# Patient Record
Sex: FEMALE | Race: WHITE | NOT HISPANIC OR LATINO | Employment: OTHER | URBAN - METROPOLITAN AREA
[De-identification: names, ages, dates, MRNs, and addresses within clinical notes are randomized per-mention and may not be internally consistent; named-entity substitution may affect disease eponyms.]

---

## 2017-01-30 ENCOUNTER — COMMUNICATION - HEALTHEAST (OUTPATIENT)
Dept: ONCOLOGY | Facility: CLINIC | Age: 67
End: 2017-01-30

## 2017-02-12 ENCOUNTER — COMMUNICATION - HEALTHEAST (OUTPATIENT)
Dept: ONCOLOGY | Facility: CLINIC | Age: 67
End: 2017-02-12

## 2017-02-16 ENCOUNTER — OFFICE VISIT - HEALTHEAST (OUTPATIENT)
Dept: ONCOLOGY | Facility: CLINIC | Age: 67
End: 2017-02-16

## 2017-02-16 DIAGNOSIS — C50.411 BREAST CANCER OF UPPER-OUTER QUADRANT OF RIGHT FEMALE BREAST (H): ICD-10-CM

## 2017-02-16 DIAGNOSIS — M81.0 OSTEOPOROSIS: ICD-10-CM

## 2017-02-16 DIAGNOSIS — Z79.811 AROMATASE INHIBITOR USE: ICD-10-CM

## 2017-02-16 DIAGNOSIS — Z12.31 ENCOUNTER FOR SCREENING MAMMOGRAM FOR MALIGNANT NEOPLASM OF BREAST: ICD-10-CM

## 2017-08-15 ENCOUNTER — COMMUNICATION - HEALTHEAST (OUTPATIENT)
Dept: ONCOLOGY | Facility: CLINIC | Age: 67
End: 2017-08-15

## 2017-09-18 ENCOUNTER — HOSPITAL ENCOUNTER (OUTPATIENT)
Dept: MAMMOGRAPHY | Facility: HOSPITAL | Age: 67
Discharge: HOME | End: 2017-09-18
Attending: INTERNAL MEDICINE

## 2017-09-18 DIAGNOSIS — Z12.31 ENCOUNTER FOR SCREENING MAMMOGRAM FOR MALIGNANT NEOPLASM OF BREAST: ICD-10-CM

## 2017-09-18 DIAGNOSIS — C50.411 BREAST CANCER OF UPPER-OUTER QUADRANT OF RIGHT FEMALE BREAST (H): ICD-10-CM

## 2017-09-20 ENCOUNTER — OFFICE VISIT - HEALTHEAST (OUTPATIENT)
Dept: ONCOLOGY | Facility: CLINIC | Age: 67
End: 2017-09-20

## 2017-09-20 DIAGNOSIS — C50.411 BREAST CANCER OF UPPER-OUTER QUADRANT OF RIGHT FEMALE BREAST (H): ICD-10-CM

## 2017-09-20 DIAGNOSIS — Z79.811 AROMATASE INHIBITOR USE: ICD-10-CM

## 2017-09-20 DIAGNOSIS — M81.0 OSTEOPOROSIS: ICD-10-CM

## 2017-09-20 ASSESSMENT — MIFFLIN-ST. JEOR: SCORE: 1641.26

## 2017-09-25 ENCOUNTER — COMMUNICATION - HEALTHEAST (OUTPATIENT)
Dept: ONCOLOGY | Facility: CLINIC | Age: 67
End: 2017-09-25

## 2017-11-21 ENCOUNTER — INFUSION - HEALTHEAST (OUTPATIENT)
Dept: INFUSION THERAPY | Facility: CLINIC | Age: 67
End: 2017-11-21

## 2017-11-21 DIAGNOSIS — M81.0 OSTEOPOROSIS: ICD-10-CM

## 2017-11-21 ASSESSMENT — MIFFLIN-ST. JEOR: SCORE: 1685.49

## 2017-12-13 ENCOUNTER — OFFICE VISIT - HEALTHEAST (OUTPATIENT)
Dept: ONCOLOGY | Facility: CLINIC | Age: 67
End: 2017-12-13

## 2017-12-13 DIAGNOSIS — C50.411 MALIGNANT NEOPLASM OF UPPER-OUTER QUADRANT OF RIGHT BREAST IN FEMALE, ESTROGEN RECEPTOR POSITIVE (H): ICD-10-CM

## 2017-12-13 DIAGNOSIS — Z79.811 AROMATASE INHIBITOR USE: ICD-10-CM

## 2017-12-13 DIAGNOSIS — Z17.0 MALIGNANT NEOPLASM OF UPPER-OUTER QUADRANT OF RIGHT BREAST IN FEMALE, ESTROGEN RECEPTOR POSITIVE (H): ICD-10-CM

## 2017-12-13 DIAGNOSIS — M81.0 AGE-RELATED OSTEOPOROSIS WITHOUT CURRENT PATHOLOGICAL FRACTURE: ICD-10-CM

## 2018-03-21 ENCOUNTER — AMBULATORY - HEALTHEAST (OUTPATIENT)
Dept: INFUSION THERAPY | Facility: CLINIC | Age: 68
End: 2018-03-21

## 2018-03-21 ENCOUNTER — OFFICE VISIT - HEALTHEAST (OUTPATIENT)
Dept: ONCOLOGY | Facility: CLINIC | Age: 68
End: 2018-03-21

## 2018-03-21 DIAGNOSIS — L60.0 INGROWN TOENAIL: ICD-10-CM

## 2018-03-21 DIAGNOSIS — C50.411 MALIGNANT NEOPLASM OF UPPER-OUTER QUADRANT OF RIGHT BREAST IN FEMALE, ESTROGEN RECEPTOR POSITIVE (H): ICD-10-CM

## 2018-03-21 DIAGNOSIS — M81.0 AGE-RELATED OSTEOPOROSIS WITHOUT CURRENT PATHOLOGICAL FRACTURE: ICD-10-CM

## 2018-03-21 DIAGNOSIS — Z17.0 MALIGNANT NEOPLASM OF UPPER-OUTER QUADRANT OF RIGHT BREAST IN FEMALE, ESTROGEN RECEPTOR POSITIVE (H): ICD-10-CM

## 2018-03-21 DIAGNOSIS — E66.09 OBESITY DUE TO EXCESS CALORIES WITHOUT SERIOUS COMORBIDITY: ICD-10-CM

## 2018-03-21 LAB
ALBUMIN SERPL-MCNC: 3.2 G/DL (ref 3.5–5)
ALP SERPL-CCNC: 78 U/L (ref 45–120)
ALT SERPL W P-5'-P-CCNC: 21 U/L (ref 0–45)
ANION GAP SERPL CALCULATED.3IONS-SCNC: 10 MMOL/L (ref 5–18)
AST SERPL W P-5'-P-CCNC: 22 U/L (ref 0–40)
BASOPHILS # BLD AUTO: 0 THOU/UL (ref 0–0.2)
BASOPHILS NFR BLD AUTO: 0 % (ref 0–2)
BILIRUB SERPL-MCNC: 0.3 MG/DL (ref 0–1)
BUN SERPL-MCNC: 17 MG/DL (ref 8–22)
CALCIUM SERPL-MCNC: 9 MG/DL (ref 8.5–10.5)
CHLORIDE BLD-SCNC: 111 MMOL/L (ref 98–107)
CO2 SERPL-SCNC: 20 MMOL/L (ref 22–31)
CREAT SERPL-MCNC: 1.03 MG/DL (ref 0.6–1.1)
EOSINOPHIL # BLD AUTO: 0.1 THOU/UL (ref 0–0.4)
EOSINOPHIL NFR BLD AUTO: 2 % (ref 0–6)
ERYTHROCYTE [DISTWIDTH] IN BLOOD BY AUTOMATED COUNT: 15.3 % (ref 11–14.5)
GFR SERPL CREATININE-BSD FRML MDRD: 53 ML/MIN/1.73M2
GLUCOSE BLD-MCNC: 126 MG/DL (ref 70–125)
HCT VFR BLD AUTO: 38.8 % (ref 35–47)
HGB BLD-MCNC: 12.3 G/DL (ref 12–16)
LYMPHOCYTES # BLD AUTO: 1.8 THOU/UL (ref 0.8–4.4)
LYMPHOCYTES NFR BLD AUTO: 27 % (ref 20–40)
MCH RBC QN AUTO: 28 PG (ref 27–34)
MCHC RBC AUTO-ENTMCNC: 31.7 G/DL (ref 32–36)
MCV RBC AUTO: 88 FL (ref 80–100)
MONOCYTES # BLD AUTO: 0.5 THOU/UL (ref 0–0.9)
MONOCYTES NFR BLD AUTO: 7 % (ref 2–10)
NEUTROPHILS # BLD AUTO: 4.2 THOU/UL (ref 2–7.7)
NEUTROPHILS NFR BLD AUTO: 64 % (ref 50–70)
PLATELET # BLD AUTO: 259 THOU/UL (ref 140–440)
PMV BLD AUTO: 9.7 FL (ref 8.5–12.5)
POTASSIUM BLD-SCNC: 4.2 MMOL/L (ref 3.5–5)
PROT SERPL-MCNC: 6.5 G/DL (ref 6–8)
RBC # BLD AUTO: 4.4 MILL/UL (ref 3.8–5.4)
SODIUM SERPL-SCNC: 141 MMOL/L (ref 136–145)
WBC: 6.6 THOU/UL (ref 4–11)

## 2018-04-16 ENCOUNTER — OFFICE VISIT - HEALTHEAST (OUTPATIENT)
Dept: PODIATRY | Facility: CLINIC | Age: 68
End: 2018-04-16

## 2018-04-16 DIAGNOSIS — L60.0 INGROWN TOENAIL: ICD-10-CM

## 2018-04-16 ASSESSMENT — MIFFLIN-ST. JEOR: SCORE: 1692.29

## 2018-04-25 ENCOUNTER — COMMUNICATION - HEALTHEAST (OUTPATIENT)
Dept: SCHEDULING | Facility: CLINIC | Age: 68
End: 2018-04-25

## 2018-04-26 ENCOUNTER — OFFICE VISIT - HEALTHEAST (OUTPATIENT)
Dept: FAMILY MEDICINE | Facility: CLINIC | Age: 68
End: 2018-04-26

## 2018-04-26 DIAGNOSIS — E66.1: ICD-10-CM

## 2018-04-26 DIAGNOSIS — Z13.29 SCREENING FOR THYROID DISORDER: ICD-10-CM

## 2018-04-26 DIAGNOSIS — R63.5 ABNORMAL WEIGHT GAIN: ICD-10-CM

## 2018-04-26 DIAGNOSIS — Z13.228 SCREENING FOR ENDOCRINE, NUTRITIONAL, METABOLIC AND IMMUNITY DISORDER: ICD-10-CM

## 2018-04-26 DIAGNOSIS — B37.2 CANDIDAL INTERTRIGO: ICD-10-CM

## 2018-04-26 DIAGNOSIS — Z13.29 SCREENING FOR ENDOCRINE, NUTRITIONAL, METABOLIC AND IMMUNITY DISORDER: ICD-10-CM

## 2018-04-26 DIAGNOSIS — Z13.1 SCREENING FOR DIABETES MELLITUS: ICD-10-CM

## 2018-04-26 DIAGNOSIS — G47.39 OTHER SLEEP APNEA: ICD-10-CM

## 2018-04-26 DIAGNOSIS — M19.90 OSTEOARTHRITIS: ICD-10-CM

## 2018-04-26 DIAGNOSIS — Z87.442 HISTORY OF KIDNEY STONES: ICD-10-CM

## 2018-04-26 DIAGNOSIS — Z13.220 SCREENING, LIPID: ICD-10-CM

## 2018-04-26 DIAGNOSIS — Z12.11 SCREEN FOR COLON CANCER: ICD-10-CM

## 2018-04-26 DIAGNOSIS — Z13.0 SCREENING FOR ENDOCRINE, NUTRITIONAL, METABOLIC AND IMMUNITY DISORDER: ICD-10-CM

## 2018-04-26 DIAGNOSIS — E66.813: ICD-10-CM

## 2018-04-26 DIAGNOSIS — F50.819 BINGE EATING DISORDER: ICD-10-CM

## 2018-04-26 DIAGNOSIS — Z13.21 SCREENING FOR ENDOCRINE, NUTRITIONAL, METABOLIC AND IMMUNITY DISORDER: ICD-10-CM

## 2018-04-26 DIAGNOSIS — R73.03 PREDIABETES: ICD-10-CM

## 2018-04-26 DIAGNOSIS — Z71.9 HEALTH EDUCATION: ICD-10-CM

## 2018-04-26 LAB
CHOLEST SERPL-MCNC: 214 MG/DL
FASTING STATUS PATIENT QL REPORTED: YES
HBA1C MFR BLD: 5.9 % (ref 3.5–6)
HDLC SERPL-MCNC: 55 MG/DL
LDLC SERPL CALC-MCNC: 132 MG/DL
TRIGL SERPL-MCNC: 137 MG/DL
TSH SERPL DL<=0.005 MIU/L-ACNC: 2.18 UIU/ML (ref 0.3–5)

## 2018-04-26 ASSESSMENT — MIFFLIN-ST. JEOR: SCORE: 1683.22
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
## 2018-04-26 NOTE — ASSESSMENT & PLAN NOTE
Incidentally found on hemoglobin A1c screening today.  Recommend starting metformin 500 mg orally extended release daily.

## 2018-04-27 LAB
25(OH)D3 SERPL-MCNC: 48.8 NG/ML (ref 30–80)
25(OH)D3 SERPL-MCNC: 48.8 NG/ML (ref 30–80)

## 2018-05-14 ENCOUNTER — COMMUNICATION - HEALTHEAST (OUTPATIENT)
Dept: FAMILY MEDICINE | Facility: CLINIC | Age: 68
End: 2018-05-14

## 2018-05-29 ENCOUNTER — OFFICE VISIT - HEALTHEAST (OUTPATIENT)
Dept: FAMILY MEDICINE | Facility: CLINIC | Age: 68
End: 2018-05-29

## 2018-05-29 DIAGNOSIS — E66.1: ICD-10-CM

## 2018-05-29 DIAGNOSIS — E66.813: ICD-10-CM

## 2018-05-29 ASSESSMENT — MIFFLIN-ST. JEOR: SCORE: 1642.4

## 2018-05-29 NOTE — ASSESSMENT & PLAN NOTE
Dx:BMI 47.7 likely caused by chemotherapeutic agents used for breast cancer treatment. with multiple weight related comorbid conditions including breast cancer, prediabetes, osteoarthritis, sleep apnea, and binge eating disorder.      It is believed that her breast cancer treatment drugs have caused her weight disorder to escalate.  She was referred to me by Dr. Laila Young of oncology hematology for this reason.    She mentioned on 5/29/2018 that the low back pain that she had been experiencing recently resolved with a 9 pound weight loss.  She is no longer having back pain and is able to work in her garden for several hours a day.    Initial Weight: 265 lbs - bmi 47.7 4/26/2018  Weight: 256 lb (116.1 kg) bmi 46.08, 5/29/2018   Weight loss from initial: 9  % Weight loss: 3.4 %    Are you experiencing any side effects to the medications:  No  METFORMIN 500 MG PO Q DAY - prediabetes.  4/26/2018 - 5/29/2018 - LOST 9 LBS THE FIRST MONTH   control:  good  Exercise was discussed: working in her gardens.   Taking supplements:   Fish oil (not taking consistently), vitamin D, multivitamin, 5 hydroxytryptophan, chromium  Discussed journaling food:  Yes. She had two episodes of bingeing in the past 5 weeks.   Patient is pleased with the current results:  yes  The patient is following the nutrition plan:  Did the diet for the first 2-3 weeks but she then fell off a little.   Barriers to losing weight:  Metabolism:   IR      PLAN  Follow up in 1 month.  Continue medications. metformin  Continue supplements.  This month concentrate on   Her goals - eat smaller portions and choose better foods.

## 2018-06-20 ENCOUNTER — OFFICE VISIT - HEALTHEAST (OUTPATIENT)
Dept: ONCOLOGY | Facility: CLINIC | Age: 68
End: 2018-06-20

## 2018-06-20 DIAGNOSIS — C50.411 MALIGNANT NEOPLASM OF UPPER-OUTER QUADRANT OF RIGHT BREAST IN FEMALE, ESTROGEN RECEPTOR POSITIVE (H): ICD-10-CM

## 2018-06-20 DIAGNOSIS — Z17.0 MALIGNANT NEOPLASM OF UPPER-OUTER QUADRANT OF RIGHT BREAST IN FEMALE, ESTROGEN RECEPTOR POSITIVE (H): ICD-10-CM

## 2018-06-20 DIAGNOSIS — Z12.31 ENCOUNTER FOR SCREENING MAMMOGRAM FOR MALIGNANT NEOPLASM OF BREAST: ICD-10-CM

## 2018-06-20 DIAGNOSIS — Z79.810 USE OF TAMOXIFEN (NOLVADEX): ICD-10-CM

## 2018-06-20 DIAGNOSIS — M81.0 AGE-RELATED OSTEOPOROSIS WITHOUT CURRENT PATHOLOGICAL FRACTURE: ICD-10-CM

## 2018-06-20 ASSESSMENT — MIFFLIN-ST. JEOR: SCORE: 1630.6

## 2018-06-26 ENCOUNTER — OFFICE VISIT - HEALTHEAST (OUTPATIENT)
Dept: FAMILY MEDICINE | Facility: CLINIC | Age: 68
End: 2018-06-26

## 2018-06-26 DIAGNOSIS — E66.813: ICD-10-CM

## 2018-06-26 DIAGNOSIS — E66.1: ICD-10-CM

## 2018-06-26 DIAGNOSIS — E78.2 MIXED HYPERLIPIDEMIA: ICD-10-CM

## 2018-06-26 ASSESSMENT — MIFFLIN-ST. JEOR: SCORE: 1633.33

## 2018-06-26 NOTE — ASSESSMENT & PLAN NOTE
lipids - reviewed today  ldl is a little high. Recommend discuss w pcp     Lab Results   Component Value Date    CHOL 214 (H) 04/26/2018     Lab Results   Component Value Date    HDL 55 04/26/2018     Lab Results   Component Value Date    LDLCALC 132 (H) 04/26/2018     Lab Results   Component Value Date    TRIG 137 04/26/2018     No components found for: CHOLHDL

## 2018-06-26 NOTE — ASSESSMENT & PLAN NOTE
Dx:BMI 47.7 likely caused by chemotherapeutic agents used for breast cancer treatment. with multiple weight related comorbid conditions including breast cancer, prediabetes, osteoarthritis, sleep apnea, and binge eating disorder.        Initial Weight: 265 lbs - bmi 47.7 4/26/2018  Weight: 256 lb (116.1 kg) bmi 46.08, 5/29/2018   Weight: 254 lb (115.2 kg)Body mass index is 45.72 kg/(m^2).  6/26/2018   Weight loss from initial: 11  % Weight loss: 4.15 %       Are you experiencing any side effects to the medications:  No  METFORMIN 500 MG PO Q DAY - prediabetes.  4/26/2018 - 6/26/2018  - LOST 11 LBS THE FIRST two months     Hunger control:  good  Exercise was discussed: working in her gardens. She does straw bail gardening.   Taking supplements:   Fish oil (nottaking consistently), vitamin D, multivitamin, 5 hydroxytryptophan, chromium  Discussed journaling food:  Yes. She had two episodes of bingeing in the past 5 weeks.   Patient is pleased with the current results:  yes  The patient is following the nutrition plan:  choosing foods that are not recommended quite frequently.    Barriers to losing weight:  Metabolism:   IR        PLAN  Follow up in 1 month.  Continue medications. metformin  Continue supplements.  Thismonth concentrate on   Her goals - meet with dietician, to help her change food choices.   She mentions if she is sewing or knitting she can be all day without eating because she loves that.

## 2018-07-31 ENCOUNTER — OFFICE VISIT - HEALTHEAST (OUTPATIENT)
Dept: FAMILY MEDICINE | Facility: CLINIC | Age: 68
End: 2018-07-31

## 2018-07-31 DIAGNOSIS — E66.813: ICD-10-CM

## 2018-07-31 DIAGNOSIS — R73.03 PREDIABETES: ICD-10-CM

## 2018-07-31 DIAGNOSIS — Z51.81 ENCOUNTER FOR THERAPEUTIC DRUG MONITORING: ICD-10-CM

## 2018-07-31 DIAGNOSIS — E66.1: ICD-10-CM

## 2018-07-31 ASSESSMENT — MIFFLIN-ST. JEOR: SCORE: 1606.11

## 2018-07-31 NOTE — ASSESSMENT & PLAN NOTE
Dx:BMI 47.7 likely caused by chemotherapeutic agents used for breast cancer treatment. with multiple weight related comorbid conditions including breast cancer, prediabetes, osteoarthritis, sleep apnea, and binge eating disorder.        Initial Weight: 265 lbs - bmi 47.7 4/26/2018  Weight: 256 lb (116.1 kg) bmi 46.08, 5/29/2018   Weight: 254 lb (115.2 kg) Body mass index is 45.72 kg/(m^2).  6/26/2018   Weight: 248 lb (112.5 kg) bmi 44.64, 7/31/2018   Weight loss from initial: 17 down an additional 6 lbs from last month.  % Weight loss: 6.42 %         Are you experiencing any side effects to the medications:  No  METFORMIN 500 MG PO Q DAY - prediabetes.  4/26/2018 - 7/31/2018  - LOST 17 LBS THE FIRST three months      Hunger control:  good  Exercise was discussed: working in her gardens. She does straw bail gardening.   Taking supplements:   Fish oil (not taking consistently), vitamin D, multivitamin, 5 hydroxytryptophan, chromium  Discussed journaling food:  Yes.  Patient is pleased with the current results:  yes  The patient is following the nutrition plan: she is keeping a bag of cut veggies in fridge and this is helping her choose healthy snacks.  Barriers to losing weight:  Metabolism:   IR          PLAN  Follow up in 1 month.  Continue medications. metformin  Continue supplements.  This month concentrate on   Her goals - meet with dietician, to help her change food choices.   She mentions ifshe is sewing or knitting she can be all day without eating because she loves that.     Dx:BMI 47.7 likely caused by chemotherapeutic agents used for breast cancer treatment. with multiple weight related comorbid conditions including breast cancer, prediabetes, osteoarthritis, sleep apnea, and binge eating disorder.        Initial Weight: 265 lbs - bmi 47.7 4/26/2018  Weight: 256 lb (116.1 kg) bmi 46.08, 5/29/2018   Weight: 254 lb (115.2 kg) Body mass index is 45.72 kg/(m^2).  6/26/2018   Weight: 248 lb (112.5 kg) bmi 44.64,  7/31/2018   Weight loss from initial: 17 (down additional 6 lb since 6/2/2018)  % Weight loss: 6.42 %      Are you experiencing any side effects to the medications:  No  METFORMIN 500 MG PO Q DAY - prediabetes.  4/26/2018 - 7/31/2018   - LOST 17 LBS THE FIRST three months      Hunger control:  good  Exercise was discussed: working in her gardens. She does straw bail gardening.   Taking supplements:   Fish oil (not taking consistently), vitamin D, multivitamin, 5 hydroxytryptophan, chromium  Discussed journaling food:  Yes. She had two episodes of bingeing in the past 5 weeks.   Patient is pleased with the current results:  yes  The patient is following the nutrition plan:  choosing foods that are not recommended quite frequently.    Barriers to losing weight:  Metabolism:   IR.  Also gets up 11pm -2 am to exercise because does not want other people to see her at the gym. This will lilkey interfere w healthy sleep pattern         PLAN  Follow up in 1 month.  Continue medications. metformin  Continue supplements.  This month concentrate on  Her goals - meet with dietician, to help her change food choices.  But she has been really busy and so she has not set this up yet.    She mentions if she is sewing or knitting she can be all day without eating because she loves that.  declined to schedule when gi called re colonoscopy -and today tells me that she has been dealing with some kidney stones so right now is not a good time.  Discussed healthy sleep patterns and advised against exercising during normal sleeping hours.

## 2018-09-04 ENCOUNTER — OFFICE VISIT - HEALTHEAST (OUTPATIENT)
Dept: FAMILY MEDICINE | Facility: CLINIC | Age: 68
End: 2018-09-04

## 2018-09-04 DIAGNOSIS — E66.813 CLASS 3 DRUG-INDUCED OBESITY WITH SERIOUS COMORBIDITY AND BODY MASS INDEX (BMI) OF 40.0 TO 44.9 IN ADULT (H): ICD-10-CM

## 2018-09-04 DIAGNOSIS — E66.1 CLASS 3 DRUG-INDUCED OBESITY WITH SERIOUS COMORBIDITY AND BODY MASS INDEX (BMI) OF 40.0 TO 44.9 IN ADULT (H): ICD-10-CM

## 2018-09-04 DIAGNOSIS — R73.03 PREDIABETES: ICD-10-CM

## 2018-09-04 LAB
CREAT SERPL-MCNC: 1.02 MG/DL (ref 0.6–1.1)
GFR SERPL CREATININE-BSD FRML MDRD: 54 ML/MIN/1.73M2
HBA1C MFR BLD: 6.3 % (ref 3.5–6)

## 2018-09-04 ASSESSMENT — MIFFLIN-ST. JEOR: SCORE: 1606.11

## 2018-09-04 NOTE — ASSESSMENT & PLAN NOTE
Dx:BMI 47.7 likely caused by chemotherapeutic agents used for breast cancer treatment. with multiple weight related comorbid conditions including breast cancer, prediabetes, osteoarthritis, sleep apnea, and binge eating disorder.        Initial Weight: 265 lbs - bmi 47.7 4/26/2018  Weight: 256 lb (116.1 kg) bmi 46.08, 5/29/2018   Weight: 254 lb (115.2 kg) Body mass index is 45.72 kg/(m^2).  6/26/2018   Weight: 248 lb (112.5 kg) bmi 44.64, 7/31/2018   Weight: 248 lb (112.5 kg) bmi 44.64, 9/4/2018   Weight loss from initial: 17  % Weight loss: 6.42 %        Are you experiencing any side effects to the medications:  No    Medication notes:   4/30/18 - intake.  4/26/18 IR evidenced by a1c 5.9, fast blood sugar 126 and waist circumfrence 50 in.    METFORMIN 500 MG PO Q DAY - prediabetes.  4/26/2018 - 9/4/2018   - LOST 17 LBS     Vyvanse?   Could consider since intake indicates she binge eats.    NAC - consider if needed for compulsive eating    CONTRAVE   I also thought about Contrave for her.  She was pretty emotional during her intake visit and tearful at times.  If it seems she has some depression ongoing during future visits she may benefit from Wellbutrin or Contrave.        Hunger control:  good  Exercise was discussed: working in her gardens. She does straw bail gardening.   Taking supplements:   Fish oil (not taking consistently), vitamin D, multivitamin, 5 hydroxytryptophan, chromium  Discussed journaling food:  Yes. She had two episodes of bingeing in the past 5 weeks.   Patient is pleased with the current results:  yes  The patient is following the nutrition plan:  choosing foods that are not recommended quite frequently.    Barriers to losing weight:  Metabolism:   IR.  Also gets up 11pm -2 am to exercise because does not want other people to see her at the gym. This will lilkey interfere w healthy sleep pattern         PLAN  MAINTENANCE PHASE.  Discussed CAP plan today.   Less than or = to 250 -  control  251-253 - action   Greater than or equal to 254 - panic - needs immediate follow up    Continue medications. metformin  Continue supplements.Fish oil (not taking consistently), vitamin D, multivitamin, 5 hydroxytryptophan, chromium    Her goals - meet with dietician, to help her change food choices.  But she has been really busy and so she has not set this up yet.      Future discussion topics:   Intermittentfasting.   She mentions if she is sewing or knitting she can be all day without eating because she loves that.  healthy sleep patterns and advised against exercising during normal sleeping hours.  Could consider NAC  In the future.

## 2018-09-20 ENCOUNTER — HOSPITAL ENCOUNTER (OUTPATIENT)
Dept: MAMMOGRAPHY | Facility: CLINIC | Age: 68
Discharge: HOME | End: 2018-09-20
Attending: INTERNAL MEDICINE

## 2018-09-20 DIAGNOSIS — Z17.0 MALIGNANT NEOPLASM OF UPPER-OUTER QUADRANT OF RIGHT BREAST IN FEMALE, ESTROGEN RECEPTOR POSITIVE (H): ICD-10-CM

## 2018-09-20 DIAGNOSIS — C50.411 MALIGNANT NEOPLASM OF UPPER-OUTER QUADRANT OF RIGHT BREAST IN FEMALE, ESTROGEN RECEPTOR POSITIVE (H): ICD-10-CM

## 2018-09-20 DIAGNOSIS — Z12.31 ENCOUNTER FOR SCREENING MAMMOGRAM FOR MALIGNANT NEOPLASM OF BREAST: ICD-10-CM

## 2018-09-24 ENCOUNTER — RECORDS - HEALTHEAST (OUTPATIENT)
Dept: ADMINISTRATIVE | Facility: OTHER | Age: 68
End: 2018-09-24

## 2018-10-03 ENCOUNTER — OFFICE VISIT - HEALTHEAST (OUTPATIENT)
Dept: ONCOLOGY | Facility: CLINIC | Age: 68
End: 2018-10-03

## 2018-10-03 DIAGNOSIS — Z17.0 MALIGNANT NEOPLASM OF UPPER-OUTER QUADRANT OF RIGHT BREAST IN FEMALE, ESTROGEN RECEPTOR POSITIVE (H): ICD-10-CM

## 2018-10-03 DIAGNOSIS — C50.411 MALIGNANT NEOPLASM OF UPPER-OUTER QUADRANT OF RIGHT BREAST IN FEMALE, ESTROGEN RECEPTOR POSITIVE (H): ICD-10-CM

## 2018-10-03 DIAGNOSIS — M85.859 OSTEOPENIA OF NECK OF FEMUR, UNSPECIFIED LATERALITY: ICD-10-CM

## 2018-10-03 DIAGNOSIS — Z79.810 USE OF TAMOXIFEN (NOLVADEX): ICD-10-CM

## 2018-10-03 ASSESSMENT — MIFFLIN-ST. JEOR: SCORE: 1611.1

## 2018-11-01 ENCOUNTER — OFFICE VISIT - HEALTHEAST (OUTPATIENT)
Dept: FAMILY MEDICINE | Facility: CLINIC | Age: 68
End: 2018-11-01

## 2018-11-01 DIAGNOSIS — R73.03 PREDIABETES: ICD-10-CM

## 2018-11-01 DIAGNOSIS — E66.813 CLASS 3 DRUG-INDUCED OBESITY WITH SERIOUS COMORBIDITY AND BODY MASS INDEX (BMI) OF 40.0 TO 44.9 IN ADULT (H): ICD-10-CM

## 2018-11-01 DIAGNOSIS — E66.1 CLASS 3 DRUG-INDUCED OBESITY WITH SERIOUS COMORBIDITY AND BODY MASS INDEX (BMI) OF 40.0 TO 44.9 IN ADULT (H): ICD-10-CM

## 2018-11-01 DIAGNOSIS — M81.0 OSTEOPOROSIS: ICD-10-CM

## 2018-11-01 ASSESSMENT — MIFFLIN-ST. JEOR: SCORE: 1597.04

## 2018-11-01 NOTE — ASSESSMENT & PLAN NOTE
Hemoglobin A1c is worsening despite significant loss.  Metformin was increased today by milligram extended release to 1000 mg at least once daily.  BMI in the problem list.

## 2018-11-01 NOTE — ASSESSMENT & PLAN NOTE
Dx:BMI 47.7 likely caused by chemotherapeutic agents used for breast cancer treatment. with multiple weight related comorbid conditions including breast cancer, prediabetes (worsening despite weight loss as of 11/1/2018), osteoarthritis, sleep apnea, and binge eating disorder.        Initial Weight: 265 lbs - bmi 47.7 4/26/2018  Weight: 256 lb (116.1 kg) bmi 46.08, 5/29/2018   Weight: 254 lb (115.2 kg) Body mass index is 45.72 kg/(m^2).  6/26/2018   Weight: 248 lb (112.5 kg) bmi 44.64, 7/31/2018   Weight: 248 lb (112.5 kg) bmi 44.64, 9/4/2018   Weight: 246 lb (111.6 kg) bmi 44.28, 11/1/2018   Weight loss from initial: 19  % Weight loss: 7.17 %       Are you experiencing any side effects to the medications:  No     Medication notes:   4/30/18 - intake.  4/26/18 IR evidenced by a1c 5.9, fast blood sugar 126 and waist circumfrence 50 in.     METFORMIN 500 MG PO Q DAY - prediabetes.  4/26/2018 - 11/1/2018    - LOST 17 LBS  -  a1c worsening despite weight loss. 4/2018 a1c was 5.9 and weight was 265 lbs, 9/2018 a1c was 6.3 and weight was 248 lbs.   11/1/2018 - metformin increased   METFORMIN XR 1000 MG PO Q DAY - prediabetes (worsening on metformin 500 xr)  11/1/2018 - start. Risks benefits discussed and need to monitor gfr in the setting of gfr in 50s was discussed and informed consent was given.  Alternative of using saxenda also discussed.      SAXENDA - recommended for weight loss and increasing a1c despite weight loss  11/1/2018 - discussed. (would need to discontinue metformin).    Vyvanse?   Could consider since intake indicates she binge eats.     NAC - consider if needed for compulsive eating     CONTRAVE   I also thought about Contrave for her.  She was pretty emotional during her intake visit and tearful at times.  If it seems she has some depression ongoing during future visits she may benefit from Wellbutrin or Contrave.      Hunger control:  good  Exercise was discussed: working on remodeling her house.    Taking supplements:   Fish oil (not taking consistently), vitamin D, multivitamin, 5 hydroxytryptophan, chromium  Discussed journaling food:  Yes. She had two episodes of bingeing in the past 5 weeks.   Patient is pleased with the current results:  yes  The patient is following the nutrition plan:  choosing foods that are not recommended quite frequently.    Barriers to losing weight:  Metabolism:   IR.  erratic sleep pattern       PLAN  ACTIVE WEIGHT LOSS PHASE, HAVING DIFFICULTY/ STARTING TO PLATEAU    Discussed CAP plan today.   Less than or = to 248 - control  249-251 - action   Greater than or equal to 252 - panic - needs immediate follow up     Increase metformin off label for prediabetes (a1c worsening) plan recheck a1c and creatinine in 3 months, but if not better consider dc metformin and start saxenda).     Continue supplements.Fish oil (not taking consistently), vitamin D, multivitamin, 5 hydroxytryptophan, chromium    Goal: Wants to increase physical activity. Wants to be on her treadmill 30 min 5 days per week.     Think about getting to the gym.     dexa was discussed today. Reviewed through [facility] - we discussed that unless doing weight bearing exercise weight loss tends to have a definite negative impact on bone density. She is aware of the importance of this since her mother and aunt also suffered from osteoporosis.    FUTURE ORDERS: DEXA 9/2019. a1c and bmp 12/2018.  Repeat lipid 4/2019.     Return in about 1 month (around 12/1/2018) for weight loss.

## 2018-11-01 NOTE — ASSESSMENT & PLAN NOTE
Chart review today- see BMI in the problem list.  We did discuss the impact of weight loss on bone density and the importance of weightbearing exercise to avoid reduction in bone density.  A bone density scan should be done again in September 2019.

## 2018-12-05 ENCOUNTER — OFFICE VISIT - HEALTHEAST (OUTPATIENT)
Dept: FAMILY MEDICINE | Facility: CLINIC | Age: 68
End: 2018-12-05

## 2018-12-05 DIAGNOSIS — E66.813 CLASS 3 DRUG-INDUCED OBESITY WITH SERIOUS COMORBIDITY AND BODY MASS INDEX (BMI) OF 40.0 TO 44.9 IN ADULT (H): ICD-10-CM

## 2018-12-05 DIAGNOSIS — E66.1 CLASS 3 DRUG-INDUCED OBESITY WITH SERIOUS COMORBIDITY AND BODY MASS INDEX (BMI) OF 40.0 TO 44.9 IN ADULT (H): ICD-10-CM

## 2018-12-05 ASSESSMENT — MIFFLIN-ST. JEOR: SCORE: 1578.89

## 2018-12-05 NOTE — ASSESSMENT & PLAN NOTE
Dx:BMI 47.7 likely caused by chemotherapeutic agents used for breast cancer treatment. with multiple weight related comorbid conditions including breast cancer, prediabetes (worsening despite weight loss as of 11/1/2018), osteoarthritis, sleep apnea, and binge eating disorder.        Initial Weight: 265 lbs - bmi 47.7 4/26/2018  Weight: 256 lb (116.1 kg) bmi 46.08, 5/29/2018   Weight: 254 lb (115.2 kg) Body mass index is 45.72 kg/(m^2).  6/26/2018   Weight: 248 lb (112.5 kg) bmi 44.64, 7/31/2018   Weight: 248 lb (112.5 kg) bmi 44.64, 9/4/2018   Weight: 246 lb (111.6 kg) bmi44.28, 11/1/2018   Weight: (!) 242 lb (109.8 kg) bmi 43.56, 12/5/2018   Weight loss from initial: 23  % Weight loss: 8.68 %          Medication notes:   4/30/18 - intake.  4/26/18 IR evidenced by a1c 5.9, fast blood sugar 126 and waist circumfrence 50 in.     METFORMIN 500 MG PO Q DAY - prediabetes.  4/26/2018 - 11/1/2018    - LOST 17 LBS  -  a1c worsening despite weight loss. 4/2018 a1c was 5.9 and weight was 265 lbs, 9/2018 a1c was 6.3 and weight was 248 lbs.   11/1/2018 - metformin increased   METFORMIN XR 1000 MG PO Q DAY - prediabetes (worsening on metformin 500 xr)  11/1/2018 -12/5/2018  Risks benefits discussed and need to monitor gfr in the setting of gfr in 50s was discussed and informed consent was given.  Alternative of using saxenda also discussed.      SAXENDA - recommended for weight loss and increasing a1c despite weight loss  11/1/2018 - discussed. (would need to discontinue metformin).     Vyvanse?   Could consider since intake indicates she binge eats.     NAC - consider if needed for compulsive eating     CONTRAVE   Benito thought about Contrave for her.  She was pretty emotional during her intake visit and tearful at times.  If it seems she has some depression ongoing during future visits she may benefit fromWellbutrin or Contrave.         PLAN  ACTIVE WEIGHT LOSS PHASE     CAP plan reset today with lower weight.   Less than or  = to 244 - control  244-247 - action   Greater than or equal to 248 - panic - needs immediate follow up     Continue Metformin at increased dose,  off label for prediabetes (a1c worsening), plan recheck a1c and creatinine in 3 months, but if not better consider dc metformin and start saxenda).      Continue supplements.Fish oil (not taking consistently), vitamin D, multivitamin, 5 hydroxytryptophan, chromium     Goal: continue to be on her treadmill 30 min 5 days per week     FUTURE ORDERS: DEXA 9/2019. a1c and bmp 2/2019 (when she has been on the metformin 1000 mg dose x 3 months). Repeat lipid 4/2019.      Return in about 1 month for weight loss.

## 2018-12-14 ENCOUNTER — COMMUNICATION - HEALTHEAST (OUTPATIENT)
Dept: ONCOLOGY | Facility: CLINIC | Age: 68
End: 2018-12-14

## 2019-01-23 ENCOUNTER — OFFICE VISIT - HEALTHEAST (OUTPATIENT)
Dept: FAMILY MEDICINE | Facility: CLINIC | Age: 69
End: 2019-01-23

## 2019-01-23 DIAGNOSIS — R73.03 PREDIABETES: ICD-10-CM

## 2019-01-23 DIAGNOSIS — E66.1 CLASS 3 DRUG-INDUCED OBESITY WITH SERIOUS COMORBIDITY AND BODY MASS INDEX (BMI) OF 40.0 TO 44.9 IN ADULT (H): ICD-10-CM

## 2019-01-23 DIAGNOSIS — Z51.81 ENCOUNTER FOR THERAPEUTIC DRUG MONITORING: ICD-10-CM

## 2019-01-23 DIAGNOSIS — E78.2 MIXED HYPERLIPIDEMIA: ICD-10-CM

## 2019-01-23 DIAGNOSIS — E66.813 CLASS 3 DRUG-INDUCED OBESITY WITH SERIOUS COMORBIDITY AND BODY MASS INDEX (BMI) OF 40.0 TO 44.9 IN ADULT (H): ICD-10-CM

## 2019-01-23 LAB
CHOLEST SERPL-MCNC: 175 MG/DL
CREAT SERPL-MCNC: 0.91 MG/DL (ref 0.6–1.1)
FASTING STATUS PATIENT QL REPORTED: YES
GFR SERPL CREATININE-BSD FRML MDRD: >60 ML/MIN/1.73M2
HBA1C MFR BLD: 6.2 % (ref 3.5–6)
HDLC SERPL-MCNC: 54 MG/DL
LDLC SERPL CALC-MCNC: 94 MG/DL
TRIGL SERPL-MCNC: 133 MG/DL
VIT B12 SERPL-MCNC: 545 PG/ML (ref 213–816)

## 2019-01-23 ASSESSMENT — MIFFLIN-ST. JEOR: SCORE: 1547.14

## 2019-01-23 NOTE — ASSESSMENT & PLAN NOTE
Dx:BMI 47.7 likely caused by chemotherapeutic agents used for breast cancer treatment. with multiple weight related comorbid conditions including breast cancer, prediabetes (worsening despite weight loss as of 11/1/2018), osteoarthritis, sleep apnea, and binge eating disorder.        Initial Weight: 265 lbs - bmi 47.7 4/26/2018  Weight:256 lb (116.1 kg) bmi 46.08, 5/29/2018   Weight: 254 lb (115.2 kg) Body mass index is 45.72 kg/(m^2).  6/26/2018   Weight: 248 lb (112.5 kg) bmi 44.64, 7/31/2018   Weight: 248 lb (112.5 kg) bmi 44.64, 9/4/2018   Weight: 246 lb (111.6 kg) bmi 44.28, 11/1/2018   Weight: (!) 242 lb (109.8 kg) bmi 43.56, 12/5/2018   Weight: (!) 235 lb (106.6 kg)  Weight loss from initial: 30  % Weight loss: 11.32 %       Medication notes:   4/30/18 - intake.  4/26/18 IR evidenced by a1c 5.9, fast blood sugar 126 and waist circumfrence 50 in.     METFORMIN 500 MG PO Q DAY - prediabetes.  4/26/2018 - 11/1/2018    - LOST 17 LBS  -  a1c worsening despite weight loss. 4/2018 a1c was 5.9 and weight was 265 lbs, 9/2018 a1c was 6.3 and weight was 248 lbs.   11/1/2018 - metformin increased   METFORMIN XR 1000 MG PO Q DAY - prediabetes (worsening on metformin 500 xr)  11/1/2018 - 1/23/2019  Risks benefits discussed and need to monitor gfr in the setting of gfr in 50s was discussed and informed consent was given.  Alternative of using saxenda also discussed.      SAXENDA - recommended for weight loss and increasing a1c despite weight loss  11/1/2018 - discussed. (would need to discontinue metformin).     Vyvanse?   Could consider since intake indicates she binge eats.     NAC - consider if needed for compulsive eating     CONTRAVE   I also thought about Contrave for her.  She was pretty emotional during her intake visit and tearful at times.  If it seems she has some depression ongoing during future visits she may benefit from Wellbutrin or Contrave.         PLAN  ACTIVE WEIGHT LOSS PHASE  (BMI down from  47-43)     CAP plan reset today with lower weight.  Less than or = to 237 - control  238-240 - action   Greater than or equal to 241 - panic - needs immediate follow up     Continue Metformin      Continue supplements.Fish oil (not taking consistently), vitamin D, multivitamin, 5 hydroxytryptophan, chromium     Goal: Wants to get on her treadmill 2 times a week.      Labs;   Repeat lipids weight is down 30 lbs since these were last done.   Repeat a1c weight is down 13 lbs since this was last done.   Creatinine and b12 to monitor long term effects of metformin.     FUTURE ORDERS: DEXA 9/2019.      Return in about 1 month for weight loss.

## 2019-01-24 ENCOUNTER — COMMUNICATION - HEALTHEAST (OUTPATIENT)
Dept: FAMILY MEDICINE | Facility: CLINIC | Age: 69
End: 2019-01-24

## 2019-01-24 DIAGNOSIS — R73.03 PREDIABETES: ICD-10-CM

## 2019-02-28 ENCOUNTER — OFFICE VISIT - HEALTHEAST (OUTPATIENT)
Dept: FAMILY MEDICINE | Facility: CLINIC | Age: 69
End: 2019-02-28

## 2019-02-28 DIAGNOSIS — E66.813 CLASS 3 DRUG-INDUCED OBESITY WITH SERIOUS COMORBIDITY AND BODY MASS INDEX (BMI) OF 40.0 TO 44.9 IN ADULT (H): ICD-10-CM

## 2019-02-28 DIAGNOSIS — R73.03 PREDIABETES: ICD-10-CM

## 2019-02-28 DIAGNOSIS — E66.1 CLASS 3 DRUG-INDUCED OBESITY WITH SERIOUS COMORBIDITY AND BODY MASS INDEX (BMI) OF 40.0 TO 44.9 IN ADULT (H): ICD-10-CM

## 2019-02-28 ASSESSMENT — MIFFLIN-ST. JEOR: SCORE: 1553.04

## 2019-02-28 NOTE — ASSESSMENT & PLAN NOTE
Dx:BMI 47.7 likely caused by chemotherapeutic agents used for breast cancer treatment. with multiple weight related comorbid conditions including breast cancer, prediabetes - starting to improve, hyperlipidemia (resolved with weight reduction), osteoarthritis, sleep apnea, and binge eating disorder.        Initial Weight: 265 lbs - bmi 47.7 4/26/2018  Weight: 256 lb (116.1 kg) bmi 46.08, 5/29/2018   Weight: 254 lb (115.2 kg) Body mass index is 45.72 kg/(m^2).  6/26/2018   Weight: 248 lb (112.5 kg) bmi 44.64, 7/31/2018   Weight: 248 lb (112.5 kg) bmi 44.64, 9/4/2018   Weight: 246 lb (111.6 kg) bmi 44.28, 11/1/2018   Weight: (!) 242 lb (109.8 kg) bmi 43.56, 12/5/2018   Weight: (!) 235 lb (106.6 kg), 42.3, 1/23/19  Weight: (!) 236 lb 4.8 oz (107.2 kg) Body mass index is 42.53 kg/m . , 2/28/2019   Weight loss from initial: 28.7  % Weight loss: 10.83 %       Medication notes:   4/30/18 - intake.  4/26/18 IR evidenced by a1c 5.9, fast blood sugar 126 and waist circumfrence 50 in.     METFORMIN 500 MG PO Q DAY - prediabetes.  4/26/2018 - 11/1/2018    - LOST 17 LBS  -  a1c worsening despite weight loss. 4/2018 a1c was 5.9 and weight was 265 lbs, 9/2018 a1c was 6.3 and weight was 248 lbs.   11/1/2018 - metformin increased   METFORMIN XR 1000 MG PO Q DAY - prediabetes (worsening on metformin 500 xr)  11/1/2018 - 2/28/2019   Risks benefits discussed and need to monitor gfr in the setting of gfr in 50s was discussed and informed consent was given.  Alternative of using saxenda also discussed.      SAXENDA - recommended for weight loss and increasing a1c despite weight loss  11/1/2018 - discussed. (would need to discontinue metformin).     Vyvanse?   Could consider since intake indicates she binge eats.     NAC - consider if needed for compulsive eating     CONTRAVE   I also thought about Contrave for her.  She was pretty emotional during her intake visit and tearful at times.  If it seems she has some depression ongoing during  future visits she may benefit from Wellbutrin or Contrave.      pmh that could impact weight loss drug choices  Age: 68 y.o.   Contraception: postmenopausal  Glaucoma history - no  Do you have a history of osteopenia or osteoporosis? - yes.   Seizure history in self or family - yes  Hx of kidneystones - yes  History of pancreatitis - yes  History of bulemia -  no-   Bingeing - 0 times per week  compulsive or emotional eating  - yes  History of thyroid medullary cancer/ other endocrine cancer orfam hx of the same - breast cancer hormone sensitive.   How many oz of caffeinated beverages do you consume per day and what type? Maybe a cup of tea or cup of coffee once or twice a week.   How much alcohol do you drink per day? NONE  Do you have depression - YES  Do you have anxiety  - NO  Are you taking any antidepressants - NO  Do you get migraines - NONE  Do you smoke - FORMER SMOKER  Do you have night eating tendencies - YES  Have you ever had diabetes/ or prediabetes or metabolic syndrome -  YES  Do you have high blood pressure - NO  Do you drink a lot of pop  -NO  Do you have insomnia - NO  Do you have any chronic pain? YES  Do you have high cholesterol - IN THE PAST         PLAN  ACTIVE WEIGHT LOSS PHASE  (BMI down from 47-42) hyperlipidemia resolved with weight reduction and a1c improving with weight reduction.      CAP plan   Less than or = to 238 - control  239-241 - action   Greater than or equal to242 - panic - needs immediate follow up     Continue Metformin   Start wellbutrin 150 xr daily.     Continue supplements.Fish oil (not taking consistently), vitamin D, multivitamin, 5 hydroxytryptophan, chromium     Goal: start wellbutrin.      Labs;  1/2019 normalized lipids noted (w 30 lb loss), normal b12, normal creatinine, impoved a1c from 6.3-6.2 (with 13 lb loss)  Plan repeat labs 5/2019.      FUTURE ORDERS: DEXA 9/2019.      in about 1 month for weight loss.

## 2019-03-14 ENCOUNTER — COMMUNICATION - HEALTHEAST (OUTPATIENT)
Dept: ONCOLOGY | Facility: CLINIC | Age: 69
End: 2019-03-14

## 2019-04-16 ENCOUNTER — OFFICE VISIT - HEALTHEAST (OUTPATIENT)
Dept: FAMILY MEDICINE | Facility: CLINIC | Age: 69
End: 2019-04-16

## 2019-04-16 DIAGNOSIS — E66.813 CLASS 3 DRUG-INDUCED OBESITY WITH SERIOUS COMORBIDITY AND BODY MASS INDEX (BMI) OF 40.0 TO 44.9 IN ADULT (H): ICD-10-CM

## 2019-04-16 DIAGNOSIS — E66.1 CLASS 3 DRUG-INDUCED OBESITY WITH SERIOUS COMORBIDITY AND BODY MASS INDEX (BMI) OF 40.0 TO 44.9 IN ADULT (H): ICD-10-CM

## 2019-04-16 NOTE — ASSESSMENT & PLAN NOTE
Dx:BMI 47.7 likely caused by chemotherapeutic agents used for breast cancer treatment. with multiple weight related comorbid conditions including breast cancer, prediabetes - starting to improve, hyperlipidemia (resolved with weight reduction), osteoarthritis, sleep apnea, and binge eating disorder.        Initial Weight: 265 lbs - bmi 47.7 4/26/2018  Weight: 256 lb (116.1 kg) bmi 46.08, 5/29/2018   Weight: 254 lb (115.2 kg) Body mass index is 45.72 kg/(m^2).  6/26/2018   Weight: 248 lb (112.5 kg) bmi 44.64, 7/31/2018   Weight: 248 lb (112.5 kg) bmi 44.64, 9/4/2018   Weight: 246 lb (111.6 kg) bmi 44.28, 11/1/2018   Weight: (!) 242 lb (109.8 kg) bmi 43.56, 12/5/2018   Weight: (!) 235 lb (106.6 kg), 42.3, 1/23/19  Weight: (!) 236 lb 4.8 oz (107.2 kg) Body mass index is 42.53 kg/m . , 2/28/2019   Weight: (!) 239 lb (108.4 kg), bmi 43.02, 4/16/2019   Weight loss from initial: 26  % Weight loss: 9.81 %        Medication notes:   4/30/18 - intake.  4/26/18 IR evidenced by a1c 5.9, fast blood sugar 126 and waist circumfrence 50 in.     METFORMIN 500 MG PO Q DAY - prediabetes.  4/26/2018 - 11/1/2018    - LOST 17 LBS  -  a1c worsening despite weight loss. 4/2018 a1c was 5.9 and weight was 265 lbs, 9/2018 a1c was 6.3 and weight was 248 lbs.   11/1/2018 - metformin increased   METFORMIN XR 1000 MG PO Q DAY - prediabetes (worsening on metformin 500 xr)  11/1/2018 - 4/16/2019   Risks benefits discussed and need to monitor gfr inthe setting of gfr in 50s was discussed and informed consent was given.  Alternative of using saxenda also discussed.      WELLBUTRIN 150 MG 24 HR TAB DAILY  2/28/19  -never started because she read that it could interact with tamoxifen and she is still taking tamoxifen.     SAXENDA - recommended for weight loss and increasing a1c despite weight loss  11/1/2018 - discussed. (would need to discontinue metformin).     Vyvanse?   Could consider since intake indicates she binge eats.     NAC - consider if  needed for compulsive eating     CONTRAVE   I also thought about Contrave for her.  She was pretty emotional during her intake visit and tearful at times.  If it seems she has some depression ongoing during future visits she may benefit from Wellbutrin or Contrave.        pmh that could impact weight loss drug choices  Age: 68 y.o.   Contraception: postmenopausal  Glaucoma history - no  Do you have a history of osteopenia or osteoporosis? - yes.   Seizure history in self or family - yes  Hx of kidney stones - yes  History of pancreatitis - yes  History of bulemia -  no-   Bingeing - 0 times per week  compulsive or emotional eating  - yes  History of thyroid medullary cancer/ other endocrine cancer or fam hx of the same - breast cancer hormone sensitive.   How many oz of caffeinated beverages do you consume per day and what type? Maybe a cup of tea or cup of coffee once or twice a week.   How much alcohol do you drink per day? NONE  Do you have depression - YES  Do you have anxiety  - NO  Are you taking any antidepressants - NO  Do you get migraines - NONE  Do you smoke - FORMER SMOKER  Do you have night eating tendencies - YES  Have you ever had diabetes/ or prediabetes or metabolic syndrome -  YES  Do you have high blood pressure - NO  Do you drink a lot of pop  - NO  Do you have insomnia - NO  Do you have any chronic pain? YES  Do you have high cholesterol - IN THE PAST         PLAN  WEIGHT MAINTENANCE PHASE/ REGAINING  (BMI down from 47-42 - but now starting to REGAIN) hyperlipidemia resolved with weight reduction and a1c improving with weight reduction. If she is not losing weight / unhappy with weight loss - bariatric surgery would be indicated and that discussion is something I want to bring up when she is ready to discuss it.     CAP plan   Less than or = to 241 - control  242-245 - action   Greater than or equal to 246 - panic - needs immediate follow up     Continue Metformin      Continue supplements.Fish  oil (not taking consistently), vitamin D, multivitamin, 5 hydroxytryptophan, chromium    Barriers to losing weight:  Deals with arthritis pain, that makes it hard to be active. Sometimes binges.      Goal: Start meal planning and being more conscientious about eating.     Labs;  1/2019 normalized lipids noted (w 30 lb loss), normal b12, normal creatinine, impoved a1c from 6.3-6.2 (with 13 lb loss)  Plan repeat labs 5/2019.      FUTURE ORDERS: DEXA 9/2019.      Return in about 1 month for weight loss.

## 2019-04-17 ENCOUNTER — AMBULATORY - HEALTHEAST (OUTPATIENT)
Dept: INFUSION THERAPY | Facility: CLINIC | Age: 69
End: 2019-04-17

## 2019-04-17 ENCOUNTER — OFFICE VISIT - HEALTHEAST (OUTPATIENT)
Dept: ONCOLOGY | Facility: CLINIC | Age: 69
End: 2019-04-17

## 2019-04-17 ENCOUNTER — HOSPITAL ENCOUNTER (OUTPATIENT)
Dept: RADIOLOGY | Facility: CLINIC | Age: 69
Discharge: HOME | End: 2019-04-17
Attending: INTERNAL MEDICINE

## 2019-04-17 DIAGNOSIS — Z17.0 MALIGNANT NEOPLASM OF UPPER-OUTER QUADRANT OF RIGHT BREAST IN FEMALE, ESTROGEN RECEPTOR POSITIVE (H): ICD-10-CM

## 2019-04-17 DIAGNOSIS — M25.552 HIP PAIN, LEFT: ICD-10-CM

## 2019-04-17 DIAGNOSIS — Z79.810 USE OF TAMOXIFEN (NOLVADEX): ICD-10-CM

## 2019-04-17 DIAGNOSIS — C50.411 MALIGNANT NEOPLASM OF UPPER-OUTER QUADRANT OF RIGHT BREAST IN FEMALE, ESTROGEN RECEPTOR POSITIVE (H): ICD-10-CM

## 2019-04-17 DIAGNOSIS — M81.0 AGE-RELATED OSTEOPOROSIS WITHOUT CURRENT PATHOLOGICAL FRACTURE: ICD-10-CM

## 2019-04-17 DIAGNOSIS — Z12.31 ENCOUNTER FOR SCREENING MAMMOGRAM FOR MALIGNANT NEOPLASM OF BREAST: ICD-10-CM

## 2019-04-17 LAB
ALBUMIN SERPL-MCNC: 3.3 G/DL (ref 3.5–5)
ALP SERPL-CCNC: 81 U/L (ref 45–120)
ALT SERPL W P-5'-P-CCNC: 17 U/L (ref 0–45)
AST SERPL W P-5'-P-CCNC: 21 U/L (ref 0–40)
BILIRUB DIRECT SERPL-MCNC: 0.1 MG/DL
BILIRUB SERPL-MCNC: 0.3 MG/DL (ref 0–1)
PROT SERPL-MCNC: 6.6 G/DL (ref 6–8)

## 2019-04-24 ENCOUNTER — COMMUNICATION - HEALTHEAST (OUTPATIENT)
Dept: FAMILY MEDICINE | Facility: CLINIC | Age: 69
End: 2019-04-24

## 2019-04-24 DIAGNOSIS — R73.03 PREDIABETES: ICD-10-CM

## 2019-06-05 ENCOUNTER — COMMUNICATION - HEALTHEAST (OUTPATIENT)
Dept: FAMILY MEDICINE | Facility: CLINIC | Age: 69
End: 2019-06-05

## 2019-06-05 ENCOUNTER — OFFICE VISIT - HEALTHEAST (OUTPATIENT)
Dept: FAMILY MEDICINE | Facility: CLINIC | Age: 69
End: 2019-06-05

## 2019-06-05 DIAGNOSIS — F50.819 BINGE EATING DISORDER: ICD-10-CM

## 2019-06-05 DIAGNOSIS — E66.1 CLASS 3 DRUG-INDUCED OBESITY WITH SERIOUS COMORBIDITY AND BODY MASS INDEX (BMI) OF 40.0 TO 44.9 IN ADULT (H): ICD-10-CM

## 2019-06-05 DIAGNOSIS — M81.0 AGE-RELATED OSTEOPOROSIS WITHOUT CURRENT PATHOLOGICAL FRACTURE: ICD-10-CM

## 2019-06-05 DIAGNOSIS — E66.813 CLASS 3 DRUG-INDUCED OBESITY WITH SERIOUS COMORBIDITY AND BODY MASS INDEX (BMI) OF 40.0 TO 44.9 IN ADULT (H): ICD-10-CM

## 2019-06-05 DIAGNOSIS — R73.03 PREDIABETES: ICD-10-CM

## 2019-06-05 DIAGNOSIS — C50.411 MALIGNANT NEOPLASM OF UPPER-OUTER QUADRANT OF RIGHT BREAST IN FEMALE, ESTROGEN RECEPTOR POSITIVE (H): ICD-10-CM

## 2019-06-05 DIAGNOSIS — Z17.0 MALIGNANT NEOPLASM OF UPPER-OUTER QUADRANT OF RIGHT BREAST IN FEMALE, ESTROGEN RECEPTOR POSITIVE (H): ICD-10-CM

## 2019-06-05 DIAGNOSIS — G47.39 OTHER SLEEP APNEA: ICD-10-CM

## 2019-06-05 ASSESSMENT — MIFFLIN-ST. JEOR: SCORE: 1546.24

## 2019-06-05 NOTE — ASSESSMENT & PLAN NOTE
Dx:BMI 47.7 likely caused by chemotherapeutic agents used for breast cancer treatment. with multiple weight related comorbid conditions including breast cancer, prediabetes - starting to improve, hyperlipidemia (resolved with weight reduction), osteoarthritis, sleep apnea, and binge eating disorder.        Initial Weight: 265 lbs - bmi 47.7 4/26/2018  Weight: 256 lb (116.1 kg) bmi 46.08, 5/29/2018   Weight: 254 lb (115.2 kg) Body mass index is 45.72 kg/(m^2).  6/26/2018   Weight: 248 lb (112.5 kg) bmi 44.64, 7/31/2018   Weight: 248 lb (112.5 kg) bmi 44.64, 9/4/2018   Weight: 246 lb (111.6 kg) bmi 44.28, 11/1/2018   Weight: (!) 242 lb (109.8 kg) bmi 43.56, 12/5/2018   Weight: (!) 235 lb (106.6 kg), 42.3, 1/23/19  Weight: (!) 236 lb 4.8 oz (107.2 kg) Body mass index is 42.53 kg/m . , 2/28/2019   Weight: (!) 239 lb (108.4 kg), bmi 43.02, 4/16/2019   Weight: (!) 234 lb 12.8 oz (106.5 kg) bmi 42.26, 6/5/2019   Weight loss from initial: 30.2  % Weight loss: 11.4 %       Medication notes:   4/30/18 - intake.  4/26/18 IR evidenced by a1c 5.9, fast blood sugar 126 and waist circumfrence 50 in.     METFORMIN 500 MG PO Q DAY - prediabetes.  4/26/2018 - 11/1/2018    - LOST 17 LBS  -  a1c worsening despite weight loss. 4/2018 a1c was 5.9 and weight was 265 lbs, 9/2018 a1c was 6.3 and weight was 248 lbs.   11/1/2018 - metformin increased   11/1/2018 - - 6/5/2019  - she realized she never increased this medication. Although I had thought she was taking 1000 from 11/2018 - 6/5/19  METFORMIN XR 1000 MG PO Q DAY - prediabetes  6/5/2019 - start 1000 mg po q day.     WELLBUTRIN 150 MG 24 HR TAB DAILY  2/28/19  -never started because she read that it could interact with tamoxifen and she is still taking tamoxifen.     SAXENDA - recommended for weight loss and increasing a1c despite weight loss  11/1/2018 - discussed. (would need to discontinue metformin).     Vyvanse?   Could consider since intake indicates she binge eats.     NAC -  consider if needed for compulsive eating     CONTRAVE   I also thought about Contrave for her.  She was pretty emotional during her intake visit and tearful at times.  If it seems she has some depression ongoing during future visits she may benefit from Wellbutrin or Contrave.        pmh that could impact weight loss drug choices  Age: 68 y.o.   Contraception: postmenopausal  Glaucoma history - no  Do you have a history of osteopenia or osteoporosis? - yes.   Seizure history in self or family - yes  Hx of kidney stones - yes  History of pancreatitis - yes  History of bulemia -  no-   Bingeing - 0 times per week  compulsive or emotional eating  - yes  History of thyroid medullary cancer/ other endocrine cancer or fam hx of the same - breast cancer hormone sensitive.   How many oz of caffeinated beveragesdo you consume per day and what type? Maybe a cup of tea or cup of coffee once or twice a week.   How much alcohol do you drink per day? NONE  Do you have depression - YES  Do you have anxiety  - NO  Are you taking any antidepressants - NO  Do you get migraines - NONE  Do you smoke - FORMER SMOKER  Do you have night eating tendencies - YES  Have you ever had diabetes/ or prediabetes or metabolic syndrome -  YES  Do you have high blood pressure - NO  Do you drink a lot of pop  - NO  Do you have insomnia - NO  Do you have any chronic pain? YES  Do you have high cholesterol - IN THE PAST         PLAN  WEIGHT MAINTENANCE PHASE/ REGAINING  (BMI down from 47-42 - but now starting to REGAIN) hyperlipidemia resolved with weight reduction and a1c improving with weight reduction. If she is not losing weight / unhappy with weight loss - bariatric surgery would be indicated and that discussion is something I want to bring up when she is ready to discuss it.     CAP plan   Less than or = to 236- control  237-239 - action   Greater than or equal to 240 - panic - needs immediate follow up     Continue Metformin - she accidentally  was only taking 500 mg per day but I thought that she was taking 1000 since 11/2018.  Due to recheck a1c and titrate med. Would recommend 1000 if over 5.7 or 500 if under 5.7     Continue supplements.Fish oil (not taking consistently), vitamin D, multivitamin, 5 hydroxytryptophan, chromium     Barriers to losing weight:  Deals with arthritis pain, that makes it hard to be active. Sometimes binges.      Goal: try and decrease portion sizes.     Labs;  1/2019 normalized lipids noted (w 30 lb loss), normal b12, normal creatinine, impoved a1c from 6.3-6.2 (with 13 lbloss)  Plan repeat labs 5/2019.      FUTURE ORDERS: DEXA 9/2019.     PREDIABETEShovering 6.3 - 6.2.  Recommend recheck a1c to rule out progression to diabetes. Order placed.    HISTORY BREAST CANCER  She follows up with oncology every 6 months, gets an annual mammogram     OSTEOPOROSIS/OSTEOPENIA  DEXA scan that showed osteoporosis improving and is now osteopenia instead.  DEXA due again 9/2019    SLEEP APNEA  She did have her CPAP adjusted 10/1/2018 but she has lost 12 pounds since then so I have recommended that she visit the sleep specialist again to get her CPAP adjusted again.    BINGE EATING/BOREDOM EATING/COMPULSIVE EATING  She says that she is doing a lot better with this because she distracts herself by working outside in her garden, and when she does that she finds that she does not feel hungry.  She wants to consider the 24-week intensive lifestyle plan in the fall when it may be more difficult for her to get outside and distract herself.     Return in about 1 month for weight loss.

## 2019-06-05 NOTE — ASSESSMENT & PLAN NOTE
She did have her CPAP adjusted 10/1/2018 but she has lost 12 pounds since then so I have recommended that she visit the sleep specialist again to get her CPAP adjusted again.

## 2019-06-05 NOTE — ASSESSMENT & PLAN NOTE
BINGE EATING/BOREDOM EATING/COMPULSIVE EATING  She says that she is doing a lot better with this because she distracts herself by working outside in her garden, and when she does that she finds that she does not feel hungry.

## 2019-07-09 ENCOUNTER — HOSPITAL ENCOUNTER (OUTPATIENT)
Dept: LAB | Age: 69
Setting detail: SPECIMEN
Discharge: HOME | End: 2019-07-09

## 2019-07-09 ENCOUNTER — OFFICE VISIT - HEALTHEAST (OUTPATIENT)
Dept: FAMILY MEDICINE | Facility: CLINIC | Age: 69
End: 2019-07-09

## 2019-07-09 DIAGNOSIS — R73.03 PREDIABETES: ICD-10-CM

## 2019-07-09 DIAGNOSIS — Z51.81 ENCOUNTER FOR THERAPEUTIC DRUG MONITORING: ICD-10-CM

## 2019-07-09 DIAGNOSIS — E66.813 CLASS 3 DRUG-INDUCED OBESITY WITH SERIOUS COMORBIDITY AND BODY MASS INDEX (BMI) OF 40.0 TO 44.9 IN ADULT (H): ICD-10-CM

## 2019-07-09 DIAGNOSIS — E66.1 CLASS 3 DRUG-INDUCED OBESITY WITH SERIOUS COMORBIDITY AND BODY MASS INDEX (BMI) OF 40.0 TO 44.9 IN ADULT (H): ICD-10-CM

## 2019-07-09 LAB
ALBUMIN SERPL-MCNC: 3.4 G/DL (ref 3.5–5)
ALP SERPL-CCNC: 67 U/L (ref 45–120)
ALT SERPL W P-5'-P-CCNC: 17 U/L (ref 0–45)
ANION GAP SERPL CALCULATED.3IONS-SCNC: 9 MMOL/L (ref 5–18)
AST SERPL W P-5'-P-CCNC: 20 U/L (ref 0–40)
BILIRUB SERPL-MCNC: 0.4 MG/DL (ref 0–1)
BUN SERPL-MCNC: 18 MG/DL (ref 8–22)
CALCIUM SERPL-MCNC: 9.4 MG/DL (ref 8.5–10.5)
CHLORIDE BLD-SCNC: 110 MMOL/L (ref 98–107)
CO2 SERPL-SCNC: 24 MMOL/L (ref 22–31)
CREAT SERPL-MCNC: 0.91 MG/DL (ref 0.6–1.1)
GFR SERPL CREATININE-BSD FRML MDRD: >60 ML/MIN/1.73M2
GLUCOSE BLD-MCNC: 102 MG/DL (ref 70–125)
HBA1C MFR BLD: 5.9 % (ref 3.5–6)
POTASSIUM BLD-SCNC: 4.8 MMOL/L (ref 3.5–5)
PROT SERPL-MCNC: 6.6 G/DL (ref 6–8)
SODIUM SERPL-SCNC: 143 MMOL/L (ref 136–145)

## 2019-07-09 ASSESSMENT — MIFFLIN-ST. JEOR: SCORE: 1550.77

## 2019-07-09 NOTE — ASSESSMENT & PLAN NOTE
Dx:BMI 47.7 likely caused by chemotherapeutic agents used for breast cancer treatment. with multiple weight related comorbid conditions including breast cancer, prediabetes - starting to improve, hyperlipidemia (resolved with weight reduction), osteoarthritis, sleep apnea, and binge eating disorder.        Initial Weight: 265 lbs - bmi 47.7 4/26/2018  Weight: 256 lb (116.1 kg) bmi 46.08, 5/29/2018   Weight: 254 lb (115.2 kg) Body mass index is 45.72 kg/(m^2).  6/26/2018   Weight: 248 lb (112.5 kg) bmi 44.64, 7/31/2018   Weight: 248 lb (112.5 kg) bmi 44.64, 9/4/2018   Weight: 246 lb (111.6 kg) bmi 44.28, 11/1/2018   Weight: (!) 242 lb (109.8 kg) bmi 43.56, 12/5/2018   Weight: (!) 235 lb (106.6 kg), 42.3, 1/23/19  Weight: (!) 236 lb 4.8 oz (107.2 kg) Body mass index is 42.53 kg/m . , 2/28/2019   Weight: (!) 239 lb (108.4 kg), bmi 43.02, 4/16/2019   Weight: (!) 234 lb 12.8 oz bmi 42.26, 6/5/2019   Weight: (!) 235 lb 12.8 oz (107 kg), bmi 42.44, 7/9/2019   Weight loss from initial: 29.2  % Weight loss: 11.02 %    Medication notes:   4/30/18 - intake.  4/26/18 IR evidenced by a1c 5.9, fast blood sugar 126 and waist circumfrence 50 in.     METFORMIN 500 MG PO Q DAY - prediabetes.  4/26/2018 - 11/1/2018    - LOST 17 LBS  -  a1c worsening despite weight loss. 4/2018 a1c was 5.9 and weight was 265 lbs, 9/2018 a1c was 6.3 and weight was 248 lbs.   11/1/2018 - metformin increased   11/1/2018 - - 6/5/2019  - she realized she never increased this medication. Although I had thought she was taking 1000 from 11/2018 - 6/5/19  METFORMIN XR 1000 MG PO Q DAY - prediabetes  6/5/2019 - 7/9/2019 1000 mg po q day.      WELLBUTRIN 150 MG 24 HR TAB DAILY  2/28/19  -never started because she read that it could interact with tamoxifen and she is still taking tamoxifen.     SAXENDA - recommended for weight loss and increasing a1c despite weight loss  11/1/2018 & 7/9/2019  - discussed. (would need to discontinue metformin).     Vyvanse?   Could  consider since intake indicates she binge eats.     - consider if needed for compulsive eating     CONTRAVE   I also thought about Contrave for her.  She was pretty emotional during her intake visit and tearful at times.  If it seems she has some depression ongoing during future visits she may benefit from Wellbutrin or Contrave.        pmh that could impact weight loss drug choices  Age: 68 y.o.   Contraception: postmenopausal  Glaucoma history - no  Do you have a history of osteopenia or osteoporosis? - yes.   Seizure history in self or family - yes  Hx of kidney stones - yes  History of pancreatitis - yes  History of bulemia -  no-   Bingeing - 0 times per week  compulsive or emotional eating  - yes  History of thyroid medullary cancer/ other endocrine cancer or fam hx of the same - breast cancer hormone sensitive.   How many oz of caffeinated beverages do you consume per day and what type? Maybe a cup of tea or cup of coffee once or twice a week.   How much alcohol do you drink per day? NONE  Do you have depression - YES  Do you have anxiety  - NO  Are you taking any antidepressants - NO  Do you get migraines - NONE  Do you smoke - FORMER SMOKER  Do you have night eating tendencies - YES  Have you ever had diabetes/ or prediabetes or metabolic syndrome - YES  Do you have high blood pressure - NO  Do you drink a lot of pop  - NO  Do you have insomnia - NO  Do you have any chronic pain? YES  Do you have high cholesterol - IN THE PAST         PLAN  WEIGHT MAINTENANCE PHASE/ REGAINING  (BMI down from 47-42 - but now starting to REGAIN) hyperlipidemia resolved with weight reduction and a1c improving with weight reduction. If she is not losing weight / unhappy with weight loss - bariatric surgery would be indicated and that discussion is something I want to bring up when she is ready to discuss it.     CAP plan   Less than or = to 237- control  238-240 - action   Greater than or equal to 241 - panic - needs immediate  follow up     Continue Metformin - she accidentally was only taking 500 mg per day but I thought that she was taking 1000 since 11/2018.  Due to recheck a1c and titrate med. Would recommend 1000 if over 5.7 or 500 if under 5.7     Continue supplements.Fish oil (not taking consistently), vitamin D, multivitamin, 5 hydroxytryptophan, chromium     Barriers to losing weight:  Deals with arthritis pain, that makes it hard to be active. Sometimes binges.      Goal: look at saxenda website and call insurance to consider cost of saxenda for possible future use.     Labs;  1/2019 normalized lipids noted (w 30 lb loss), normal b12, normal creatinine, impoved a1c from 6.3-6.2 (with 13 lb loss)  Plan repeat labs 5/2019.      FUTURE ORDERS: DEXA 9/2019.      PREDIABETES  a1c hovering 6.3 - 6.2.  Recommend recheck a1c to rule out progression to diabetes. Ordered again today.     HISTORY BREAST CANCER  She follows up with oncology every 6 months, gets an annual mammogram  -should be seen in the fall 2019 by oncology     OSTEOPOROSIS/OSTEOPENIA  DEXA scan that showed osteoporosis improving and is now osteopenia instead.  DEXA due again 9/2019 -points to get this through her primary care physician who is at [facility]     SLEEP APNEA  She says the CPAP is fitting her fine and it has adjustable straps so she does not feel it needs to be adjusted further.  She is wearing her CPAP machine regularly, and notes that she is been having a lot of trouble with her insurance in regards to payment of the CPAP machine.     BINGE EATING/BOREDOM EATING/COMPULSIVE EATING  She says that she is doing a lot better with this because she distracts herself by working outside in her garden, and when she does that she finds that she does notfeel hungry.  She wants to consider the 24-week intensive lifestyle plan in the fall when it may be more difficult for her to get outside and distract herself.     Return in about 1 month for weight loss.

## 2019-08-26 ENCOUNTER — COMMUNICATION - HEALTHEAST (OUTPATIENT)
Dept: ONCOLOGY | Facility: CLINIC | Age: 69
End: 2019-08-26

## 2019-09-23 ENCOUNTER — HOSPITAL ENCOUNTER (OUTPATIENT)
Dept: MAMMOGRAPHY | Facility: CLINIC | Age: 69
Setting detail: RADIATION/ONCOLOGY SERIES
Discharge: HOME | End: 2019-09-23
Attending: INTERNAL MEDICINE

## 2019-09-23 DIAGNOSIS — Z17.0 MALIGNANT NEOPLASM OF UPPER-OUTER QUADRANT OF RIGHT BREAST IN FEMALE, ESTROGEN RECEPTOR POSITIVE (H): ICD-10-CM

## 2019-09-23 DIAGNOSIS — C50.411 MALIGNANT NEOPLASM OF UPPER-OUTER QUADRANT OF RIGHT BREAST IN FEMALE, ESTROGEN RECEPTOR POSITIVE (H): ICD-10-CM

## 2019-09-23 DIAGNOSIS — Z12.31 ENCOUNTER FOR SCREENING MAMMOGRAM FOR MALIGNANT NEOPLASM OF BREAST: ICD-10-CM

## 2019-09-24 ENCOUNTER — OFFICE VISIT - HEALTHEAST (OUTPATIENT)
Dept: FAMILY MEDICINE | Facility: CLINIC | Age: 69
End: 2019-09-24

## 2019-09-24 DIAGNOSIS — C50.411 MALIGNANT NEOPLASM OF UPPER-OUTER QUADRANT OF RIGHT BREAST IN FEMALE, ESTROGEN RECEPTOR POSITIVE (H): ICD-10-CM

## 2019-09-24 DIAGNOSIS — E66.1 CLASS 3 DRUG-INDUCED OBESITY WITH SERIOUS COMORBIDITY AND BODY MASS INDEX (BMI) OF 40.0 TO 44.9 IN ADULT (H): ICD-10-CM

## 2019-09-24 DIAGNOSIS — G47.39 OTHER SLEEP APNEA: ICD-10-CM

## 2019-09-24 DIAGNOSIS — F50.819 BINGE EATING DISORDER: ICD-10-CM

## 2019-09-24 DIAGNOSIS — Z17.0 MALIGNANT NEOPLASM OF UPPER-OUTER QUADRANT OF RIGHT BREAST IN FEMALE, ESTROGEN RECEPTOR POSITIVE (H): ICD-10-CM

## 2019-09-24 DIAGNOSIS — R73.03 PREDIABETES: ICD-10-CM

## 2019-09-24 DIAGNOSIS — E66.813 CLASS 3 DRUG-INDUCED OBESITY WITH SERIOUS COMORBIDITY AND BODY MASS INDEX (BMI) OF 40.0 TO 44.9 IN ADULT (H): ICD-10-CM

## 2019-09-24 ASSESSMENT — MIFFLIN-ST. JEOR: SCORE: 1567.55

## 2019-09-24 NOTE — ASSESSMENT & PLAN NOTE
She says that she is doing a lot better with this because she distracts herself by working outside in her garden, and when she does that she finds that she does not feel hungry.  She wants to consider the 24-week intensive lifestyle plan in the fall when it may be more difficult for her to get outside and distract herself.

## 2019-09-24 NOTE — ASSESSMENT & PLAN NOTE
NEEDS REPEAT DEXA  DEXA scan that showed osteoporosis improving and is now osteopenia instead.  DEXA due again 9/2019 -points to get this through her primary care physician who is at [facility]    FUTURE ORDERS: DEXA 9/2019. - she says she has this scheduled for next week.

## 2019-09-24 NOTE — ASSESSMENT & PLAN NOTE
IMPROVED  A1c is down from 6.2-5.9 as of 7/9/2019.  Continue metformin 500 mg p.o. daily plan to recheck A1c in October 2019.  Could increase metformin if A1c continues in the prediabetic range.      Continue weight loss efforts, see bmi in the problem list.

## 2019-09-24 NOTE — ASSESSMENT & PLAN NOTE
IMPROVED  She says the CPAP is fitting her fine and it has adjustable straps so she does not feel it needs to be adjusted further.  She is wearing her CPAP machine regularly,and notes that she is been having a lot of trouble with her insurance in regards to payment of the CPAP machine.    Weight reduction recommended.

## 2019-09-24 NOTE — ASSESSMENT & PLAN NOTE
DIFFICULTY MAINTAINING WEIGHT LOSS - CONTINUES TO WORK ON THIS.   Dx:BMI 47.7 likely caused by chemotherapeutic agents used for breast cancer treatment. with multiple weight related comorbid conditions including breast cancer, prediabetes - starting to improve, hyperlipidemia (resolved with weight reduction), osteoarthritis, sleep apnea, and binge eating disorder.        Initial Weight: 265 lbs - bmi 47.7 4/26/2018  Weight: 256 lb (116.1 kg) bmi 46.08, 5/29/2018   Weight: 254 lb (115.2 kg) Body mass index is 45.72 kg/(m^2).  6/26/2018   Weight: 248 lb (112.5 kg) bmi 44.64, 7/31/2018   Weight: 248 lb (112.5 kg) bmi 44.64, 9/4/2018   Weight: 246 lb (111.6 kg) bmi 44.28, 11/1/2018   Weight: (!) 242 lb (109.8 kg) bmi 43.56, 12/5/2018   Weight: (!) 235 lb (106.6 kg), 42.3, 1/23/19  Weight: (!) 236 lb 4.8 oz (107.2 kg) Body mass index is 42.53 kg/m . , 2/28/2019   Weight: (!) 239 lb (108.4 kg), bmi 43.02, 4/16/2019 (!) 234 lb 12.8 oz bmi 42.26, 6/5/2019   Weight: (!) 235 lb 12.8 oz (107 kg), bmi 42.44, 7/9/2019   Weight: (!)239 lb 8 oz , bmi 43.11, 9/24/2019   Weight loss from initial: 25.5  % Weight loss: 9.62 %        Medication notes:   4/30/18 - intake.  4/26/18 IR evidenced by a1c 5.9, fast blood sugar 126 and waist circumfrence 50 in.     METFORMIN 500 MG PO Q DAY - prediabetes.  4/26/2018 - 11/1/2018    - LOST 17 LBS  -  a1c worsening despite weight loss. 4/2018 a1c was 5.9 and weight was 265 lbs, 9/2018 a1c was 6.3 and weight was 248 lbs.   11/1/2018 - metformin increased   11/1/2018 - - 6/5/2019  - she realized she never increased this medication. Although I had thought she was taking 1000 from 11/2018 - 6/5/19  METFORMIN XR 1000 MG PO Q DAY - prediabetes  6/5/2019 - 9/24/2019  1000 mg po q day.      WELLBUTRIN 150 MG 24 HR TAB DAILY  2/28/19  -never started because she read that it could interact with tamoxifen and she is still taking tamoxifen.     SAXENDA - recommended for weight loss and increasing a1c  despite weight loss  11/1/2018 & 7/9/2019  - discussed. (would need to discontinue metformin).     Vyvanse?   Could consider since intake indicates she binge eats.     NAC - consider if needed for compulsive eating     CONTRAVE   I also thought about Contrave for her.  She was pretty emotional during her intake visit and tearful at times.  If it seems she has some depression ongoing during future visits she may benefit from Wellbutrin or Contrave.        pmh that could impact weight loss drug choices  Age: 68 y.o.   Contraception: postmenopausal  Glaucoma history - no  Do you have a history of osteopenia or osteoporosis? - yes.   Seizure history in self or family - yes  Hx of kidney stones - yes  History of pancreatitis - yes  History of bulemia -  no-   Bingeing - 0 times per week  compulsive or emotional eating  yes  History of thyroid medullary cancer/ other endocrine cancer or fam hx of the same - breast cancer hormone sensitive.   How many oz of caffeinated beverages do you consume per day and what type? Maybe a cup of tea or cup of coffee once or twice a week.   How much alcohol do you drink per day? NONE  Do you have depression - YES  Do you have anxiety  - NO  Are you taking any antidepressants - NO  Do you get migraines - NONE  Do you smoke - FORMER SMOKER  Do you have night eating tendencies - YES  Have you ever had diabetes/ or prediabetes or metabolic syndrome -  YES  Do you have high blood pressure - NO  Do you drink a lot of pop  - NO  Do you have insomnia - NO  Do you have any chronic pain? YES  Do you have high cholesterol - IN THE PAST         PLAN  WEIGHT MAINTENANCE PHASE/ REGAINING  (BMI down from 47-42 - but now starting to REGAIN to bmi 43)     hyperlipidemia resolved with weight reduction and a1c improving with weight reduction. If she is not losing weight / unhappy with weight loss - bariatric surgery would be indicated and that discussion is something I want to bring up when she is ready to  discuss it, she is not ready now.     CAP plan   Less than or = to 241- control  243-245 - action   Greater than or equal to 246 - panic - needs immediate follow up     Continue Metformin 500 mg po q day - a1c down from 6.2 to 5.9 (could increase in future)     Continue supplements.Fish oil (not taking consistently), vitamin D, multivitamin, 5 hydroxytryptophan, chromium     Barriers to losing weight:  Deals with arthritis pain, that makes it hard to be active. Sometimes binges. Bread machine. Little exercise. Low motivation.     Goal: look at saxenda website and call insurance to consider cost of saxenda for possible future use.      Return in about 1 month for weight loss.

## 2019-10-17 ENCOUNTER — COMMUNICATION - HEALTHEAST (OUTPATIENT)
Dept: FAMILY MEDICINE | Facility: CLINIC | Age: 69
End: 2019-10-17

## 2019-10-23 ENCOUNTER — OFFICE VISIT - HEALTHEAST (OUTPATIENT)
Dept: FAMILY MEDICINE | Facility: CLINIC | Age: 69
End: 2019-10-23

## 2019-10-23 ENCOUNTER — OFFICE VISIT - HEALTHEAST (OUTPATIENT)
Dept: ONCOLOGY | Facility: CLINIC | Age: 69
End: 2019-10-23

## 2019-10-23 ENCOUNTER — AMBULATORY - HEALTHEAST (OUTPATIENT)
Dept: INFUSION THERAPY | Facility: CLINIC | Age: 69
End: 2019-10-23

## 2019-10-23 DIAGNOSIS — C50.411 MALIGNANT NEOPLASM OF UPPER-OUTER QUADRANT OF RIGHT BREAST IN FEMALE, ESTROGEN RECEPTOR POSITIVE (H): ICD-10-CM

## 2019-10-23 DIAGNOSIS — M81.0 AGE-RELATED OSTEOPOROSIS WITHOUT CURRENT PATHOLOGICAL FRACTURE: ICD-10-CM

## 2019-10-23 DIAGNOSIS — R73.03 PREDIABETES: ICD-10-CM

## 2019-10-23 DIAGNOSIS — E66.1 CLASS 3 DRUG-INDUCED OBESITY WITH SERIOUS COMORBIDITY AND BODY MASS INDEX (BMI) OF 40.0 TO 44.9 IN ADULT (H): ICD-10-CM

## 2019-10-23 DIAGNOSIS — Z17.0 MALIGNANT NEOPLASM OF UPPER-OUTER QUADRANT OF RIGHT BREAST IN FEMALE, ESTROGEN RECEPTOR POSITIVE (H): ICD-10-CM

## 2019-10-23 DIAGNOSIS — Z79.810 USE OF TAMOXIFEN (NOLVADEX): ICD-10-CM

## 2019-10-23 DIAGNOSIS — E78.2 MIXED HYPERLIPIDEMIA: ICD-10-CM

## 2019-10-23 DIAGNOSIS — E66.813 CLASS 3 DRUG-INDUCED OBESITY WITH SERIOUS COMORBIDITY AND BODY MASS INDEX (BMI) OF 40.0 TO 44.9 IN ADULT (H): ICD-10-CM

## 2019-10-23 ASSESSMENT — MIFFLIN-ST. JEOR: SCORE: 1560.75

## 2019-10-23 NOTE — ASSESSMENT & PLAN NOTE
IMPROVED  a1c down from 6.2 to 5.9 between January and July ( weight was stable at 235 throughout that time)- she has actually not lost weight in that timebut has been working on her lifestyle and diet and this is clearly having a positive impact on her health.     Continued weight loss efforts recommended. Plan to recheck a1c at follow up

## 2019-10-23 NOTE — ASSESSMENT & PLAN NOTE
STABLE/ WORSENING    DIFFICULTY MAINTAINING WEIGHT LOSS - CONTINUES TO WORK ON THIS.   47.7 likely caused by chemotherapeutic agents used for breast cancer treatment. with multiple weight related comorbid conditions including breast cancer, prediabetes - starting to improve, hyperlipidemia (resolved with weight reduction), osteoarthritis, sleep apnea, and binge eating disorder.        Initial Weight: 265 lbs - bmi 47.7 4/26/2018  Weight: 256 lb (116.1 kg) bmi 46.08, 5/29/2018   Weight: 254 lb (115.2 kg) Body mass index is 45.72 kg/(m^2).  6/26/2018   Weight: 248 lb (112.5 kg) bmi 44.64, 7/31/2018   Weight: 248 lb (112.5 kg) bmi 44.64, 9/4/2018   Weight: 246 lb (111.6 kg) bmi 44.28, 11/1/2018   Weight: (!) 242 lb (109.8 kg) bmi 43.56, 12/5/2018   Weight: (!) 235 lb (106.6 kg), 42.3, 1/23/19  Weight: (!) 236 lb 4.8 oz (107.2 kg) Body mass index is 42.53 kg/m . , 2/28/2019   Weight: (!) 239 lb (108.4 kg), bmi 43.02, 4/16/2019   Weight: (!) 234 lb 12.8 oz bmi 42.26, 6/5/2019   Weight: (!) 235 lb 12.8 oz (107 kg), bmi 42.44, 7/9/2019   Weight:(!) 239 lb 8 oz , bmi 43.11, 9/24/2019   Weight: (!) 239 lb (108.4 kg), BMI 43, 10/23/2019   Weight loss from initial: 27  % Weight loss: 10.19 %      Medication notes:   4/30/18 - intake.  4/26/18 IR evidenced by a1c 5.9, fast blood sugar 126 and waist circumfrence 50 in.     METFORMIN  - prediabetes.  4/26/2018 -  6/5/19 500 MG POQ DAY  METFORMIN XR 1000 MG PO Q DAY - prediabetes  6/5/2019 - 9/24/2019  1000 mg po q day.      WELLBUTRIN 150 MG 24 HR TAB DAILY  2/28/19  -never started because she read that it could interact with tamoxifen and she is still taking tamoxifen.     SAXENDA - recommended for weight loss and increasing a1c despite weight loss  11/1/2018 & 7/9/2019  - discussed. (would need to discontinue metformin).     Vyvanse?   Could consider since intake indicates she binge eats.     NAC - consider if needed for compulsive eating     CONTRAVE   I also thought about  Contrave for her.  She was pretty emotional during her intake visit and tearful at times.  If it seems she has some depression ongoing during future visits she may benefit from Wellbutrin or Contrave.        pmh that could impact weight loss drug choices  Age: 68 y.o.   Contraception: postmenopausal  Glaucoma history - no  Do you have a history of osteopenia or osteoporosis? - yes.   Seizure history in self or family - yes  Hx of kidney stones - yes  History of pancreatitis - yes  History of bulemia -  no-   Bingeing - 0 times per week  compulsive or emotional eating  - yes  History of thyroid medullary cancer/ other endocrine cancer or fam hx of the same - breast cancer hormone sensitive.   How many oz of caffeinated beverages do you consume per day and what type? Maybe a cup of tea or cup of coffee once or twice a week.   How much alcohol do you drink per day? NONE  Do you have depression - YES  Do you have anxiety  - NO  Are you taking any antidepressants - NO  Doyou get migraines - NONE  Do you smoke - FORMER SMOKER  Do you have night eating tendencies - YES  Have you ever had diabetes/ or prediabetes or metabolic syndrome -  YES  Do you have high blood pressure - NO  Do you drink a lot of pop  - NO  Do you have insomnia - NO  Do you have any chronic pain? YES  Do you have high cholesterol - IN THE PAST         PLAN  WEIGHT MAINTENANCE PHASE/ REGAINING  (BMI down from 47-42 - but now starting to REGAIN to bmi 43)     CAP plan   Less than or = to 241- control  243-245 - action   Greater than or equal to 246 - panic - needs immediate follow up     Continue Metformin 1000 mg po q day- a1c down from 6.2 to 5.9 (could increase in future)     Continue supplements.Fish oil (not taking consistently), vitamin D, multivitamin, 5 hydroxytryptophan, chromium     Patient frustrated with health and inability to exercise due to pain. We discussed weight loss surgery and she is beginning to contemplate this so a video on weight  loss surgery was given and she will take a look     Return in about 1 month for weight loss and to discuss weight loss surgery video if she desires she will also discuss health  with her  (24 wk and 3 pack)

## 2019-10-23 NOTE — ASSESSMENT & PLAN NOTE
WORSENING  Patient very disappointed, she thought her exercise efforts would have helped, but she now realizes that the 10 min of walking she can do before she needs to rest is not enough to build bone. We discussed that weight loss may help her be more active and then she could potentially do more weight bearing exercise to maintain the bone strength that she still has.     Weight loss surgery discussed.

## 2020-01-07 ENCOUNTER — COMMUNICATION - HEALTHEAST (OUTPATIENT)
Dept: SURGERY | Facility: CLINIC | Age: 70
End: 2020-01-07

## 2020-02-20 ENCOUNTER — AMBULATORY - HEALTHEAST (OUTPATIENT)
Dept: ENDOCRINOLOGY | Facility: CLINIC | Age: 70
End: 2020-02-20

## 2020-02-20 DIAGNOSIS — M81.0 OSTEOPOROSIS: ICD-10-CM

## 2020-03-13 ENCOUNTER — COMMUNICATION - HEALTHEAST (OUTPATIENT)
Dept: ENDOCRINOLOGY | Facility: CLINIC | Age: 70
End: 2020-03-13

## 2020-03-13 ENCOUNTER — OFFICE VISIT - HEALTHEAST (OUTPATIENT)
Dept: ENDOCRINOLOGY | Facility: CLINIC | Age: 70
End: 2020-03-13

## 2020-03-13 DIAGNOSIS — M81.8 OTHER OSTEOPOROSIS WITHOUT CURRENT PATHOLOGICAL FRACTURE: ICD-10-CM

## 2020-03-16 ENCOUNTER — COMMUNICATION - HEALTHEAST (OUTPATIENT)
Dept: ENDOCRINOLOGY | Facility: CLINIC | Age: 70
End: 2020-03-16

## 2020-03-19 ENCOUNTER — COMMUNICATION - HEALTHEAST (OUTPATIENT)
Dept: ONCOLOGY | Facility: CLINIC | Age: 70
End: 2020-03-19

## 2020-03-23 ENCOUNTER — COMMUNICATION - HEALTHEAST (OUTPATIENT)
Dept: ONCOLOGY | Facility: CLINIC | Age: 70
End: 2020-03-23

## 2020-03-23 DIAGNOSIS — C50.411 MALIGNANT NEOPLASM OF UPPER-OUTER QUADRANT OF RIGHT BREAST IN FEMALE, ESTROGEN RECEPTOR POSITIVE (H): ICD-10-CM

## 2020-03-23 DIAGNOSIS — Z17.0 MALIGNANT NEOPLASM OF UPPER-OUTER QUADRANT OF RIGHT BREAST IN FEMALE, ESTROGEN RECEPTOR POSITIVE (H): ICD-10-CM

## 2020-03-30 ENCOUNTER — COMMUNICATION - HEALTHEAST (OUTPATIENT)
Dept: ONCOLOGY | Facility: CLINIC | Age: 70
End: 2020-03-30

## 2020-03-31 ENCOUNTER — COMMUNICATION - HEALTHEAST (OUTPATIENT)
Dept: ADMINISTRATIVE | Facility: HOSPITAL | Age: 70
End: 2020-03-31

## 2020-04-22 ENCOUNTER — OFFICE VISIT - HEALTHEAST (OUTPATIENT)
Dept: ONCOLOGY | Facility: CLINIC | Age: 70
End: 2020-04-22

## 2020-04-22 DIAGNOSIS — C50.411 MALIGNANT NEOPLASM OF UPPER-OUTER QUADRANT OF RIGHT BREAST IN FEMALE, ESTROGEN RECEPTOR POSITIVE (H): ICD-10-CM

## 2020-04-22 DIAGNOSIS — Z12.31 ENCOUNTER FOR SCREENING MAMMOGRAM FOR MALIGNANT NEOPLASM OF BREAST: ICD-10-CM

## 2020-04-22 DIAGNOSIS — Z17.0 MALIGNANT NEOPLASM OF UPPER-OUTER QUADRANT OF RIGHT BREAST IN FEMALE, ESTROGEN RECEPTOR POSITIVE (H): ICD-10-CM

## 2020-04-22 DIAGNOSIS — Z79.810 USE OF TAMOXIFEN (NOLVADEX): ICD-10-CM

## 2020-04-22 DIAGNOSIS — M81.0 AGE-RELATED OSTEOPOROSIS WITHOUT CURRENT PATHOLOGICAL FRACTURE: ICD-10-CM

## 2020-10-01 ENCOUNTER — HOSPITAL ENCOUNTER (OUTPATIENT)
Dept: MAMMOGRAPHY | Facility: CLINIC | Age: 70
Setting detail: RADIATION/ONCOLOGY SERIES
Discharge: STILL ADMITTED OR ADMINISTRATIVE | End: 2020-10-01
Attending: INTERNAL MEDICINE

## 2020-10-01 DIAGNOSIS — C50.411 MALIGNANT NEOPLASM OF UPPER-OUTER QUADRANT OF RIGHT BREAST IN FEMALE, ESTROGEN RECEPTOR POSITIVE (H): ICD-10-CM

## 2020-10-01 DIAGNOSIS — Z17.0 MALIGNANT NEOPLASM OF UPPER-OUTER QUADRANT OF RIGHT BREAST IN FEMALE, ESTROGEN RECEPTOR POSITIVE (H): ICD-10-CM

## 2020-10-01 DIAGNOSIS — Z12.31 ENCOUNTER FOR SCREENING MAMMOGRAM FOR MALIGNANT NEOPLASM OF BREAST: ICD-10-CM

## 2020-10-06 ENCOUNTER — OFFICE VISIT - HEALTHEAST (OUTPATIENT)
Dept: ONCOLOGY | Facility: CLINIC | Age: 70
End: 2020-10-06

## 2020-10-06 DIAGNOSIS — C50.411 MALIGNANT NEOPLASM OF UPPER-OUTER QUADRANT OF RIGHT BREAST IN FEMALE, ESTROGEN RECEPTOR POSITIVE (H): ICD-10-CM

## 2020-10-06 DIAGNOSIS — Z17.0 MALIGNANT NEOPLASM OF UPPER-OUTER QUADRANT OF RIGHT BREAST IN FEMALE, ESTROGEN RECEPTOR POSITIVE (H): ICD-10-CM

## 2020-10-06 DIAGNOSIS — M81.0 AGE-RELATED OSTEOPOROSIS WITHOUT CURRENT PATHOLOGICAL FRACTURE: ICD-10-CM

## 2020-10-06 DIAGNOSIS — Z79.810 USE OF TAMOXIFEN (NOLVADEX): ICD-10-CM

## 2020-10-06 DIAGNOSIS — Z23 ENCOUNTER FOR IMMUNIZATION: ICD-10-CM

## 2021-01-20 ENCOUNTER — OFFICE VISIT - HEALTHEAST (OUTPATIENT)
Dept: ONCOLOGY | Facility: CLINIC | Age: 71
End: 2021-01-20

## 2021-01-20 DIAGNOSIS — C50.411 MALIGNANT NEOPLASM OF UPPER-OUTER QUADRANT OF RIGHT BREAST IN FEMALE, ESTROGEN RECEPTOR POSITIVE (H): ICD-10-CM

## 2021-01-20 DIAGNOSIS — Z79.810 USE OF TAMOXIFEN (NOLVADEX): ICD-10-CM

## 2021-01-20 DIAGNOSIS — E66.813 CLASS 3 DRUG-INDUCED OBESITY WITH SERIOUS COMORBIDITY AND BODY MASS INDEX (BMI) OF 40.0 TO 44.9 IN ADULT (H): ICD-10-CM

## 2021-01-20 DIAGNOSIS — Z17.0 MALIGNANT NEOPLASM OF UPPER-OUTER QUADRANT OF RIGHT BREAST IN FEMALE, ESTROGEN RECEPTOR POSITIVE (H): ICD-10-CM

## 2021-01-20 DIAGNOSIS — Z12.31 ENCOUNTER FOR SCREENING MAMMOGRAM FOR MALIGNANT NEOPLASM OF BREAST: ICD-10-CM

## 2021-01-20 DIAGNOSIS — E66.1 CLASS 3 DRUG-INDUCED OBESITY WITH SERIOUS COMORBIDITY AND BODY MASS INDEX (BMI) OF 40.0 TO 44.9 IN ADULT (H): ICD-10-CM

## 2021-01-20 DIAGNOSIS — M81.0 AGE-RELATED OSTEOPOROSIS WITHOUT CURRENT PATHOLOGICAL FRACTURE: ICD-10-CM

## 2021-03-04 ENCOUNTER — COMMUNICATION - HEALTHEAST (OUTPATIENT)
Dept: LAB | Facility: CLINIC | Age: 71
End: 2021-03-04

## 2021-03-04 DIAGNOSIS — M81.0 AGE-RELATED OSTEOPOROSIS WITHOUT CURRENT PATHOLOGICAL FRACTURE: ICD-10-CM

## 2021-05-27 NOTE — PROGRESS NOTES
ASSESSMENT AND PLAN:     Problem List Items Addressed This Visit        High    Class 3 drug-induced obesity with serious comorbidity and body mass index (BMI) of 40.0 to 44.9 in adult (H)     Dx:BMI 47.7 likely caused by chemotherapeutic agents used for breast cancer treatment. with multiple weight related comorbid conditions including breast cancer, prediabetes - starting to improve, hyperlipidemia (resolved with weight reduction), osteoarthritis, sleep apnea, and binge eating disorder.        Initial Weight: 265 lbs - bmi 47.7 4/26/2018  Weight: 256 lb (116.1 kg) bmi 46.08, 5/29/2018   Weight: 254 lb (115.2 kg) Body mass index is 45.72 kg/(m^2).  6/26/2018   Weight: 248 lb (112.5 kg) bmi 44.64, 7/31/2018   Weight: 248 lb (112.5 kg) bmi 44.64, 9/4/2018   Weight: 246 lb (111.6 kg) bmi 44.28, 11/1/2018   Weight: (!) 242 lb (109.8 kg) bmi 43.56, 12/5/2018   Weight: (!) 235 lb (106.6 kg), 42.3, 1/23/19  Weight: (!) 236 lb 4.8 oz (107.2 kg) Body mass index is 42.53 kg/m . , 2/28/2019   Weight: (!) 239 lb (108.4 kg), bmi 43.02, 4/16/2019   Weight loss from initial: 26  % Weight loss: 9.81 %        Medication notes:   4/30/18 - intake.  4/26/18 IR evidenced by a1c 5.9, fast blood sugar 126 and waist circumfrence 50 in.     METFORMIN 500 MG PO Q DAY - prediabetes.  4/26/2018 - 11/1/2018    - LOST 17 LBS  -  a1c worsening despite weight loss. 4/2018 a1c was 5.9 and weight was 265 lbs, 9/2018 a1c was 6.3 and weight was 248 lbs.   11/1/2018 - metformin increased   METFORMIN XR 1000 MG PO Q DAY - prediabetes (worsening on metformin 500 xr)  11/1/2018 - 4/16/2019   Risks benefits discussed and need to monitor gfr in the setting of gfr in 50s was discussed and informed consent was given.  Alternative of using saxenda also discussed.      WELLBUTRIN 150 MG 24 HR TAB DAILY  2/28/19  -never started because she read that it could interact with tamoxifen and she is still taking tamoxifen.     SAXENDA - recommended for weight loss  and increasing a1c despite weight loss  11/1/2018 - discussed. (would need to discontinue metformin).     Vyvanse?   Could consider since intake indicates she binge eats.     NAC - consider if needed for compulsive eating     CONTRAVE   I also thought about Contrave for her.  She was pretty emotional during her intake visit and tearful at times.  If it seems she has some depression ongoing during future visits she may benefit from Wellbutrin or Contrave.        pmh that could impact weight loss drug choices  Age: 68 y.o.   Contraception: postmenopausal  Glaucoma history - no  Do you have a history of osteopenia or osteoporosis? - yes.   Seizure history in self or family - yes  Hx of kidney stones - yes  History of pancreatitis - yes  History of bulemia -  no-   Bingeing - 0 times per week  compulsive or emotional eating  - yes  History of thyroid medullary cancer/ other endocrine cancer or fam hx of the same - breast cancer hormone sensitive.   How many oz of caffeinated beverages do you consume per day and what type? Maybe a cup of tea or cup of coffee once or twice a week.   How much alcohol do you drink per day? NONE  Do you have depression - YES  Do you have anxiety  - NO  Are you taking any antidepressants - NO  Do you get migraines - NONE  Do you smoke - FORMER SMOKER  Do you have night eating tendencies - YES  Have you ever had diabetes/ or prediabetes or metabolic syndrome -  YES  Do you have high blood pressure - NO  Do you drink a lot of pop  - NO  Do you have insomnia - NO  Do you have any chronic pain? YES  Do you have high cholesterol - IN THE PAST         PLAN  WEIGHT MAINTENANCE PHASE/ REGAINING  (BMI down from 47-42 - but now starting to REGAIN) hyperlipidemia resolved with weight reduction and a1c improving with weight reduction. If she is not losing weight / unhappy with weight loss - bariatric surgery would be indicated and that discussion is something I want to bring up when she is ready to  discuss it.     CAP plan   Less than or = to 241 - control  242-245 - action   Greater than or equal to 246 - panic - needs immediate follow up     Continue Metformin      Continue supplements.Fish oil (not taking consistently), vitamin D, multivitamin, 5 hydroxytryptophan, chromium    Barriers to losing weight:  Deals with arthritis pain, that makes it hard to be active. Sometimes binges.      Goal: Start meal planning and being more conscientious about eating.     Labs;  2019 normalized lipids noted (w 30 lb loss), normal b12, normal creatinine, impoved a1c from 6.3-6.2 (with 13 lb loss)  Plan repeat labs 2019.      FUTURE ORDERS: DEXA 2019.      Return in about 1 month for weight loss.                 Chief Complaint   Patient presents with     Weight Loss     last weight 236lbs         HPI  Nikki Michelle is a 68 y.o. female comes in for weight loss follow up.     She did not start wellbutrin because she is taking tamoxifen and she does not want to take the Wellbutrin and tamoxifen together as there are possible interactions.      Hunger control: she has been binge eating this month.  But she says she is back on track now  Exercise was discussed: has not exercised for the past three or four weeks, dealing with sinus infections.   Taking supplements: Fish oil (not taking consistently), vitamin D, multivitamin, 5 hydroxytryptophan, chromium  Discussed journaling food:  She does this intermittently.  Patient is pleased with the current results:  yes  The patient is following the nutrition plan:  She is starting back to healthy eating.   Barriers to losing weight:  Deals with arthritis pain, that makes it hard to be active.    Social History     Tobacco Use   Smoking Status Former Smoker     Packs/day: 0.50     Years: 5.00     Pack years: 2.50     Last attempt to quit: 1975     Years since quittin.5   Smokeless Tobacco Never Used      Current Outpatient Medications on File Prior to Visit  "  Medication Sig Dispense Refill     acetaminophen (TYLENOL) 325 MG tablet Take 650 mg by mouth every 6 (six) hours as needed for pain.       aspirin 81 MG EC tablet Take 81 mg by mouth daily.       b complex vitamins tablet Take 1 tablet by mouth daily.       buPROPion (WELLBUTRIN XL) 150 MG 24 hr tablet Take 1 tablet (150 mg total) by mouth every morning. 90 tablet 0     cholecalciferol, vitamin D3, 5,000 unit Tab Take 5,000 Units by mouth.       docoshexanoic acid-eicosapent 500 mg (FISH OIL) 500-100 mg cap capsule Take 500 mg by mouth daily.       EPINEPHrine (EPIPEN) 0.3 mg/0.3 mL (1:1,000) atIn Inject into the shoulder, thigh, or buttocks once. PRN for allergic reaction       fexofenadine (ALLEGRA) 180 MG tablet Take 180 mg by mouth daily.       levothyroxine (SYNTHROID, LEVOTHROID) 100 MCG tablet Take 1 tablet by mouth every morning before breakfast.       metFORMIN (GLUCOPHAGE-XR) 500 MG 24 hr tablet TAKE 2 TABLETS(1000 MG) BY MOUTH DAILY WITH BREAKFAST 180 tablet 0     naproxen (NAPROSYN) 500 MG tablet Take 1 tablet by mouth daily. As needed       omeprazole (PRILOSEC) 20 MG capsule TK 1 C PO QD AC  3     tamoxifen (NOLVADEX) 20 MG tablet TAKE 1 TABLET(20 MG) BY MOUTH DAILY 90 tablet 3     triamcinolone (KENALOG) 0.1 % ointment Apply 1 application topically 2 (two) times a day as needed.        vitamin E 400 UNIT capsule Take 400 Units by mouth daily.       No current facility-administered medications on file prior to visit.       Allergies   Allergen Reactions     Formaldehyde Analogues Rash     Penicillins Hives and Swelling     Throat swelling, has tolerated Keflex in past      Aleve [Naproxen Sodium] Rash     \"Liquid capsules only\"     Codeine Nausea And Vomiting     Preservative Rash     Tramadol Nausea And Vomiting       Review of Systems   Constitutional: Negative.    HENT: Negative.    Eyes: Negative.    Respiratory: Negative.    Cardiovascular: Negative.    Gastrointestinal: Negative.    Endocrine: " Negative.    Genitourinary: Negative.    Musculoskeletal: Negative.    Skin: Negative.    Neurological: Negative.    Hematological: Negative.    Psychiatric/Behavioral: Negative.         OBJECTIVE: /80 (Patient Site: Left Arm, Patient Position: Sitting, Cuff Size: Adult Large)   Pulse 100   Wt (!) 239 lb (108.4 kg)   Breastfeeding? No   BMI 43.02 kg/m     Physical Exam   Constitutional: She is oriented to person, place, and time. She appears well-developed and well-nourished. No distress.   HENT:   Head: Normocephalic and atraumatic.   Eyes: Conjunctivae are normal.   Neck: Neck supple.   Cardiovascular: Normal rate and regular rhythm.   Pulmonary/Chest: Effort normal.   Musculoskeletal: Normal range of motion.   Neurological: She is alert and oriented to person, place, and time.   Skin: Skin is warm and dry.   Psychiatric: She has a normal mood and affect.        This note was created using Dragon dictation.  Please excuse any grammatical errors.

## 2021-05-27 NOTE — PROGRESS NOTES
[facility] Hematology and Oncology Progress Note    Patient: Nikki Michelle  MRN: [id number]  Date of Service: 04/17/2019      Reason for Visit    Follow-up regarding breast cancer.    Assessment and Plan  Breast cancer of upper-outer quadrant of right female breast    Primary site: Breast (Right)    Staging method: AJCC 7th Edition    Pathologic: Stage IB (T1b, N1mi, cM0) signed by Laila Young MD on 11/13/2015 12:54 PM    Summary: Stage IB (T1b, N1mi, cM0)    Prognostic indicators:             ESTROGEN RECEPTOR: POSITIVE (95% OF CELLS) PROGESTERONE RECEPTOR: POSITIVE       (80% OF CELLS) HER2/ESTUARDO: NEGATIVE (STAINING PATTERN 0 OUT OF 3)       ECOG Performance   ECOG Performance Status: 0    Distress Assessment  Distress Assessment Score: No distress: No intervention is needed today.    Pain   : No pain now.    Ms. Nikki Michelle is a 68 y.o. 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  The carcinoma was 9 mm in size and there were micrometastases in the sentinel lymph node.  It was ER/OH positive and HER-2 negative.  Oncotype DX score was 10.  It was decided not to go for adjuvant chemotherapy.  She completed adjuvant radiation on 1/22/16 and started adjuvant Arimidex on 1/23/16.  She had a mammogram done on 9/6/16 where they felt that there is a mass at the 3 o'clock position in the left breast.  She had an ultrasound done the next day and these appeared to be benign cysts.  We will continue with yearly mammogram.  She had episodes of iritis with Reclast infusions.  So she decided to stop Reclast.  She changed from Arimidex to tamoxifen on 12/13/17 on account of the osteoporosis.    1.  She is complaining of some left hip pain.  Nothing really concerning was found on physical examination.  I suspect that some arthritis.  However we decided to go ahead and obtain x-rays of the left hip  and the left knee.  I have reviewed the images and discussed with the radiologist.  Appears to have some mild degenerative arthritis of the left hip and left knee.  I called and let the patient now.  I advised her that if the pain increases I would recommend a referral to orthopedics.  She is reluctant to go and see orthopedics right now.  She has promised to call us if things are not going well.    2.  At this time I am not seeing any evidence of breast cancer recurrence.  She will continue with the tamoxifen I daily.  She has now been on the estrogen therapy for about 3-1/2 years.  Our plan is to continue for at least 5 years.  LFTs have come back normal.  She will call when she needs a new prescription for tamoxifen.    3.  She had the follow-up DEXA scan done on 9/24/18 to follow-up on osteoporosis.  This showed some improvement in the bone density where it was called to be osteopenia.  I reminded her to continue with calcium and vitamin D tablets and to continue exercise by walking.  We will plan on repeating a DEXA scan in 2020.  I think switching from Arimidex to tamoxifen did help her.    4.  She will need another mammogram for annual follow-up done in September 2019.  This was ordered today and will be scheduled.    5.  Follow-up: Return to clinic with MD or NP in 6 months.    Time spend >25 minutes face to face with the patient. More than 50 % in counseling and coordination of care.      Problem List    1. Malignant neoplasm of upper-outer quadrant of right breast in female, estrogen receptor positive (H)  XR Hip Left 2 or More VWS    XR Knee Left 1 or 2 VWS    Mammo Screening Bilateral   2. Age-related osteoporosis without current pathological fracture  XR Hip Left 2 or More VWS    XR Knee Left 1 or 2 VWS   3. Hip pain, left  XR Hip Left 2 or More VWS    XR Knee Left 1 or 2 VWS   4. Encounter for screening mammogram for malignant neoplasm of breast   Mammo Screening Bilateral        CC: Urmila Meneses  M, MD     ______________________________________________________________________________    History of Present Illness    Ms. Nikki Michelle is here for follow-up alone.  Overall she feels that she is doing okay.  She is still trying to lose weight.  She is trying to remain physically and socially active also.    She says that in the last couple of weeks she has had some left hip pain and also some mild left knee pain.  She does not room or any trauma.  This was not a pain that she has had in the past.  She wonders whether she has some arthritis.    She says that she did have a sinus infection last week.  That is mostly clear.  This consisted mainly of some nasal congestion.  She also had some sinus pain over her face.  No fever.    She has been taking the tamoxifen without any problems.  She believes that she has refills left.    No fevers or night sweats.  No lumps or bumps anywhere.  No unusual headaches.  No eyesight problems.  Breathing is fine.  No chest pain or cough.  No nausea or vomiting or abdominal pain.  No constipation or diarrhea.  No blood in stool or black stools.  No skin rashes.  No vaginal bleeding.  No difficulty with urination.  No numbness or tingling.    Please see below.  A 14 point review of system is otherwise completely negative.      Pain Status  Currently in Pain: No/denies    Review of Systems    Constitutional  Constitutional (WDL): Exceptions to WDL  Fatigue: None  Fever: None  Chills: None  Weight Gain: None  Weight Loss: to <10% from baseline, intervention not indicated(8#)  Neurosensory  Neurosensory (WDL): Exceptions to WDL  Peripheral Motor Neuropathy: None  Ataxia: Asymptomatic, clinical or diagnostic observations only, intervention not indicated(Lt hip has been bad)  Peripheral Sensory Neuropathy: None  Confusion: None  Syncope: None  Cardiovascular  Cardiovascular (WDL): All cardiovascular elements are within defined limits  Pulmonary  Respiratory (WDL): Within Defined  Limits(chronic sinusitis)  Gastrointestinal  Gastrointestinal (WDL): All gastrointestinal elements are within defined limits  Genitourinary  Genitourinary (WDL): All genitourinary elements are within defined limits  Integumentary  Integumentary (WDL): All integumentary elements are within defined limits  Patient Coping  Patient Coping: Open/discussion  Distress Assessment  Distress Assessment Score: No distress  Accompanied by  Accompanied by: Alone    Past History  Past Medical History:   Diagnosis Date     Allergic rhinitis      Breast cancer (H)      Breast cyst     left breast for many years     Chronic sinus infection     has not had any recent recurrences.     Eczema      Environmental allergies     dust mites.     GERD (gastroesophageal reflux disease)      H/O acute pancreatitis 2005     Hx of radiation therapy 2015    right breast lumpectomy     Hypothyroidism      Infectious mononucleosis 1971     Migraine     premenstrual migraines. Not any more.     Nephrolithiasis      Obstructive sleep apnea     uses CPAP     Osteoarthritis          Past Surgical History:   Procedure Laterality Date     BREAST CYST ASPIRATION Left     benign many years ago     FINGER SURGERY Right     2nd right finger d/t laceration of tendon     LASIK Bilateral 2005     AK MASTECTOMY, PARTIAL Right 9/28/2015    Procedure: RIGHT LUMPECTOMY WIRE LOCALIZATION;  Surgeon: Raeann Wall MD;  Location: [facility];  Service: General     SENTINEL LYMPH NODE BIOPSY Right 9/28/2015           Physical Exam    Recent Vitals 4/17/2019   Height -   Weight 240 lbs 2 oz   BSA (m2) 2.19 m2   /70   Pulse 91   Temp 98   Temp src 1   SpO2 97   Some recent data might be hidden       GENERAL: Alert and oriented to time place and person. Seated comfortably. In no distress.  Large build.  Looks well.    HEAD: Atraumatic and normocephalic.      EYES: SHYAM, EOMI.  No pallor.  No icterus.    Oral cavity: no mucosal lesion or tonsillar  enlargement.    NECK: supple. JVP normal.  No thyroid enlargement.    LYMPH NODES: No palpable, cervical, axillary or inguinal lymphadenopathy.    CHEST: clear to auscultation bilaterally.  Resonant to percussion throughout bilaterally.  Symmetrical breath movements bilaterally.    CVS: S1 and S2 are heard. Regular rate and rhythm.  No murmur or gallop or rub heard.  No peripheral edema.    ABDOMEN: Soft. Not tender. Not distended.  No palpable hepatomegaly or splenomegaly.  No other mass palpable.  Bowel sounds heard.    EXTREMITIES: Warm.    No tenderness or swelling around the right knee or over the right hip.  Range of movement is normal at the right hip.  No tenderness or swelling around the left knee of the left hip.  Range of movement is also normal at the left hip.  She does not appear to have any tenderness on movement of the left hip.      SKIN: no rash, or bruising or purpura.  Has a full head of hair.    BREASTS:  Right breast: Skin looks normal.  The surgical scar is healed well.  There is no induration or mass palpable.  No tenderness.  The nipple looks normal.  The right axilla is without mass or nodule.    Left breast: Contour looks normal.  Skin looks normal.  There is no palpable mass.  The nipple looks normal.  No mass or nodule in the left axilla.    Lab Results    Recent Results (from the past 168 hour(s))   Hepatic Profile   Result Value Ref Range    Bilirubin, Total 0.3 0.0 - 1.0 mg/dL    Bilirubin, Direct 0.1 <=0.5 mg/dL    Protein, Total 6.6 6.0 - 8.0 g/dL    Albumin 3.3 (L) 3.5 - 5.0 g/dL    Alkaline Phosphatase 81 45 - 120 U/L    AST 21 0 - 40 U/L    ALT 17 0 - 45 U/L       Imaging    No results found.      Signed by: Laila Young MD

## 2021-05-28 NOTE — TELEPHONE ENCOUNTER
RN cannot approve Refill Request    RN can NOT refill this medication Protocol failed and NO refill given.     Merary Craft, [facility] Triage/Med Refill 4/26/2019    Requested Prescriptions   Pending Prescriptions Disp Refills     metFORMIN (GLUCOPHAGE-XR) 500 MG 24 hr tablet [Pharmacy Med Name: METFORMIN ER 500MG 24HR TABS] 180 tablet 0     Sig: TAKE 2 TABLETS(1000 MG) BY MOUTH DAILY WITH BREAKFAST       Metformin Refill Protocol Failed - 4/24/2019  1:39 PM        Failed - Microalbumin in last year      No results found for: MICROALBUR               Passed - Blood pressure in last 12 months     BP Readings from Last 1 Encounters:   04/17/19 132/70             Passed - LFT or AST or ALT in last 12 months     Albumin   Date Value Ref Range Status   04/17/2019 3.3 (L) 3.5 - 5.0 g/dL Final     Bilirubin, Total   Date Value Ref Range Status   04/17/2019 0.3 0.0 - 1.0 mg/dL Final     Bilirubin, Direct   Date Value Ref Range Status   04/17/2019 0.1 <=0.5 mg/dL Final     Alkaline Phosphatase   Date Value Ref Range Status   04/17/2019 81 45 - 120 U/L Final     AST   Date Value Ref Range Status   04/17/2019 21 0 - 40 U/L Final     ALT   Date Value Ref Range Status   04/17/2019 17 0 - 45 U/L Final     Protein, Total   Date Value Ref Range Status   04/17/2019 6.6 6.0 - 8.0 g/dL Final                Passed - GFR or Serum Creatinine in last 6 months     GFR MDRD Non Af Amer   Date Value Ref Range Status   01/23/2019 >60 >60 mL/min/1.73m2 Final     GFR MDRD Af Amer   Date Value Ref Range Status   01/23/2019 >60 >60 mL/min/1.73m2 Final             Passed - Visit with PCP or prescribing provider visit in last 6 months or next 3 months     Last office visit with prescriber/PCP: 4/16/2019 OR same dept: 4/16/2019 Kimmy Lopez MD OR same specialty: 4/16/2019 Kimmy Lopez MD Last physical: Visit date not found Last MTM visit: Visit date not found         Next appt within 3 mo: Visit date not found  Next physical  within 3 mo: Visit date not found  Prescriber OR PCP: Kimmy Lopez MD  Last diagnosis associated with med order: 1. Prediabetes  - metFORMIN (GLUCOPHAGE-XR) 500 MG 24 hr tablet [Pharmacy Med Name: METFORMIN ER 500MG 24HR TABS]; TAKE 2 TABLETS(1000 MG) BY MOUTH DAILY WITH BREAKFAST  Dispense: 180 tablet; Refill: 0     If protocol passes may refill for 12 months if within 3 months of last provider visit (or a total of 15 months).           Passed - A1C in last 6 months     Hemoglobin A1c   Date Value Ref Range Status   01/23/2019 6.2 (H) 3.5 - 6.0 % Final

## 2021-05-28 NOTE — TELEPHONE ENCOUNTER
Last appt: 04/16/2019  Next appt: 05/21/2019    Pt is a weight loss patient    Urmila Madrigal LPN

## 2021-05-29 NOTE — PATIENT INSTRUCTIONS - HE
See link below, scroll to bottom of the page.     [email or web address]

## 2021-05-29 NOTE — PROGRESS NOTES
ASSESSMENT AND PLAN:    We spent 37 minutes today in direct patient contact, 100% of the time in consultation concerning medical problems as listed below.     Problem List Items Addressed This Visit        High    Binge eating disorder (Chronic)     BINGE EATING/BOREDOM EATING/COMPULSIVE EATING  She says that she is doing a lot better with this because she distracts herself by working outside in her garden, and when she does that she finds that she does not feel hungry.         Malignant neoplasm of upper-outer quadrant of right breast in female, estrogen receptor positive (H)     HISTORY BREAST CANCER  She follows up with oncology every 6 months, gets an annual mammogram          Osteoporosis     OSTEOPOROSIS/OSTEOPENIA  She reports that her last DEXA scan that showed osteoporosis improving and is now osteopenia instead.  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 with multiple weight related comorbid conditions including breast cancer, prediabetes - starting to improve, hyperlipidemia (resolved with weight reduction), osteoarthritis, sleep apnea, and binge eating disorder.        Initial Weight: 265 lbs - bmi 47.7 4/26/2018  Weight: 256 lb (116.1 kg) bmi 46.08, 5/29/2018   Weight: 254 lb (115.2 kg) Body mass index is 45.72 kg/(m^2).  6/26/2018   Weight: 248 lb (112.5 kg) bmi 44.64, 7/31/2018   Weight: 248 lb (112.5 kg) bmi 44.64, 9/4/2018   Weight: 246 lb (111.6 kg) bmi 44.28, 11/1/2018   Weight: (!) 242 lb (109.8 kg) bmi 43.56, 12/5/2018   Weight: (!) 235 lb (106.6 kg), 42.3, 1/23/19  Weight: (!) 236 lb 4.8 oz (107.2 kg) Body mass index is 42.53 kg/m . , 2/28/2019   Weight: (!) 239 lb (108.4 kg), bmi 43.02, 4/16/2019   Weight: (!) 234 lb 12.8 oz (106.5 kg) bmi 42.26, 6/5/2019   Weight loss from initial: 30.2  % Weight loss: 11.4  %       Medication notes:   4/30/18 - intake.  4/26/18 IR evidenced by a1c 5.9, fast blood sugar 126 and waist circumfrence 50 in.     METFORMIN 500 MG PO Q DAY - prediabetes.  4/26/2018 - 11/1/2018    - LOST 17 LBS  -  a1c worsening despite weight loss. 4/2018 a1c was 5.9 and weight was 265 lbs, 9/2018 a1c was 6.3 and weight was 248 lbs.   11/1/2018 - metformin increased   11/1/2018 - - 6/5/2019  - she realized she never increased this medication. Although I had thought she was taking 1000 from 11/2018 - 6/5/19  METFORMIN XR 1000 MG PO Q DAY - prediabetes  6/5/2019 - start 1000 mg po q day.     WELLBUTRIN 150 MG 24 HR TAB DAILY  2/28/19  -never started because she read that it could interact with tamoxifen and she is still taking tamoxifen.     SAXENDA - recommended for weight loss and increasing a1c despite weight loss  11/1/2018 - discussed. (would need to discontinue metformin).     Vyvanse?   Could consider since intake indicates she binge eats.     NAC - consider if needed for compulsive eating     CONTRAVE   I also thought about Contrave for her.  She was pretty emotional during her intake visit and tearful at times.  If it seems she has some depression ongoing during future visits she may benefit from Wellbutrin or Contrave.        pmh that could impact weight loss drug choices  Age: 68 y.o.   Contraception: postmenopausal  Glaucoma history - no  Do you have a history of osteopenia or osteoporosis? - yes.   Seizure history in self or family - yes  Hx of kidney stones - yes  History of pancreatitis - yes  History of bulemia -  no-   Bingeing - 0 times per week  compulsive or emotional eating  - yes  History of thyroid medullary cancer/ other endocrine cancer or fam hx of the same - breast cancer hormone sensitive.   How many oz of caffeinated beverages do you consume per day and what type? Maybe a cup of tea or cup of coffee once or twice a week.   How much alcohol do you drink per day? NONE  Do you have  depression - YES  Do you have anxiety  - NO  Are you taking any antidepressants - NO  Do you get migraines - NONE  Do you smoke - FORMER SMOKER  Do you have night eating tendencies - YES  Have you ever had diabetes/ or prediabetes or metabolic syndrome -  YES  Do you have high blood pressure - NO  Do you drink a lot of pop  - NO  Do you have insomnia - NO  Do you have any chronic pain? YES  Do you have high cholesterol - IN THE PAST         PLAN  WEIGHT MAINTENANCE PHASE/ REGAINING  (BMI down from 47-42 - but now starting to REGAIN) hyperlipidemia resolved with weight reduction and a1c improving with weight reduction. If she is not losing weight / unhappy with weight loss - bariatric surgery would be indicated and that discussion is something I want to bring up when she is ready to discuss it.     CAP plan   Less than or = to 236- control  237-239 - action   Greater than or equal to 240 - panic - needs immediate follow up     Continue Metformin - she accidentally was only taking 500 mg per day but I thought that she was taking 1000 since 11/2018.  Due to recheck a1c and titrate med. Would recommend 1000 if over 5.7 or 500 if under 5.7     Continue supplements.Fish oil (not taking consistently), vitamin D, multivitamin, 5 hydroxytryptophan, chromium     Barriers to losing weight:  Deals with arthritis pain, that makes it hard to be active. Sometimes binges.      Goal: try and decrease portion sizes.     Labs;  1/2019 normalized lipids noted (w 30 lb loss), normal b12, normal creatinine, impoved a1c from 6.3-6.2 (with 13 lb loss)  Plan repeat labs 5/2019.      FUTURE ORDERS: DEXA 9/2019.     PREDIABETES  a1c hovering 6.3 - 6.2.  Recommend recheck a1c to rule out progression to diabetes. Order placed.    HISTORY BREAST CANCER  She follows up with oncology every 6 months, gets an annual mammogram     OSTEOPOROSIS/OSTEOPENIA  DEXA scan that showed osteoporosis improving and is now osteopenia instead.  DEXA due again  2019    SLEEP APNEA  She did have her CPAP adjusted 10/1/2018 but she has lost 12 pounds since then so I have recommended that she visit the sleep specialist again to get her CPAP adjusted again.    BINGE EATING/BOREDOM EATING/COMPULSIVE EATING  She says that she is doing a lot better with this because she distracts herself by working outside in her garden, and when she does that she finds that she does not feel hungry.  She wants to consider the 24-week intensive lifestyle plan in the fall when it may be more difficult for her to get outside and distract herself.     Return in about 1 month for weight loss.          Prediabetes - Primary     a1c hovering 6.3 - 6.2.  Recommend recheck a1c to rule out progression to diabetes. Order placed.         Relevant Orders    Glycosylated Hemoglobin A1c    Other sleep apnea     She did have her CPAP adjusted 10/1/2018 but she has lost 12 pounds since then so I have recommended that she visit the sleep specialist again to get her CPAP adjusted again.                Chief Complaint   Patient presents with     Weight Loss        HPI  Nikki Michelle is a 68 y.o. female comes in for weight loss follow up.    Update weight related medical problems - see a/p.   She says she is getting outside a lot more she is been gardening 3 to 5 hours a day she feels like this is really helpful because she does not feel hungry and does not find herself eating as much when she is outside distracted.  She has been noticing that she has trouble with portion size.  For example she was making a salad for herself and her  and they made way too much solid and possibly eat.  She wants to try to decrease her portion sizes.    Social History     Tobacco Use   Smoking Status Former Smoker     Packs/day: 0.50     Years: 5.00     Pack years: 2.50     Last attempt to quit: 1975     Years since quittin.7   Smokeless Tobacco Never Used      Current Outpatient Medications on File Prior to  "Visit   Medication Sig Dispense Refill     acetaminophen (TYLENOL) 325 MG tablet Take 650 mg by mouth every 6 (six) hours as needed for pain.       aspirin 81 MG EC tablet Take 81 mg by mouth daily.       b complex vitamins tablet Take 1 tablet by mouth daily.       buPROPion (WELLBUTRIN XL) 150 MG 24 hr tablet Take 1 tablet (150 mg total) by mouth every morning. 90 tablet 0     cholecalciferol, vitamin D3, 5,000 unit Tab Take 5,000 Units by mouth.       docoshexanoic acid-eicosapent 500 mg (FISH OIL) 500-100 mg cap capsule Take 500 mg by mouth daily.       EPINEPHrine (EPIPEN) 0.3 mg/0.3 mL (1:1,000) atIn Inject into the shoulder, thigh, or buttocks once. PRN for allergic reaction       fexofenadine (ALLEGRA) 180 MG tablet Take 180 mg by mouth daily.       levothyroxine (SYNTHROID, LEVOTHROID) 100 MCG tablet Take 1 tablet by mouth every morning before breakfast.       metFORMIN (GLUCOPHAGE-XR) 500 MG 24 hr tablet TAKE 2 TABLETS(1000 MG) BY MOUTH DAILY WITH BREAKFAST 180 tablet 3     naproxen (NAPROSYN) 500 MG tablet Take 1 tablet by mouth daily. As needed       omeprazole (PRILOSEC) 20 MG capsule TK 1 C PO QD AC  3     tamoxifen (NOLVADEX) 20 MG tablet TAKE 1 TABLET(20 MG) BY MOUTH DAILY 90 tablet 3     triamcinolone (KENALOG) 0.1 % ointment Apply 1 application topically 2 (two) times a day as needed.        vitamin E 400 UNIT capsule Take 400 Units by mouth daily.       No current facility-administered medications on file prior to visit.       Allergies   Allergen Reactions     Formaldehyde Analogues Rash     Penicillins Hives and Swelling     Throat swelling, has tolerated Keflex in past      Aleve [Naproxen Sodium] Rash     \"Liquid capsules only\"     Codeine Nausea And Vomiting     Preservative Rash     Tramadol Nausea And Vomiting         Review of Systems   Constitutional: Negative.    HENT: Negative.    Eyes: Negative.    Respiratory: Negative.    Cardiovascular: Negative.    Gastrointestinal: Negative.  " "  Endocrine: Negative.    Genitourinary: Negative.    Musculoskeletal: Negative.    Skin: Negative.    Neurological: Negative.    Hematological: Negative.    Psychiatric/Behavioral: Negative.         OBJECTIVE: /86 (Patient Site: Left Arm, Patient Position: Sitting, Cuff Size: Adult Large)   Pulse 68   Ht 5' 2.5\" (1.588 m)   Wt (!) 234 lb 12.8 oz (106.5 kg)   Breastfeeding? No   BMI 42.26 kg/m     Physical Exam   Constitutional: She is oriented to person, place, and time. She appears well-developed and well-nourished. No distress.   HENT:   Head: Normocephalic and atraumatic.   Eyes: Conjunctivae are normal.   Neck: Neck supple.   Cardiovascular: Normal rate and regular rhythm.   Pulmonary/Chest: Effort normal.   Musculoskeletal: Normal range of motion.   Neurological: She is alert and oriented to person, place, and time.   Skin: Skin is warm and dry.   Psychiatric: She has a normal mood and affect.     Orders Placed This Encounter   Procedures     Glycosylated Hemoglobin A1c         This note was created using Dragon dictation.  Please excuse any grammatical errors.    "

## 2021-05-30 VITALS — BODY MASS INDEX: 44.94 KG/M2 | WEIGHT: 245.7 LBS

## 2021-05-30 NOTE — PROGRESS NOTES
ASSESSMENT AND PLAN:     Problem List Items Addressed This Visit        High    Class 3 drug-induced obesity with serious comorbidity and body mass index (BMI) of 40.0 to 44.9 in adult (H)     Dx:BMI 47.7 likely caused by chemotherapeutic agents used for breast cancer treatment. with multiple weight related comorbid conditions including breast cancer, prediabetes - starting to improve, hyperlipidemia (resolved with weight reduction), osteoarthritis, sleep apnea, and binge eating disorder.        Initial Weight: 265 lbs - bmi 47.7 4/26/2018  Weight: 256 lb (116.1 kg) bmi 46.08, 5/29/2018   Weight: 254 lb (115.2 kg) Body mass index is 45.72 kg/(m^2).  6/26/2018   Weight: 248 lb (112.5 kg) bmi 44.64, 7/31/2018   Weight: 248 lb (112.5 kg) bmi 44.64, 9/4/2018   Weight: 246 lb (111.6 kg) bmi 44.28, 11/1/2018   Weight: (!) 242 lb (109.8 kg) bmi 43.56, 12/5/2018   Weight: (!) 235 lb (106.6 kg), 42.3, 1/23/19  Weight: (!) 236 lb 4.8 oz (107.2 kg) Body mass index is 42.53 kg/m . , 2/28/2019   Weight: (!) 239 lb (108.4 kg), bmi 43.02, 4/16/2019   Weight: (!) 234 lb 12.8 oz bmi 42.26, 6/5/2019   Weight: (!) 235 lb 12.8 oz (107 kg), bmi 42.44, 7/9/2019   Weight loss from initial: 29.2  % Weight loss: 11.02 %    Medication notes:   4/30/18 - intake.  4/26/18 IR evidenced by a1c 5.9, fast blood sugar 126 and waist circumfrence 50 in.     METFORMIN 500 MG PO Q DAY - prediabetes.  4/26/2018 - 11/1/2018    - LOST 17 LBS  -  a1c worsening despite weight loss. 4/2018 a1c was 5.9 and weight was 265 lbs, 9/2018 a1c was 6.3 and weight was 248 lbs.   11/1/2018 - metformin increased   11/1/2018 - - 6/5/2019  - she realized she never increased this medication. Although I had thought she was taking 1000 from 11/2018 - 6/5/19  METFORMIN XR 1000 MG PO Q DAY - prediabetes  6/5/2019 - 7/9/2019 1000 mg po q day.      WELLBUTRIN 150 MG 24 HR TAB DAILY  2/28/19  -never started because she read that it could interact with tamoxifen and she is still  taking tamoxifen.     SAXENDA - recommended for weight loss and increasing a1c despite weight loss  11/1/2018 & 7/9/2019  - discussed. (would need to discontinue metformin).     Vyvanse?   Could consider since intake indicates she binge eats.     NAC - consider if needed for compulsive eating     CONTRAVE   I also thought about Contrave for her.  She was pretty emotional during her intake visit and tearful at times.  If it seems she has some depression ongoing during future visits she may benefit from Wellbutrin or Contrave.        pmh that could impact weight loss drug choices  Age: 68 y.o.   Contraception: postmenopausal  Glaucoma history - no  Do you have a history of osteopenia or osteoporosis? - yes.   Seizure history in self or family - yes  Hx of kidney stones - yes  History of pancreatitis - yes  History of bulemia -  no-   Bingeing - 0 times per week  compulsive or emotional eating  - yes  History of thyroid medullary cancer/ other endocrine cancer or fam hx of the same - breast cancer hormone sensitive.   How many oz of caffeinated beverages do you consume per day and what type? Maybe a cup of tea or cup of coffee once or twice a week.   How much alcohol do you drink per day? NONE  Do you have depression - YES  Do you have anxiety  - NO  Are you taking any antidepressants - NO  Do you get migraines - NONE  Do you smoke - FORMER SMOKER  Do you have night eating tendencies - YES  Have you ever had diabetes/ or prediabetes or metabolic syndrome -  YES  Do you have high blood pressure - NO  Do you drink a lot of pop  - NO  Do you have insomnia - NO  Do you have any chronic pain? YES  Do you have high cholesterol - IN THE PAST         PLAN  WEIGHT MAINTENANCE PHASE/ REGAINING  (BMI down from 47-42 - but now starting to REGAIN) hyperlipidemia resolved with weight reduction and a1c improving with weight reduction. If she is not losing weight / unhappy with weight loss - bariatric surgery would be indicated and  that discussion is something I want to bring up when she is ready to discuss it.     CAP plan   Less than or = to 237- control  238-240 - action   Greater than or equal to 241 - panic - needs immediate follow up     Continue Metformin - she accidentally was only taking 500 mg per day but I thought that she was taking 1000 since 11/2018.  Due to recheck a1c and titrate med. Would recommend 1000 if over 5.7 or 500 if under 5.7     Continue supplements.Fish oil (not taking consistently), vitamin D, multivitamin, 5 hydroxytryptophan, chromium     Barriers to losing weight:  Deals with arthritis pain, that makes it hard to be active. Sometimes binges.      Goal: look at saxenda website and call insurance to consider cost of saxenda for possible future use.     Labs;  1/2019 normalized lipids noted (w 30 lb loss), normal b12, normal creatinine, impoved a1c from 6.3-6.2 (with 13 lb loss)  Plan repeat labs 5/2019.      FUTURE ORDERS: DEXA 9/2019.      PREDIABETES  a1c hovering 6.3 - 6.2.  Recommend recheck a1c to rule out progression to diabetes. Ordered again today.     HISTORY BREAST CANCER  She follows up with oncology every 6 months, gets an annual mammogram  -should be seen in the fall 2019 by oncology     OSTEOPOROSIS/OSTEOPENIA  DEXA scan that showed osteoporosis improving and is now osteopenia instead.  DEXA due again 9/2019 -points to get this through her primary care physician who is at [facility]     SLEEP APNEA  She says the CPAP is fitting her fine and it has adjustable straps so she does not feel it needs to be adjusted further.  She is wearing her CPAP machine regularly, and notes that she is been having a lot of trouble with her insurance in regards to payment of the CPAP machine.     BINGE EATING/BOREDOM EATING/COMPULSIVE EATING  She says that she is doing a lot better with this because she distracts herself by working outside in her garden, and when she does that she finds that she does not feel hungry.  She  wants to consider the 24-week intensive lifestyle plan in the fall when it may be more difficult for her to get outside and distract herself.     Return in about 1 month for weight loss.          Prediabetes - Primary    Relevant Orders    Glycosylated Hemoglobin A1c           Chief Complaint   Patient presents with     Weight Loss        HPI  Nikki Michelle is a 69 y.o. female comes in for weight loss follow up.    Tells me that she has not yet watched the weight loss video.  She is still wants to do this.    Has been trying to work in the yard and getting outside to watch her grandson who is 7, ballgaiva.  She really enjoys this.  She is also been waking up at 5:55 AM and getting on her treadmill.  She is also noticed that she is more motivated to put her knitting down and get outside and spend time with her grandkids.  Overall she feels like she is doing well with her health.  She is getting back to eating healthy.  She is been eating more vegetables lately.  She mentions that she had not been eating as many vegetables in the past but is getting back on track.    Social History     Tobacco Use   Smoking Status Former Smoker     Packs/day: 0.50     Years: 5.00     Pack years: 2.50     Last attempt to quit: 1975     Years since quittin.8   Smokeless Tobacco Never Used      Current Outpatient Medications on File Prior to Visit   Medication Sig Dispense Refill     acetaminophen (TYLENOL) 325 MG tablet Take 650 mg by mouth every 6 (six) hours as needed for pain.       aspirin 81 MG EC tablet Take 81 mg by mouth daily.       b complex vitamins tablet Take 1 tablet by mouth daily.       buPROPion (WELLBUTRIN XL) 150 MG 24 hr tablet Take 1 tablet (150 mg total) by mouth every morning. 90 tablet 0     cholecalciferol, vitamin D3, 5,000 unit Tab Take 5,000 Units by mouth.       docoshexanoic acid-eicosapent 500 mg (FISH OIL) 500-100 mg cap capsule Take 500 mg by mouth daily.       EPINEPHrine (EPIPEN) 0.3  "mg/0.3 mL (1:1,000) atIn Inject into the shoulder, thigh, or buttocks once. PRN for allergic reaction       fexofenadine (ALLEGRA) 180 MG tablet Take 180 mg by mouth daily.       levothyroxine (SYNTHROID, LEVOTHROID) 100 MCG tablet Take 1 tablet by mouth every morning before breakfast.       metFORMIN (GLUCOPHAGE-XR) 500 MG 24 hr tablet TAKE 2 TABLETS(1000 MG) BY MOUTH DAILY WITH BREAKFAST 180 tablet 3     naproxen (NAPROSYN) 500 MG tablet Take 1 tablet by mouth daily. As needed       omeprazole (PRILOSEC) 20 MG capsule TK 1 C PO QD AC  3     tamoxifen (NOLVADEX) 20 MG tablet TAKE 1 TABLET(20 MG) BY MOUTH DAILY 90 tablet 3     triamcinolone (KENALOG) 0.1 % ointment Apply 1 application topically 2 (two) times a day as needed.        vitamin E 400 UNIT capsule Take 400 Units by mouth daily.       No current facility-administered medications on file prior to visit.       Allergies   Allergen Reactions     Formaldehyde Analogues Rash     Penicillins Hives and Swelling     Throat swelling, has tolerated Keflex in past      Aleve [Naproxen Sodium] Rash     \"Liquid capsules only\"     Codeine Nausea And Vomiting     Preservative Rash     Tramadol Nausea And Vomiting         Review of Systems   Constitutional: Negative.    HENT: Negative.    Eyes: Negative.    Respiratory: Negative.    Cardiovascular: Negative.    Gastrointestinal: Negative.    Endocrine: Negative.    Genitourinary: Negative.    Musculoskeletal: Negative.    Skin: Negative.    Neurological: Negative.    Hematological: Negative.    Psychiatric/Behavioral: Negative.         OBJECTIVE: /78 (Patient Site: Left Arm, Patient Position: Sitting, Cuff Size: Adult Large)   Pulse 74   Ht 5' 2.5\" (1.588 m)   Wt (!) 235 lb 12.8 oz (107 kg)   SpO2 97%   BMI 42.44 kg/m     Physical Exam   Constitutional: She is oriented to person, place, and time. She appears well-developed and well-nourished. No distress.   HENT:   Head: Normocephalic and atraumatic.   Eyes: " Conjunctivae are normal.   Neck: Neck supple.   Cardiovascular: Normal rate and regular rhythm.   Pulmonary/Chest: Effort normal.   Musculoskeletal: Normal range of motion.   Neurological: She is alert and oriented to person, place, and time.   Skin: Skin is warm and dry.   Psychiatric: She has a normal mood and affect.        This note was created using Dragon dictation.  Please excuse any grammatical errors.

## 2021-05-31 VITALS — HEIGHT: 63 IN | WEIGHT: 265.5 LBS | BODY MASS INDEX: 47.04 KG/M2

## 2021-05-31 VITALS — HEIGHT: 62 IN | BODY MASS INDEX: 47.39 KG/M2 | WEIGHT: 257.5 LBS

## 2021-05-31 VITALS — BODY MASS INDEX: 47.61 KG/M2 | WEIGHT: 264.5 LBS

## 2021-06-01 VITALS — BODY MASS INDEX: 45 KG/M2 | WEIGHT: 254 LBS | HEIGHT: 63 IN

## 2021-06-01 VITALS — BODY MASS INDEX: 43.94 KG/M2 | HEIGHT: 63 IN | WEIGHT: 248 LBS

## 2021-06-01 VITALS — WEIGHT: 256 LBS | HEIGHT: 63 IN | BODY MASS INDEX: 45.36 KG/M2

## 2021-06-01 VITALS — WEIGHT: 267 LBS | HEIGHT: 63 IN | BODY MASS INDEX: 47.31 KG/M2

## 2021-06-01 VITALS — HEIGHT: 63 IN | BODY MASS INDEX: 43.94 KG/M2 | WEIGHT: 248 LBS

## 2021-06-01 VITALS — WEIGHT: 253.4 LBS | HEIGHT: 63 IN | BODY MASS INDEX: 44.9 KG/M2

## 2021-06-01 VITALS — BODY MASS INDEX: 46.95 KG/M2 | WEIGHT: 265 LBS | HEIGHT: 63 IN

## 2021-06-01 VITALS — BODY MASS INDEX: 48.06 KG/M2 | WEIGHT: 267 LBS

## 2021-06-01 NOTE — PROGRESS NOTES
ASSESSMENT AND PLAN:   Problem List Items Addressed This Visit        High    Binge eating disorder (Chronic)     She says that she is doing a lot better with this because she distracts herself by working outside in her garden, and when she does that she finds that she does not feel hungry.  She wants to consider the 24-week intensive lifestyle plan in the fall when it may be more difficult for her to get outside and distract herself.         Malignant neoplasm of upper-outer quadrant of right breast in female, estrogen receptor positive (H)     STABLE  She follows up with oncology every 6 months, gets an annual mammogram  -should be seen in the fall 2019 by oncology  1 year of tamoxifen left.         Class 3 drug-induced obesity with serious comorbidity and body mass index (BMI) of 40.0 to 44.9 in adult (H)     DIFFICULTY MAINTAINING WEIGHT LOSS - CONTINUES TO WORK ON THIS.   Dx:BMI 47.7 likely caused by chemotherapeutic agents used for breast cancer treatment. with multiple weight related comorbid conditions including breast cancer, prediabetes - starting to improve, hyperlipidemia (resolved with weight reduction), osteoarthritis, sleep apnea, and binge eating disorder.        Initial Weight: 265 lbs - bmi 47.7 4/26/2018  Weight: 256 lb (116.1 kg) bmi 46.08, 5/29/2018   Weight: 254 lb (115.2 kg) Body mass index is 45.72 kg/(m^2).  6/26/2018   Weight: 248 lb (112.5 kg) bmi 44.64, 7/31/2018   Weight: 248 lb (112.5 kg) bmi 44.64, 9/4/2018   Weight: 246 lb (111.6 kg) bmi 44.28, 11/1/2018   Weight: (!) 242 lb (109.8 kg) bmi 43.56, 12/5/2018   Weight: (!) 235 lb (106.6 kg), 42.3, 1/23/19  Weight: (!) 236 lb 4.8 oz (107.2 kg) Body mass index is 42.53 kg/m . , 2/28/2019   Weight: (!) 239 lb (108.4 kg), bmi 43.02, 4/16/2019   Weight: (!) 234 lb 12.8 oz bmi 42.26, 6/5/2019   Weight: (!) 235 lb 12.8 oz (107 kg), bmi 42.44, 7/9/2019   Weight: (!) 239 lb 8 oz , bmi 43.11, 9/24/2019   Weight loss from initial: 25.5  % Weight  loss: 9.62 %        Medication notes:   4/30/18 - intake.  4/26/18 IR evidenced by a1c 5.9, fast blood sugar 126 and waist circumfrence 50 in.     METFORMIN 500 MG PO Q DAY - prediabetes.  4/26/2018 - 11/1/2018    - LOST 17 LBS  -  a1c worsening despite weight loss. 4/2018 a1c was 5.9 and weight was 265 lbs, 9/2018 a1c was 6.3 and weight was 248 lbs.   11/1/2018 - metformin increased   11/1/2018 - - 6/5/2019  - she realized she never increased this medication. Although I had thought she was taking 1000 from 11/2018 - 6/5/19  METFORMIN XR 1000 MG PO Q DAY - prediabetes  6/5/2019 - 9/24/2019  1000 mg po q day.      WELLBUTRIN 150 MG 24 HR TAB DAILY  2/28/19  -never started because she read that it could interact with tamoxifen and she is still taking tamoxifen.     SAXENDA - recommended for weight loss and increasing a1c despite weight loss  11/1/2018 & 7/9/2019  - discussed. (would need to discontinue metformin).     Vyvanse?   Could consider since intake indicates she binge eats.     NAC - consider if needed for compulsive eating     CONTRAVE   I also thought about Contrave for her.  She was pretty emotional during her intake visit and tearful at times.  If it seems she has some depression ongoing during future visits she may benefit from Wellbutrin or Contrave.        pmh that could impact weight loss drug choices  Age: 68 y.o.   Contraception: postmenopausal  Glaucoma history - no  Do you have a history of osteopenia or osteoporosis? - yes.   Seizure history in self or family - yes  Hx of kidney stones - yes  History of pancreatitis - yes  History of bulemia -  no-   Bingeing - 0 times per week  compulsive or emotional eating  - yes  History of thyroid medullary cancer/ other endocrine cancer or fam hx of the same - breast cancer hormone sensitive.   How many oz of caffeinated beverages do you consume per day and what type? Maybe a cup of tea or cup of coffee once or twice a week.   How much alcohol do you drink  per day? NONE  Do you have depression - YES  Do you have anxiety  - NO  Are you taking any antidepressants - NO  Do you get migraines - NONE  Do you smoke - FORMER SMOKER  Do you have night eating tendencies - YES  Have you ever had diabetes/ or prediabetes or metabolic syndrome -  YES  Do you have high blood pressure - NO  Do you drink a lot of pop  - NO  Do you have insomnia - NO  Do you have any chronic pain? YES  Do you have high cholesterol - IN THE PAST         PLAN  WEIGHT MAINTENANCE PHASE/ REGAINING  (BMI down from 47-42 - but now starting to REGAIN to bmi 43)     hyperlipidemia resolved with weight reduction and a1c improving with weight reduction. If she is not losing weight / unhappy with weight loss - bariatric surgery would be indicated and that discussion is something I want to bring up when she is ready to discuss it, she is not ready now.     CAP plan   Less than or = to 241- control  243-245 - action   Greater than or equal to 246 - panic - needs immediate follow up     Continue Metformin 500 mg po q day - a1c down from 6.2 to 5.9 (could increase in future)     Continue supplements.Fish oil (not taking consistently), vitamin D, multivitamin, 5 hydroxytryptophan, chromium     Barriers to losing weight:  Deals with arthritis pain, that makes it hard to be active. Sometimes binges. Bread machine. Little exercise. Low motivation.     Goal: look at saxenda website and call insurance to consider cost of saxenda for possible future use.      Return in about 1 month for weight loss.         Prediabetes     IMPROVED  A1c is down from 6.2-5.9 as of 7/9/2019.  Continue metformin 500 mg p.o. daily plan to recheck A1c in October 2019.  Could increase metformin if A1c continues in the prediabetic range.      Continue weight loss efforts, see bmi in the problem list.          Other sleep apnea     IMPROVED  She says the CPAP is fitting her fine and it has adjustable straps so she does not feel it needs to be  adjusted further.  She is wearing her CPAP machine regularly, and notes that she is been having a lot of trouble with her insurance in regards to payment of the CPAP machine.    Weight reduction recommended.                 Chief Complaint   Patient presents with     Weight Loss        HPI  Nikki Michelle is a 69 y.o. female tells me she wants to get back on track and wants to be inspired.  She likes to follow-up with her and her  has been asking her to go for walks.  She reflects that her motivation has been down since her breast surgery.    However more recently she feels more energy normalization.  For example she drove to [place] to see her family on Thursday and on Friday she went to see a baseball game.  She says she has 1 year left of taking tamoxifen.  But now she feels stronger and more able to get up and do things.  She recently got a bread thinking machine which has been found to use but she knows that the bread is not good for her weight.  Bread may be part of the reason she has gained.    Social History     Tobacco Use   Smoking Status Former Smoker     Packs/day: 0.50     Years: 5.00     Pack years: 2.50     Last attempt to quit: 1975     Years since quittin.0   Smokeless Tobacco Never Used      Current Outpatient Medications on File Prior to Visit   Medication Sig Dispense Refill     acetaminophen (TYLENOL) 325 MG tablet Take 650 mg by mouth every 6 (six) hours as needed for pain.       aspirin 81 MG EC tablet Take 81 mg by mouth daily.       b complex vitamins tablet Take 1 tablet by mouth daily.       buPROPion (WELLBUTRIN XL) 150 MG 24 hr tablet Take 1 tablet (150 mg total) by mouth every morning. 90 tablet 0     cholecalciferol, vitamin D3, 5,000 unit Tab Take 5,000 Units by mouth.       docoshexanoic acid-eicosapent 500 mg (FISH OIL) 500-100 mg cap capsule Take 500 mg by mouth daily.       EPINEPHrine (EPIPEN) 0.3 mg/0.3 mL (1:1,000) atIn Inject into the shoulder, thigh, or  "buttocks once. PRN for allergic reaction       fexofenadine (ALLEGRA) 180 MG tablet Take 180 mg by mouth daily.       levothyroxine (SYNTHROID, LEVOTHROID) 100 MCG tablet Take 1 tablet by mouth every morning before breakfast.       metFORMIN (GLUCOPHAGE-XR) 500 MG 24 hr tablet TAKE 2 TABLETS(1000 MG) BY MOUTH DAILY WITH BREAKFAST 180 tablet 3     naproxen (NAPROSYN) 500 MG tablet Take 1 tablet by mouth daily. As needed       omeprazole (PRILOSEC) 20 MG capsule TK 1 C PO QD AC  3     tamoxifen (NOLVADEX) 20 MG tablet TAKE 1 TABLET(20 MG) BY MOUTH DAILY 90 tablet 3     triamcinolone (KENALOG) 0.1 % ointment Apply 1 application topically 2 (two) times a day as needed.        vitamin E 400 UNIT capsule Take 400 Units by mouth daily.       No current facility-administered medications on file prior to visit.       Allergies   Allergen Reactions     Formaldehyde Analogues Rash     Penicillins Hives and Swelling     Throat swelling, has tolerated Keflex in past      Aleve [Naproxen Sodium] Rash     \"Liquid capsules only\"     Codeine Nausea And Vomiting     Preservative Rash     Tramadol Nausea And Vomiting         Review of Systems   Constitutional: Negative.    HENT: Negative.    Eyes: Negative.    Respiratory: Negative.    Cardiovascular: Negative.    Gastrointestinal: Negative.    Endocrine: Negative.    Genitourinary: Negative.    Musculoskeletal: Negative.    Skin: Negative.    Neurological: Negative.    Hematological: Negative.    Psychiatric/Behavioral: Negative.         OBJECTIVE: /72 (Patient Site: Left Arm, Patient Position: Sitting, Cuff Size: Adult Large)   Pulse 74   Ht 5' 2.5\" (1.588 m)   Wt (!) 239 lb 8 oz (108.6 kg)   BMI 43.11 kg/m     Physical Exam  Constitutional:       General: She is not in acute distress.     Appearance: She is well-developed.   HENT:      Head: Normocephalic and atraumatic.   Eyes:      Conjunctiva/sclera: Conjunctivae normal.   Neck:      Musculoskeletal: Neck supple. "   Cardiovascular:      Rate and Rhythm: Normal rate and regular rhythm.   Pulmonary:      Effort: Pulmonary effort is normal.   Musculoskeletal: Normal range of motion.   Skin:     General: Skin is warm and dry.   Neurological:      Mental Status: She is alert and oriented to person, place, and time.          Additional History from Old Records Summarized (2): yes  Decision to Obtain Records (1): no  Radiology Tests Summarized or Ordered (1): yes  Labs Reviewed or Ordered (1): yes  Medicine Test Summarized or Ordered (1): no  Independent Review of EKG or X-RAY(2 each): no    This note was created using Dragon dictation.  Please excuse any grammatical errors.

## 2021-06-02 VITALS — WEIGHT: 239 LBS | BODY MASS INDEX: 43.02 KG/M2

## 2021-06-02 VITALS — BODY MASS INDEX: 43.22 KG/M2 | WEIGHT: 240.1 LBS

## 2021-06-02 VITALS — WEIGHT: 246 LBS | HEIGHT: 63 IN | BODY MASS INDEX: 43.59 KG/M2

## 2021-06-02 VITALS — WEIGHT: 236.3 LBS | HEIGHT: 63 IN | BODY MASS INDEX: 41.87 KG/M2

## 2021-06-02 VITALS — BODY MASS INDEX: 41.64 KG/M2 | HEIGHT: 63 IN | WEIGHT: 235 LBS

## 2021-06-02 VITALS — HEIGHT: 63 IN | WEIGHT: 249.1 LBS | BODY MASS INDEX: 44.14 KG/M2

## 2021-06-02 VITALS — HEIGHT: 63 IN | WEIGHT: 242 LBS | BODY MASS INDEX: 42.88 KG/M2

## 2021-06-02 NOTE — PROGRESS NOTES
ASSESSMENT AND PLAN:      Problem List Items Addressed This Visit        High    Malignant neoplasm of upper-outer quadrant of right breast in female, estrogen receptor positive (H)     CONTINUED WEIGHT REDUCTION RECOMMENDED.         Osteoporosis     WORSENING  Patient very disappointed, she thought her exercise efforts would have helped, but she now realizes that the 10 min of walking she can do before she needs to rest is not enough to build bone. We discussed that weight loss may help her be more active and then she could potentially do more weight bearing exercise to maintain the bone strength that she still has.     Weight loss surgery discussed.          Class 3 drug-induced obesity with serious comorbidity and body mass index (BMI) of 40.0 to 44.9 in adult (H)     STABLE/ WORSENING    DIFFICULTY MAINTAINING WEIGHT LOSS - CONTINUES TO WORK ON THIS.   Dx:BMI 47.7 likely caused by chemotherapeutic agents used for breast cancer treatment. with multiple weight related comorbid conditions including breast cancer, prediabetes - starting to improve, hyperlipidemia (resolved with weight reduction), osteoarthritis, sleep apnea, and binge eating disorder.        Initial Weight: 265 lbs - bmi 47.7 4/26/2018  Weight: 256 lb (116.1 kg) bmi 46.08, 5/29/2018   Weight: 254 lb (115.2 kg) Body mass index is 45.72 kg/(m^2).  6/26/2018   Weight: 248 lb (112.5 kg) bmi 44.64, 7/31/2018   Weight: 248 lb (112.5 kg) bmi 44.64, 9/4/2018   Weight: 246 lb (111.6 kg) bmi 44.28, 11/1/2018   Weight: (!) 242 lb (109.8 kg) bmi 43.56, 12/5/2018   Weight: (!) 235 lb (106.6 kg), 42.3, 1/23/19  Weight: (!) 236 lb 4.8 oz (107.2 kg) Body mass index is 42.53 kg/m . , 2/28/2019   Weight: (!) 239 lb (108.4 kg), bmi 43.02, 4/16/2019   Weight: (!) 234 lb 12.8 oz bmi 42.26, 6/5/2019   Weight: (!) 235 lb 12.8 oz (107 kg), bmi 42.44, 7/9/2019   Weight: (!) 239 lb 8 oz , bmi 43.11, 9/24/2019   Weight: (!) 239 lb (108.4 kg), BMI 43, 10/23/2019   Weight loss  from initial: 27  % Weight loss: 10.19 %      Medication notes:   4/30/18 - intake.  4/26/18 IR evidenced by a1c 5.9, fast blood sugar 126 and waist circumfrence 50 in.     METFORMIN  - prediabetes.  4/26/2018 -  6/5/19 500 MG PO Q DAY  METFORMIN XR 1000 MG PO Q DAY - prediabetes  6/5/2019 - 9/24/2019  1000 mg po q day.      WELLBUTRIN 150 MG 24 HR TAB DAILY  2/28/19  -never started because she read that it could interact with tamoxifen and she is still taking tamoxifen.     SAXENDA - recommended for weight loss and increasing a1c despite weight loss  11/1/2018 & 7/9/2019  - discussed. (would need to discontinue metformin).     Vyvanse?   Could consider since intake indicates she binge eats.     NAC - consider if needed for compulsive eating     CONTRAVE   I also thought about Contrave for her.  She was pretty emotional during her intake visit and tearful at times.  If it seems she has some depression ongoing during future visits she may benefit from Wellbutrin or Contrave.        pmh that could impact weight loss drug choices  Age: 68 y.o.   Contraception: postmenopausal  Glaucoma history - no  Do you have a history of osteopenia or osteoporosis? - yes.   Seizure history in self or family - yes  Hx of kidney stones - yes  History of pancreatitis - yes  History of bulemia -  no-   Bingeing - 0 times per week  compulsive or emotional eating  - yes  History of thyroid medullary cancer/ other endocrine cancer or fam hx of the same - breast cancer hormone sensitive.   How many oz of caffeinated beverages do you consume per day and what type? Maybe a cup of tea or cup of coffee once or twice a week.   How much alcohol do you drink per day? NONE  Do you have depression - YES  Do you have anxiety  - NO  Are you taking any antidepressants - NO  Do you get migraines - NONE  Do you smoke - FORMER SMOKER  Do you have night eating tendencies - YES  Have you ever had diabetes/ or prediabetes or metabolic syndrome -  YES  Do you  have high blood pressure - NO  Do you drink a lot of pop  - NO  Do you have insomnia - NO  Do you have any chronic pain? YES  Do you have high cholesterol - IN THE PAST         PLAN  WEIGHT MAINTENANCE PHASE/ REGAINING  (BMI down from 47-42 - but now starting to REGAIN to bmi 43)     CAP plan   Less than or = to 241- control  243-245 - action   Greater than or equal to 246 - panic - needs immediate follow up     Continue Metformin 1000 mg po q day - a1c down from 6.2 to 5.9 (could increase in future)     Continue supplements.Fish oil (not taking consistently), vitamin D, multivitamin, 5 hydroxytryptophan, chromium     Patient frustrated with health and inability to exercise due to pain. We discussed weight loss surgery and she is beginning to contemplate this so a video on weight loss surgery was given and she will take a look     Return in about 1 month for weight loss and to discuss weight loss surgery video if she desires she will also discuss health  with her  (24 wk and 3 pack)         Prediabetes     IMPROVED  a1c down from 6.2 to 5.9 between January and July ( weight was stable at 235 throughout that time)- she has actually not lost weight in that time but has been working on her lifestyle and diet and this is clearly having a positive impact on her health.     Continued weight loss efforts recommended. Plan to recheck a1c at follow up            Unprioritized    Mixed hyperlipidemia     CONTROLLED WITH LIFESTYLE MODIFICATIONS AND WEIGHT LOSS.   Continue weight loss efforts.                 Chief Complaint   Patient presents with     Weight Loss        HPI  Nikik Michelle is a 69 y.o. female comes in frustrated with breast cancer, and inability to exercise. Her body pains really limit her ability to move. She needs to rest after 10 min of walking. She is not happy with her weight loss at this time, really struggling to become healthier.     Social History     Tobacco Use   Smoking Status Former  "Smoker     Packs/day: 0.50     Years: 5.00     Pack years: 2.50     Last attempt to quit: 1975     Years since quittin.1   Smokeless Tobacco Never Used      Current Outpatient Medications on File Prior to Visit   Medication Sig Dispense Refill     acetaminophen (TYLENOL) 325 MG tablet Take 650 mg by mouth every 6 (six) hours as needed for pain.       aspirin 81 MG EC tablet Take 81 mg by mouth daily.       b complex vitamins tablet Take 1 tablet by mouth daily.       buPROPion (WELLBUTRIN XL) 150 MG 24 hr tablet Take 1 tablet (150 mg total) by mouth every morning. 90 tablet 0     cholecalciferol, vitamin D3, 5,000 unit Tab Take 5,000 Units by mouth.       docoshexanoic acid-eicosapent 500 mg (FISH OIL) 500-100 mg cap capsule Take 500 mg by mouth daily.       EPINEPHrine (EPIPEN) 0.3 mg/0.3 mL (1:1,000) atIn Inject into the shoulder, thigh, or buttocks once. PRN for allergic reaction       fexofenadine (ALLEGRA) 180 MG tablet Take 180 mg by mouth daily.       levothyroxine (SYNTHROID, LEVOTHROID) 100 MCG tablet Take 1 tablet by mouth every morning before breakfast.       metFORMIN (GLUCOPHAGE-XR) 500 MG 24 hr tablet TAKE 2 TABLETS(1000 MG) BY MOUTH DAILY WITH BREAKFAST 180 tablet 3     naproxen (NAPROSYN) 500 MG tablet Take 1 tablet by mouth daily. As needed       omeprazole (PRILOSEC) 20 MG capsule TK 1 C PO QD AC  3     tamoxifen (NOLVADEX) 20 MG tablet TAKE 1 TABLET(20 MG) BY MOUTH DAILY 90 tablet 3     triamcinolone (KENALOG) 0.1 % ointment Apply 1 application topically 2 (two) times a day as needed.        vitamin E 400 UNIT capsule Take 400 Units by mouth daily.       calcium-magnesium 300-300 mg Tab Take by mouth.       No current facility-administered medications on file prior to visit.       Allergies   Allergen Reactions     Formaldehyde Analogues Rash     Penicillins Hives and Swelling     Throat swelling, has tolerated Keflex in past      Aleve [Naproxen Sodium] Rash     \"Liquid capsules only\" " "    Codeine Nausea And Vomiting     Preservative Rash     Tramadol Nausea And Vomiting         Review of Systems   Constitutional: Negative.    HENT: Negative.    Eyes: Negative.    Respiratory: Negative.    Cardiovascular: Negative.    Gastrointestinal: Negative.    Endocrine: Negative.    Genitourinary: Negative.    Musculoskeletal: Negative.    Skin: Negative.    Neurological: Negative.    Hematological: Negative.    Psychiatric/Behavioral: Negative.         OBJECTIVE: /82   Pulse 68   Resp 14   Ht 5' 2.5\" (1.588 m)   Wt (!) 238 lb (108 kg)   BMI 42.84 kg/m     Physical Exam  Constitutional:       General: She is not in acute distress.     Appearance: She is well-developed.   HENT:      Head: Normocephalic and atraumatic.   Eyes:      Conjunctiva/sclera: Conjunctivae normal.   Neck:      Musculoskeletal: Neck supple.   Cardiovascular:      Rate and Rhythm: Normal rate and regular rhythm.   Pulmonary:      Effort: Pulmonary effort is normal.   Musculoskeletal: Normal range of motion.   Skin:     General: Skin is warm and dry.   Neurological:      Mental Status: She is alert and oriented to person, place, and time.          Additional History from Old Records Summarized (2): yes  Decision to Obtain Records (1): no  Radiology Tests Summarized or Ordered (1): no  Labs Reviewed or Ordered (1): yes  Medicine Test Summarized or Ordered (1): no  Independent Review of EKG or X-RAY(2 each): no    This note was created using Dragon dictation.  Please excuse any grammatical errors.   "

## 2021-06-02 NOTE — PROGRESS NOTES
Patient is here for six month follow-up of breast cancer. Had Mammo and DEXA scan done.  Becca Lewis RN

## 2021-06-02 NOTE — PROGRESS NOTES
[facility] Hematology and Oncology Progress Note    Patient: Nikki Michelle  MRN: [id number]  Date of Service: 10/23/2019      Reason for Visit    Follow-up regarding breast cancer.    Assessment and Plan  Breast cancer of upper-outer quadrant of right female breast    Primary site: Breast (Right)    Staging method: AJCC 7th Edition    Pathologic: Stage IB (T1b, N1mi, cM0) signed by Laila Young MD on 11/13/2015 12:54 PM    Summary: Stage IB (T1b, N1mi, cM0)    Prognostic indicators:             ESTROGEN RECEPTOR: POSITIVE (95% OF CELLS) PROGESTERONE RECEPTOR: POSITIVE       (80% OF CELLS) HER2/ESTUARDO: NEGATIVE (STAINING PATTERN 0 OUT OF 3)       ECOG Performance   ECOG Performance Status: 0    Distress Assessment  Distress Assessment Score: No distress: No intervention is needed today.    Pain   : No pain now.    Ms. Nikki Michelle is a 69 y.o. 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    The carcinoma was 9 mm in size and there were micrometastases in the sentinel lymph node.  It was ER/WA positive and HER-2 negative.  Oncotype DX score was 10.    It was decided not to go for adjuvant chemotherapy.  She completed adjuvant radiation on 1/22/16 and started adjuvant Arimidex on 1/23/16.    She has osteoporosis.  She had episodes of iritis with Reclast infusions.  So she decided to stop Reclast.  She changed from Arimidex to tamoxifen on 12/13/17 on account of the osteoporosis.    1.  From the breast cancer point of view, she appears to be doing well.  Nothing concerning for breast cancer recurrence found on physical examination or symptoms.  She had a follow-up mammogram done on 9/23/2019.  Results are reviewed with her.  It did not show any evidence of recurrence of the breast cancer.  I advised her that she should continue with the tamoxifen.  She has now completed 4 years of tamoxifen.   We will plan to continue tamoxifen for 1 more year and then I think she can stop it.  She was agreeable to the plan.  She will be coming back for follow-up in 6 months time.  She will need another mammogram done in September 2020.  After that she will need to have an annual mammogram.    2.  Her main concern is about the osteoporosis.  In the DEXA scan that she had a done in the [facility] system on 9/25/2019, she had some worsening of the osteoporosis with worsening bone density in the lumbar spine area.  So far she has not had any problems like a fracture.  We do not want that to happen.  I think we have to consider this to be age-related and because she is postmenopausal.  She has been taking calcium and vitamin D regularly.  She has been exercising.  We changed her over from Arimidex to tamoxifen in December 2017.  The progression of osteoporosis is happened in spite of that.    I discussed with her about our treatment options going forward.  One option would be for her to take oral Fosamax.  Another option would be for her to take Prolia.  I think it these will likely not have the problem of iritis that she had with Reclast.  We discussed about the other side effects that can happen including osteonecrosis of the jaw and kidney problems.  I discussed with her about the risk of esophagitis with Fosamax and how that is handled.  I have given her printed information about Fosamax and about Prolia to read from the [employer] database.  She will go home and read about them and also discussed with her .  I am referring her to the osteoporosis care, I think she will receive more expert advice from them.    3.  She has already received the seasonal flu vaccine.    4.  Follow-up: Return to clinic with MD or NP in 6 months.    Time spend >25 minutes face to face with the patient. More than 50 % in counseling and coordination of care.        Problem List    1. Malignant neoplasm of upper-outer quadrant of right breast in  female, estrogen receptor positive (H)     2. Age-related osteoporosis without current pathological fracture  Ambulatory referral to Osteoporosis Care   3. Use of tamoxifen (Nolvadex)          CC: Urmila Meneses MD     ______________________________________________________________________________    History of Present Illness    Ms. Nikki Michelle is here for follow-up alone.    She has been feeling well overall.  She has been working hard to lose weight by watching her diet and exercising.  She has not noticed any lumps or bumps anywhere.  No aches or pains anywhere.  No recent trauma.    She is mainly concerned about the findings in the recent DEXA scan that she had in the [facility] system on 9/25/2019.  It shows that her bone density has worsened by about 6% in the lumbar spine putting her in the range for osteoporosis.  She is concerned about this.  She has been taking the calcium and vitamin D tablets and she has been taking the tamoxifen regularly.  She wonders what more can be done about this.  She really does not want to take Reclast again since several years ago after she took Reclast she had iritis which is thought to be a side effect of the medication.    No fevers.  No night sweats.  No lumps or bumps anywhere.  No unusual headaches.  No eyesight problems.  No mouth sores.  No swallowing difficulty.  No shortness of breath.  No chest pain or cough.  No nausea or vomiting or abdominal pain.  No constipation or diarrhea.  No blood in stool or black stools.  No vaginal bleeding.  No difficulty with urination.  No skin rashes.  No numbness or tingling.    Please see below.  A 14 point review of system is otherwise completely negative.      Pain Status  Currently in Pain: No/denies    Review of Systems    Constitutional  Constitutional (WDL): Exceptions to WDL  Fatigue: None  Fever: None  Chills: None  Weight Gain: None  Weight Loss: None  Neurosensory  Neurosensory (WDL): All neurosensory elements are  within defined limits  Cardiovascular  Cardiovascular (WDL): All cardiovascular elements are within defined limits  Pulmonary  Respiratory (WDL): Within Defined Limits  Gastrointestinal  Gastrointestinal (WDL): All gastrointestinal elements are within defined limits  Genitourinary  Genitourinary (WDL): All genitourinary elements are within defined limits  Integumentary  Integumentary (WDL): All integumentary elements are within defined limits  Patient Coping     Distress Assessment  Distress Assessment Score: No distress  Accompanied by  Accompanied by: Alone    Past History  Past Medical History:   Diagnosis Date     Allergic rhinitis      Breast cancer (H) 2015    right breast     Breast cyst     left breast for many years     Chronic sinus infection     has not had any recent recurrences.     Eczema      Environmental allergies     dust mites.     GERD (gastroesophageal reflux disease)      H/O acute pancreatitis 2005     Hx of radiation therapy 2015    right breast lumpectomy     Hypothyroidism      Infectious mononucleosis 1971     Migraine     premenstrual migraines. Not any more.     Nephrolithiasis      Obstructive sleep apnea     uses CPAP     Osteoarthritis          Past Surgical History:   Procedure Laterality Date     BREAST CYST ASPIRATION Left     benign many years ago     FINGER SURGERY Right     2nd right finger d/t laceration of tendon     LASIK Bilateral 2005     MT MASTECTOMY, PARTIAL Right 9/28/2015    Procedure: RIGHT LUMPECTOMY WIRE LOCALIZATION;  Surgeon: Raeann Wall MD;  [place] [facility] [place]: General     SENTINEL LYMPH NODE BIOPSY Right 9/28/2015           Physical Exam    Recent Vitals 10/23/2019   Height -   Weight 239 lbs   BSA (m2) 2.19 m2   /70   Pulse 91   Temp 98.1   Temp src 1   SpO2 97   Some recent data might be hidden       GENERAL: Alert and oriented to time place and person. Seated comfortably. In no distress.  Heavily built.  She looks well  overall.    HEAD: Atraumatic and normocephalic.    EYES: SHYAM, EOMI.  No pallor.  No icterus.    Oral cavity: no mucosal lesion or tonsillar enlargement.    NECK: supple. JVP normal.  No thyroid enlargement.    LYMPH NODES: No palpable, cervical, axillary or inguinal lymphadenopathy.    CHEST: clear to auscultation bilaterally.  Resonant to percussion throughout bilaterally.  Symmetrical breath movements bilaterally.    CVS: S1 and S2 are heard. Regular rate and rhythm.  No murmur or gallop or rub heard.  No peripheral edema.    ABDOMEN: Soft. Not tender. Not distended.  No palpable hepatomegaly or splenomegaly.  No other mass palpable.  Bowel sounds heard.    EXTREMITIES: Warm.    SKIN: no rash, or bruising or purpura.  Has a full head of hair.    BREASTS:  Right breast: Skin looks normal.  The old surgical scar is healed well.  There is no underlying induration.  No mass palpable in the right breast. The nipple looks normal.  The right axilla is without mass or nodule.    Left breast: Contour looks normal.  Skin looks normal.  There is no palpable mass.  The nipple looks normal.  No mass or nodule in the left axilla.      Lab Results    No results found for this or any previous visit (from the past 168 hour(s)).    Imaging  EXAM: BONE MINERAL DENSITY EXAM  LOCATION: [facility]  DATE/TIME: 9/25/2019 2:59 PM     INDICATION: C. Low bone density-osteopenia (must be documented by previous scan)  - m85.80 Menopause.  COMPARISON: 9/24/2018.    FINDINGS:  Lumbar Spine L1-L2 BMD: 0.870 g/cm sq; T-score -2.5; Z-score -0.8  WHO classification: Osteoporosis    Mean Bilateral Total Hip BMD: 0.901 g/cm sq; T-score -0.8; Z-score 0.6  WHO classification: Normal    Mean Bilateral Femoral Neck BMD: 0.794 g/cm sq; T-score -1.8; Z-score -0.1  WHO classification: Osteopenia    WHO criteria:  Normal: T score at or above -1 SD  Osteopenia: T score between -1 and -2.5 SD  Osteoporosis: T score at or below -2.5  SD    CONCLUSION:  1.  Osteoporosis lumbar spine. There is been a 6% measured decrease as compared  to last years examination.  2.  Osteopenia right and left femoral neck. No significant change in the  proximal right femur from previous.    BILATERAL FULL FIELD DIGITAL SCREENING MAMMOGRAM     Performed on: 9/23/19     Compared to: 09/20/2018 Mammo Screening Bilateral, 09/18/2017 Mammo Screening Bilateral, and 09/06/2016 Mammo Diagnostic Bilateral     Findings: The breasts are almost entirely fatty. There are postsurgical lumpectomy changes in the right breast. No evidence for malignancy and no change from the previous exam(s). Continue routine screening mammogram as recommended.     ACR BI-RADS Category 2: Benign Findings    No results found.      Signed by: Laila Young MD

## 2021-06-03 VITALS
HEART RATE: 74 BPM | BODY MASS INDEX: 42.44 KG/M2 | HEIGHT: 63 IN | WEIGHT: 239.5 LBS | DIASTOLIC BLOOD PRESSURE: 72 MMHG | SYSTOLIC BLOOD PRESSURE: 120 MMHG

## 2021-06-03 VITALS
TEMPERATURE: 98.1 F | BODY MASS INDEX: 43.02 KG/M2 | HEART RATE: 91 BPM | OXYGEN SATURATION: 97 % | DIASTOLIC BLOOD PRESSURE: 70 MMHG | SYSTOLIC BLOOD PRESSURE: 143 MMHG | WEIGHT: 239 LBS

## 2021-06-03 VITALS — BODY MASS INDEX: 41.78 KG/M2 | WEIGHT: 235.8 LBS | HEIGHT: 63 IN

## 2021-06-03 VITALS
RESPIRATION RATE: 14 BRPM | DIASTOLIC BLOOD PRESSURE: 82 MMHG | WEIGHT: 238 LBS | SYSTOLIC BLOOD PRESSURE: 124 MMHG | BODY MASS INDEX: 42.17 KG/M2 | HEIGHT: 63 IN | HEART RATE: 68 BPM

## 2021-06-03 VITALS — HEIGHT: 63 IN | WEIGHT: 234.8 LBS | BODY MASS INDEX: 41.6 KG/M2

## 2021-06-04 VITALS
SYSTOLIC BLOOD PRESSURE: 140 MMHG | HEART RATE: 90 BPM | BODY MASS INDEX: 45.23 KG/M2 | DIASTOLIC BLOOD PRESSURE: 86 MMHG | WEIGHT: 251.3 LBS

## 2021-06-05 VITALS
SYSTOLIC BLOOD PRESSURE: 142 MMHG | OXYGEN SATURATION: 97 % | TEMPERATURE: 98.3 F | HEART RATE: 92 BPM | DIASTOLIC BLOOD PRESSURE: 88 MMHG | WEIGHT: 255.2 LBS | BODY MASS INDEX: 45.93 KG/M2

## 2021-06-05 VITALS
TEMPERATURE: 98.2 F | WEIGHT: 257 LBS | BODY MASS INDEX: 46.26 KG/M2 | OXYGEN SATURATION: 95 % | DIASTOLIC BLOOD PRESSURE: 84 MMHG | HEART RATE: 95 BPM | SYSTOLIC BLOOD PRESSURE: 142 MMHG

## 2021-06-06 NOTE — PROGRESS NOTES
[facility]  ENDOCRINOLOGY    Osteoporosis Follow Up 3/16/2020    Nikki Michelle, 1950, [id number]          Reason for visit      1. Other osteoporosis without current pathological fracture        History     Nikki Michelle is a very pleasant 69 y.o. old female who presents for follow up.   SUMMARY:  Nikki is here today in referral for Osteoporosis. She has been sent by her Oncologist, who had her get a Dexa Scan because of treatment for Breast Cancer.  She was started on Reclast as treatment, and then developed Iritis afterward. She has been leery of getting anything else started because of a hx of tooth decay and abscesses.  She is currently on Tamoxifen. Her latest Dexa Scan (in 2019 at [facility], and available through [facility] Everywhere) notes a T score of -2.5 in her spine, as well as noted decline since her last Scan.  Pt was scheduled before she could get a current Vit D level or Calcium level.         Risk Factors     The following high- risk conditions have been ruled out: celiac disease, eating disorders, gastric bypass, hyperparathyroidism, inflammatory bowel disease, hyperthyroidism, rheumatoid arthritis, lupus, chronic kidney disease.    Nikki Michelle has the following risk factors: Age, Female gender and     She is not on high risk medications such as glucocorticoids, anti-coagulants, anti-convulsants, chemotherapy or levothyroxine.    Patient deniesHysterectomy, Oophrectomy and Family history of breast cancer.      Past Medical History     Patient Active Problem List   Diagnosis     Malignant neoplasm of upper-outer quadrant of right breast in female, estrogen receptor positive (H)     Osteoporosis     Class 3 drug-induced obesity with serious comorbidity and body mass index (BMI) of 40.0 to 44.9 in adult (H)     Binge eating disorder     Prediabetes     Other sleep apnea     Osteoarthritis     History of kidney stones     Use of tamoxifen (Nolvadex)     Mixed hyperlipidemia      Allergic rhinitis     Calculus of gallbladder     Esophageal reflux     Headache     Hypothyroidism     Infiltrating ductal carcinoma of right breast (H)     Nephrolithiasis     Psoriasis     Sinusitis, chronic       Family History       family history includes Diabetes in her brother; Heart disease in her father; Heart disease (age of onset: 70) in her brother; Hypertension in her daughter and son; Stroke in her mother.    Social History      reports that she quit smoking about 44 years ago. She has a 2.50 pack-year smoking history. She has never used smokeless tobacco. She reports that she does not drink alcohol or use drugs.      Review of Systems     Patient denies current pain, limited mobility, fractures.   Remainder per HPI.      Vital Signs     /86 (Patient Site: Left Arm, Patient Position: Sitting, Cuff Size: Adult Large)   Pulse 90   Wt (!) 251 lb 4.8 oz (114 kg)   BMI 45.23 kg/m      Physical Exam     GENERAL:  Normal, NIRD, obese  EYES:  Pupils equal, round and reactive to light; no proptosis, lid lag or  periorbital edema.  THYROID:  Thyroid is normal.  No tenderness or bruit  NECK: No lymph nodes  MUSCULOSKELETAL: No joint abnormalities, FROM in all four extremities. No kyphosis. Muscle strength grossly normal without evidence of wasting.  HEART:  Regular rate and rhythm without murmur.  LUNGS:  Clear to auscultation.  ABDOMEN:Soft, non-tender, no masses or organomegaly  NEURO:  Patella Reflexes were normal.No tremors  SKIN:  No acanthosis nigricans or vitiligo        Assessment     1. Other osteoporosis without current pathological fracture        Plan     Today, we fully discussed starting Prolia injections. Discussed mechanisms of action, likely duration of treatment and possible side effects. All questions answered to her satisfaction. Given handouts from [employer]. She will take them home and discuss starting therapy with her . Understands that we have to get a PA before treatment  can begin. F/u with me in 1 year, and if starting therapy, will get another Dexa Scan done to see if it is working.     Total visit minutes:30  Time spent counseling and coordination of care:25    Lotus Castellanos   Endocrinology  3/16/2020  12:32 PM        This note has been dictated using voice recognition software.  Any grammatical or context distortions are unintentional and inherent to the software.     Current Medications     Outpatient Medications Prior to Visit   Medication Sig Dispense Refill     b complex vitamins tablet Take 1 tablet by mouth daily.       calcium-magnesium 300-300 mg Tab Take by mouth.       cholecalciferol, vitamin D3, 5,000 unit Tab Take 5,000 Units by mouth.       docoshexanoic acid-eicosapent 500 mg (FISH OIL) 500-100 mg cap capsule Take 500 mg by mouth daily.       levothyroxine (SYNTHROID, LEVOTHROID) 100 MCG tablet Take 1 tablet by mouth every morning before breakfast.       naproxen sodium (ALEVE ORAL) Take by mouth.       omeprazole (PRILOSEC) 20 MG capsule TK 1 C PO QD AC  3     tamoxifen (NOLVADEX) 20 MG tablet TAKE 1 TABLET(20 MG) BY MOUTH DAILY 90 tablet 3     triamcinolone (KENALOG) 0.1 % ointment Apply 1 application topically 2 (two) times a day as needed.        acetaminophen (TYLENOL) 325 MG tablet Take 650 mg by mouth every 6 (six) hours as needed for pain.       EPINEPHrine (EPIPEN) 0.3 mg/0.3 mL (1:1,000) atIn Inject into the shoulder, thigh, or buttocks once. PRN for allergic reaction       fexofenadine (ALLEGRA) 180 MG tablet Take 180 mg by mouth daily. seasonal       metFORMIN (GLUCOPHAGE-XR) 500 MG 24 hr tablet TAKE 2 TABLETS(1000 MG) BY MOUTH DAILY WITH BREAKFAST 180 tablet 3     vitamin E 400 UNIT capsule Take 400 Units by mouth daily.       aspirin 81 MG EC tablet Take 81 mg by mouth daily.       buPROPion (WELLBUTRIN XL) 150 MG 24 hr tablet Take 1 tablet (150 mg total) by mouth every morning. 90 tablet 0     naproxen (NAPROSYN) 500 MG tablet Take 1 tablet by  mouth daily. As needed       No facility-administered medications prior to visit.          Lab Results     TSH   Date Value Ref Range Status   04/26/2018 2.18 0.30 - 5.00 uIU/mL Final     Calcium   Date Value Ref Range Status   07/09/2019 9.4 8.5 - 10.5 mg/dL Final           Imaging Results   Last DEXA scan:  No results found for this or any previous visit.

## 2021-06-06 NOTE — TELEPHONE ENCOUNTER
Please call [facility] and obtain pt's last 2-3 (she was unsure how many there are) Bone Density Scans for me.

## 2021-06-07 NOTE — TELEPHONE ENCOUNTER
Patient called and left a message for refill of Tamoxifen.  She requested in be sent to [facility] in [place].  Last refiiled on 3/14/19. Quantity #90. 3 refills.  Message sent to IVETTE Chen CNP as Dr. Young is out of the office this week/JULIETA Cleary RN

## 2021-06-07 NOTE — PROGRESS NOTES
"Nikki Michelle is a 69 y.o. female who is being evaluated via a billable telephone visit.      The patient has been notified of following:     \"This telephone visit will be conducted via a call between you and your physician/provider. We have found that certain health care needs can be provided without the need for a physical exam.  This service lets us provide the care you need with a short phone conversation.  If a prescription is necessary we can send it directly to your pharmacy.  If lab work is needed we can place an order for that and you can then stop by our lab to have the test done at a later time.    Telephone visits are billed at different rates depending on your insurance coverage. During this emergency period, for some insurers they may be billed the same as an in-person visit.  Please reach out to your insurance provider with any questions.    If during the course of the call the physician/provider feels a telephone visit is not appropriate, you will not be charged for this service.\"    Patient has given verbal consent to a Telephone visit? Yes    Patient would like to receive their AVS by AVS Preference: Herve.    Additional provider notes:  Ms. Nikki Michelle states that she is doing well overall at this time.  She decided to continue with tamoxifen for the breast cancer.  She is still tolerating it well.  She has figured out a way to have it picked up as a curbside pickup at her pharmacy.  She says that she has 1 more refill left which should be adequate before she completes a 5 years of tamoxifen in September.    She and her family are remaining safe at home and taking all precautions to prevent a coronavirus infection.  She says that she was sick with an upper respiratory infection in the winter which was more severe than usual and she does not want a repeat episode.    She had questions for me whether she should try hydroxychloroquine prophylactically for the coronavirus infection.  I " discussed with her that there is very little evidence that it actually works.  Also it is not an innocuous drug.  It can have side effects at times.  I am not in favor of that.  She voiced understanding.    She says that even though they are staying at home safe, she is getting enough exercise with gardening.  She has also restarted a walking program.  She remains physically active.    I reviewed her symptoms with her.  She has not noticed any lumps or bumps anywhere.  No aches or pains.  Breathing is fine.  No chest pain or cough.  Weight has been stable.  Appetite is normal.  No nausea or vomiting or abdominal pain.  No constipation or diarrhea.  No blood in stool or black stools.  No vaginal bleeding.  No difficulty with urination.  No skin rashes.  No numbness or tingling.    She shares with me that her grandson had a clavicular fracture after he had an accidental fall while he was attending a wrestling match.  Thankfully he is healing well.    We discussed about our plan for treatment going forward.  She would like to complete tamoxifen when she has done 5 years on it.  She knows that she will need a mammogram in September.  We decided to meet after that.    I reminded her that she should continue follow-up with endocrinology for the osteoporosis.  She voiced understanding.    She requested me for a referral to primary care in [facility].  She states that she feels more comfortable with the [facility] system.  I will do so.    A&P:    1. Malignant neoplasm of upper-outer quadrant of right breast in female, estrogen receptor positive (H)  Mammo Screening Bilateral    Ambulatory referral to Family Practice   2. Use of tamoxifen (Nolvadex)     3. Age-related osteoporosis without current pathological fracture     4. Encounter for screening mammogram for malignant neoplasm of breast   Mammo Screening Bilateral     1.  She is to continue with the tamoxifen daily.    2.  I am making a referral to primary care for her.   She would like to consolidate her care in the [facility] system.    3.  I have ordered her annual mammogram for September.  I have asked for this to be scheduled and after that she will follow-up with me.  If she is doing well at that point, we can consider stopping tamoxifen.  I would also ask her whether she would like to follow-up annually with oncology or with a primary care.    4.  I discussed with her about the need to continue taking the calcium and vitamin D tablets, weightbearing exercise and follow-up with endocrinology for Prolia treatment for the osteoporosis.      Phone call duration:  Start: 11:24  Stop: 11:46    Laila Young MD

## 2021-06-08 NOTE — PROGRESS NOTES
[facility] Hematology and Oncology Progress Note    Patient: Nikki Michelle  MRN: [id number]  Date of Service: 02/16/2017      Reason for Visit    Follow-up regarding breast cancer.    Assessment and Plan  Breast cancer of upper-outer quadrant of right female breast    Primary site: Breast (Right)    Staging method: AJCC 7th Edition    Pathologic: Stage IB (T1b, N1mi, cM0) signed by Laila Young MD on 11/13/2015 12:54 PM    Summary: Stage IB (T1b, N1mi, cM0)    Prognostic indicators:             ESTROGEN RECEPTOR: POSITIVE (95% OF CELLS) PROGESTERONE RECEPTOR: POSITIVE       (80% OF CELLS) HER2/ESTUARDO: NEGATIVE (STAINING PATTERN 0 OUT OF 3)       ECOG Performance   ECOG Performance Status: 1    Distress Assessment  Distress Assessment Score: 1: No intervention is needed today.    Pain   : No pain now.    Ms. Nikki Michelle is a 66 y.o. 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  The carcinoma was 9 mm in size and there were micrometastases in the sentinel lymph node.  It was ER/WY positive and HER-2 negative.  Oncotype DX score was 10.  It was decided not to go for adjuvant chemotherapy.  She completed adjuvant radiation on 1/22/16 and started adjuvant Arimidex on 1/23/16.  She had a mammogram done on 9/6/16 where they felt that there is a mass at the 3 o'clock position in the left breast.  She had an ultrasound done the next day and this appears to be benign cysts.  We will continue with yearly mammogram.      1.  She is recovering from a viral syndrome with cold-like symptoms and gastroenteritis.  She has lost some weight when she was sick.  She intends to try to lose some more weight taking this as instructed.  Otherwise she appears to be doing well.  She is taking Arimidex daily and she feels that she is tolerating it well.  She has some hot flashes but she feels it is tolerable.  She  says that she has enough of antiemetics at home.  She will call when she needs a refill.  At this point I do not see any evidence of cancer recurrence.    2.  Osteoporosis: She was diagnosed to have osteoporosis and a DEXA scan that was done on 10/13/16.  She decided to continue with Arimidex but take annual Zometa for the osteoporosis.  She received the first dose of Zometa on 11/21/16.  A few days later she called in with complaints of right eye pain.  She has been evaluated by ophthalmology and she tells me that she was diagnosed to have iritis in the right eye.  This is subsided with steroid drops.  I do not think that this was caused by Zometa.  I think we can continue with Zometa.  I encouraged her to continue with calcium and vitamin D daily.  Encouraged her to try to increase her activity level.    3.  Follow-up: I have asked for a bilateral screening mammogram to be scheduled in September.  She can see me on Pato Chen NP soon after.  I have asked for her to be scheduled for labs and Zometa on that day.  She was very glad to change over to 6 monthly follow-up.  I encouraged her to give us a call if she feels any new symptoms.    Time spend >25 minutes face to face with the patient. More than 50 % in counseling and coordination of care.    Problem List    1. Breast cancer of upper-outer quadrant of right female breast  Mammo Screening Bilateral   2. Osteoporosis     3. Aromatase inhibitor use     4. Encounter for screening mammogram for malignant neoplasm of breast   Mammo Screening Bilateral        CC: Urmila Meneses MD     ______________________________________________________________________________    History of Present Illness    Ms. Nikki Michelle is here for follow-up alone.    She says that she has been suffering a cold for the last 3 weeks.  She had nasal congestion and a sore throat and cough.  She also had some nausea and vomiting for about 3 days.  She developed diarrhea which is  quite frequent initially.  She still has loose stools but it has become much less frequent now.  She feels that she is recovering well from the viral illness.  She says that she was not eating that well when she was sick and so has lost some weight.  She intends to take this as a stop to losing some more weight.  Her  is already working on losing weight.    She says that a few days after she had Zometa in November she developed right eye pain.  Initially she thought that this was a reaction to Zometa.  She then saw an ophthalmologist and was diagnosed to have a severe case of iritis on the right side.  She was on steroid eyedrops.  The iritis has subsided.  She says that it is not thought that the iritis was caused by the Zometa.  As of now the primary cause was not identified.    She is taking Arimidex daily.  She has some hot flashes but she feels that overall it is tolerable.  She is trying to remain physically active.  She is taking calcium and vitamin D tablets daily.    No fevers or night sweats now.  No lumps or bumps anywhere.  No new aches or pains.  No unusual headaches.  Vision is now stable.  No mouth sores.  No swallowing difficulty.  No shortness of breath.  No chest pain.  No abdominal pain.  No constipation or diarrhea.  No vaginal bleeding.  No difficulty passing urine.  No skin rashes.  No numbness or tingling in hands or feet.    Please see below.  A 14 point review of system is otherwise completely negative.      Pain Status  Currently in Pain: No/denies    Review of Systems    Constitutional  Fatigue: None  Fever: None  Chills: None  Weight Gain: None  Weight Loss: None  Neurosensory  Peripheral Motor Neuropathy: None  Ataxia: None  Peripheral Sensory Neuropathy: None  Confusion: None  Syncope: None  Cardiovascular  Cardiovascular (WDL): All cardiovascular elements are within defined limits  Pulmonary  Cough: Mild symptoms, nonprescription intervention indicated (productive with clear  "phelgm X 3 weeks)  Dyspnea: None  Hypoxia: None  Gastrointestinal  Anorexia: Loss of appetite without alteration in eating habits  Constipation: None  Diarrhea: Increase of 4 - 6 stools per day over baseline, moderate increase in ostomy output compared to baseline (x 3 weeks-was watery but now loose brown-denies blood in stool)  Dysphagia: None  Esophagitis: Asymptomatic, clinical or diagnostic observations only, intervention not indicated (last night from what ate-took omeprazole.)  Nausea: None  Pharyngitis: None  Vomiting: None  Dysgeusia: Altered taste but no change in diet (\"everything tastes and smells icky\")  Dry Mouth: None  Genitourinary  Genitourinary (WDL): All genitourinary elements are within defined limits  Integumentary  Integumentary (WDL): All integumentary elements are within defined limits  Patient Coping     Distress Assessment  Distress Assessment Score: 1  Accompanied by       Past History  Past Medical History:   Diagnosis Date     Allergic rhinitis      Breast cancer      Breast cyst     left breast for many years     Chronic sinus infection     has not had any recent recurrences.     Eczema      Environmental allergies     dust mites.     GERD (gastroesophageal reflux disease)      H/O acute pancreatitis 2005     Hx of radiation therapy 2015    right breast lumpectomy     Hypothyroidism      Infectious mononucleosis 1971     Migraine     premenstrual migraines. Not any more.     Nephrolithiasis      Obstructive sleep apnea     uses CPAP     Osteoarthritis          Past Surgical History:   Procedure Laterality Date     BREAST CYST ASPIRATION Left     benign many years ago     FINGER SURGERY Right     2nd right finger d/t laceration of tendon     LASIK Bilateral 2005     [place] MASTECTOMY, PARTIAL Right 9/28/2015    Procedure: RIGHT LUMPECTOMY WIRE LOCALIZATION;  Surgeon: Raeann Wall MD;  Location: [facility];  Service: General     SENTINEL LYMPH NODE BIOPSY Right 9/28/2015 "           Physical Exam    Recent Vitals 2/16/2017   Weight 245 lbs 11 oz   /79   Pulse 86   Temp 97.5   Temp src 1   SpO2 97       GENERAL: Alert and oriented. Seated comfortably. In no distress.  Heavyset.  She looks well.    HEAD: Atraumatic and normocephalic.  Has a full head of hair.    EYES: SHYAM, EOMI.  No pallor.  No icterus.    Oral cavity: no mucosal lesion or tonsillar enlargement.    NECK: supple. JVP normal.  No thyroid enlargement.    LYMPH NODES: No palpable, cervical, axillary or inguinal lymphadenopathy.    CHEST: clear to auscultation bilaterally.  Resonant to percussion throughout bilaterally.  Symmetrical breath movements bilaterally.    CVS: S1 and S2 are heard. Regular rate and rhythm.  No murmur or gallop or rub heard.    ABDOMEN: Soft. Not tender. Not distended.  No palpable hepatomegaly or splenomegaly.  No other mass palpable.  Bowel sounds heard.    EXTREMITIES: Warm.  No peripheral edema.    SKIN: no rash, or bruising or purpura.    BREASTS:  Right breast: The skin looks normal.  The surgical scar is healed.  No palpable mass.  No induration.  The nipple looks normal.  The right axilla is without mass or nodule.    Left breast: Contour looks normal.  The skin looks normal.  There is no palpable mass.  The nipple looks normal.  No mass or nodule in the left axilla.        Lab Results    No results found for this or any previous visit (from the past 168 hour(s)).    Imaging    No results found.      Signed by: Laila Young MD

## 2021-06-12 NOTE — PROGRESS NOTES
Patient is here for seven month follow-up of breast cancer. Had mammogram done.  Becca Lewis RN

## 2021-06-12 NOTE — PROGRESS NOTES
[facility] Hematology and Oncology Progress Note    Patient: Nikki Michelle  MRN: [id number]  Date of Service: 10/06/2020      Reason for Visit    Follow-up regarding breast cancer.    Assessment and Plan  Breast cancer of upper-outer quadrant of right female breast    Primary site: Breast (Right)    Staging method: AJCC 7th Edition    Pathologic: Stage IB (T1b, N1mi, cM0) signed by Laila Young MD on 11/13/2015 12:54 PM    Summary: Stage IB (T1b, N1mi, cM0)    Prognostic indicators:             ESTROGEN RECEPTOR: POSITIVE (95% OF CELLS) PROGESTERONE RECEPTOR: POSITIVE       (80% OF CELLS) HER2/ESTUARDO: NEGATIVE (STAINING PATTERN 0 OUT OF 3)       ECOG Performance   ECOG Performance Status: 1    Distress Assessment  Distress Assessment Score: No distress:  Pain  Pain Score (Initial OR Reassessment): 2: Only some sinus congestion and discomfort.  Continue with the treatments per ENT.    Ms. Nikki Michelle is a 70 y.o. 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    The carcinoma was 9 mm in size and there were micrometastases in the sentinel lymph node.  It was ER/AZ positive and HER-2 negative.  Oncotype DX score was 10.    It was decided not to go for adjuvant chemotherapy.  She completed adjuvant radiation on 1/22/16 and started adjuvant Arimidex on 1/23/16.    She has osteoporosis.  She had episodes of iritis with Reclast infusions.  So she decided to stop Reclast.  She changed from Arimidex to tamoxifen on 12/13/17 on account of the osteoporosis.    1.  From the breast cancer point of view, she appears to be doing quite well.  Nothing concerning for recurrence is found on physical examination.  She has had her annual mammogram done on 10/1/2020.  I reviewed the report with her.  There were no concerning lesions found.  The breasts are almost entirely fatty.  She was glad to hear  that.    2.  We had a discussion about ongoing treatment of the breast cancer.  In January she will continue complete 5 years of being on estrogen deprivation therapy.  We discussed whether to stop at that point or to continue for another 10 years.  I discussed with her that the trials that have been reported so far have shown that by continuing estrogen blockade for another 5 years there is a lower risk of recurrence of breast cancer but they have not yet shown any improvement in overall survival.  Considering that she did not have a particularly aggressive or high risk breast cancer, and also considering the side effect that she is having from estrogen blockade, I think it is okay for her to stop at 5 years.  She also felt the same.    3.  We then discussed about the long-term follow-up.  She would like to come back and see me in January before stopping tamoxifen.  After that she is inclined at this point to continue follow-up with a mammogram with us once a year.  I discussed with her that it is okay for her to follow-up with her PCP also with a breast exam and a mammogram once a year if that is convenient for her.  At this time her thought is that she will speak with us.    4.  For the osteoporosis, she has already been referred to the osteoporosis clinic.  She tells me that she really does not want to take a bisphosphonate or Prolia because of fear of osteonecrosis of the jaw.  I encouraged her to continue taking the calcium and vitamin D tablets daily and particularly emphasized to her the need for weightbearing exercise.  She plans to follow through with that.    5.  She was given the seasonal flu vaccine today.    6.  Follow-up: Return to clinic with MD or NP in the second half of January 2021.    Time spend >25 minutes face to face with the patient. More than 50 % in counseling and coordination of care.        Problem List    1. Malignant neoplasm of upper-outer quadrant of right breast in female, estrogen  receptor positive (H)     2. Age-related osteoporosis without current pathological fracture     3. Use of tamoxifen (Nolvadex)     4. Encounter for immunization  influenza vaccine high dose (PF) (age >/= 65) 2020-21 injection 0.7 mL (FLUZONE HIGH DOSE)        CC: Urmila Meneses MD     ______________________________________________________________________________    History of Present Illness    Ms. Nikki Michelle is here for follow-up alone.    Overall she feels that she is doing okay.  She still continues on her tamoxifen but she feels that she is having a lot of hot flashes.  She is managing that however.    She admits that she has not been able to be as physically active as earlier and has gained weight.  She is now planning to buy a [employer] machine and get started with exercise and lose weight.    She says that she still feels that she has chronic sinusitis with some achiness in the back of her head and also a congested feeling in her nose and sinuses all the time.  She is following with an ENT physician.    She has decided that she really does not want to do more than calcium and vitamin D and weightbearing exercises for the osteoporosis.  She has had a discussion with her dentist and she is very much worried about the possibility of osteonecrosis of the jaw with bisphosphonates.    She tells me that her grandson has had the clavicular fracture healed and he is back to [employer].  Apparently he came in the national team issues.  She is very thrilled about that.    No fevers or night sweats.  No lumps or bumps anywhere.  No unusual headaches.  No eyesight problems.  No mouth sores.  No swallowing difficulty.  No nausea or vomiting.  No chest pain.  No palpitation.  No shortness of breath.  The only cough that she has is from the postnasal drip.  No abdominal pain.  No constipation or diarrhea.  No blood in stool or black stools.  No skin rashes.  No vaginal bleeding.    Please see below.  A 14 point  review of system is otherwise completely negative.      Pain Status  Currently in Pain: Yes    Review of Systems    Constitutional  Constitutional (WDL): Exceptions to WDL  Weight Gain: 5 - <10% from baseline  Neurosensory  Neurosensory (WDL): All neurosensory elements are within defined limits  Cardiovascular  Cardiovascular (WDL): All cardiovascular elements are within defined limits  Pulmonary  Respiratory (WDL): Within Defined Limits  Gastrointestinal  Gastrointestinal (WDL): All gastrointestinal elements are within defined limits  Genitourinary  Genitourinary (WDL): All genitourinary elements are within defined limits  Integumentary  Integumentary (WDL): All integumentary elements are within defined limits  Patient Coping  Patient Coping: Open/discussion  Distress Assessment  Distress Assessment Score: No distress  Accompanied by  Accompanied by: Alone    Past History  Past Medical History:   Diagnosis Date     Allergic rhinitis      Breast cancer (H) 2015    right breast     Breast cyst     left breast for many years     Candidal intertrigo 4/26/2018     Chronic sinus infection     has not had any recent recurrences.     Eczema      Environmental allergies     dust mites.     GERD (gastroesophageal reflux disease)      H/O acute pancreatitis 2005     Hx of radiation therapy 2015    right breast lumpectomy     Hypothyroidism      Infectious mononucleosis 1971     Migraine     premenstrual migraines. Not any more.     Nephrolithiasis      Obstructive sleep apnea     uses CPAP     Osteoarthritis          Past Surgical History:   Procedure Laterality Date     BREAST CYST ASPIRATION Left     benign many years ago     FINGER SURGERY Right     2nd right finger d/t laceration of tendon     LASIK Bilateral 2005     NM MASTECTOMY, PARTIAL Right 9/28/2015    Procedure: RIGHT LUMPECTOMY WIRE LOCALIZATION;  Surgeon: Raeann Wall MD;  Location: [facility];  Service: General     SENTINEL LYMPH NODE BIOPSY Right  9/28/2015           Physical Exam    Recent Vitals 10/6/2020   Height -   Weight 257 lbs   BSA (m2) 2.27 m2   /84   Pulse 95   Temp 98.2   Temp src 1   SpO2 95   Some recent data might be hidden     GENERAL: Alert and oriented to time place and person. Seated comfortably. In no distress.  Heavyset.  She looks about the same as in the previous visits.  Very pleasant.    HEAD: Atraumatic and normocephalic.    EYES: SHYAM, EOMI.  No pallor.  No icterus.    Oral cavity: no mucosal lesion or tonsillar enlargement.    NECK: supple. JVP normal.  No thyroid enlargement.    LYMPH NODES: No palpable, cervical, axillary or inguinal lymphadenopathy.    CHEST: clear to auscultation bilaterally.  Resonant to percussion throughout bilaterally.  Symmetrical breath movements bilaterally.    CVS: S1 and S2 are heard. Regular rate and rhythm.  No murmur or gallop or rub heard.  No peripheral edema.    ABDOMEN: Soft. Not tender. Not distended.  No palpable hepatomegaly or splenomegaly.  No other mass palpable.  Bowel sounds heard.    EXTREMITIES: Warm.    SKIN: no rash, or bruising or purpura.  Has a full head of hair.    BREASTS:  Right breast: Skin looks normal.  The surgical scar has healed well and is very faint now.  No underlying induration.  No mass palpable in the right breast.  The nipple looks normal.  The right axilla is without mass or nodule.    Left breast: Contour looks normal.  Skin looks normal.  There is no palpable mass.  The nipple looks normal.  No mass or nodule in the left axilla.        Lab Results    No results found for this or any previous visit (from the past 168 hour(s)).    Imaging    Mammo Screening Bilateral    Result Date: 10/1/2020  BILATERAL FULL FIELD DIGITAL SCREENING MAMMOGRAM Performed on: 10/1/20 Compared to: 09/23/2019 Mammo Screening Bilateral, 09/20/2018 Mammo Screening Bilateral, and 09/18/2017 Mammo Screening Bilateral Findings:  The breasts are almost entirely fatty. There is no  radiographic evidence of malignancy. This study was evaluated with the assistance of Computer-Aided Detection. Continue routine screening mammogram as recommended. There are breast conservation changes in the right breast. ACR BI-RADS Category 2: Benign        Signed by: Laila Young MD

## 2021-06-13 NOTE — PROGRESS NOTES
[facility] Hematology and Oncology Progress Note    Patient: Nikki Michelle  MRN: [id number]  Date of Service: 09/20/2017      Reason for Visit    Follow-up regarding breast cancer.    Assessment and Plan  Breast cancer of upper-outer quadrant of right female breast    Primary site: Breast (Right)    Staging method: AJCC 7th Edition    Pathologic: Stage IB (T1b, N1mi, cM0) signed by Laila Young MD on 11/13/2015 12:54 PM    Summary: Stage IB (T1b, N1mi, cM0)    Prognostic indicators:             ESTROGEN RECEPTOR: POSITIVE (95% OF CELLS) PROGESTERONE RECEPTOR: POSITIVE       (80% OF CELLS) HER2/ESTUARDO: NEGATIVE (STAINING PATTERN 0 OUT OF 3)       ECOG Performance   ECOG Performance Status: 0    Distress Assessment  Distress Assessment Score: No distress: No intervention is needed today.    Pain   : No pain now.    Ms. Nikki Michelle is a 67 y.o. 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  The carcinoma was 9 mm in size and there were micrometastases in the sentinel lymph node.  It was ER/WV positive and HER-2 negative.  Oncotype DX score was 10.  It was decided not to go for adjuvant chemotherapy.  She completed adjuvant radiation on 1/22/16 and started adjuvant Arimidex on 1/23/16.  She had a mammogram done on 9/6/16 where they felt that there is a mass at the 3 o'clock position in the left breast.  She had an ultrasound done the next day and this appears to be benign cysts.  We will continue with yearly mammogram.    1.  She appears to be doing well overall.  No suspicious finding on history and physical.  She had a mammogram done on 9/18/17.  No suspicious lesion in either breast.    2.  At this time I think we can continue with regular follow-up.  She requests a refill of the Arimidex.  I have sent a new prescription to the pharmacy.  We will plan to continue Arimidex for at least 5  years (January 2021).    3.  She has osteoporosis in the DEXA scan that was done on 10/13/16.  She is taking annual Zometa for osteoporosis.  I have asked for the Zometa to be scheduled with labs in November 2017.  We will plan on repeating a DEXA scan in 2018.    4.  She will need a follow-up mammogram in September 2018.    5.  I am not sure what the loss of taste is from.  Certainly this is not interfering with her food intake.  She has not lost any weight.  I suggested a referral to ENT.  She says she will think about it and let me know if she wants to see ENT.    6.  Follow-up: Return to clinic with me or Pato Chen NP in 6 months.  In the interim if she has any suspicious symptoms I asked her to give us a call.    Time spend 25 minutes face to face with the patient. More than 50 % in counseling and coordination of care.    Problem List    1. Breast cancer of upper-outer quadrant of right female breast  anastrozole (ARIMIDEX) 1 mg tablet   2. Aromatase inhibitor use     3. Osteoporosis          CC: Urmila Meneses MD     ______________________________________________________________________________    History of Present Illness    Ms. Nikki Michelle he is here for follow-up alone.  She says that overall she is doing well.  She has gained weight since she last saw me.  She says that she is eating well.  On the other hand she feels that her taste is poor.  She does not know what that is from.  No loss of smell.    She says that she has some few arthritic aches here and there at the joints.  She mentions that a couple of months ago she was on vacation on [facility] and she had diffuse aches all over the body.  The feeling went away the day after she came back.    She also had some kidney stones removed from both sites.  After the kidney stones were removed the pedal edema that she had subsided.    She says that if she walks very fast then she feels short of breath.  Otherwise she is able to take  care of all the regular work.  She has been doing a lot of jaime over the summer.  It has been a good summer.    No fevers.  No night sweats.  No lumps or bumps anywhere.  No new aches or pains.  No unusual headaches.  No chest pain or cough.  No nausea or vomiting or abdominal pain.  No constipation or diarrhea.  No blood in stool or black stools.  No vaginal bleeding.  No difficulty passing urine.  No skin rashes.    Please see below.  A 14 point review system is otherwise completely negative.    Pain Status  Currently in Pain: No/denies    Review of Systems    Constitutional  Constitutional (WDL): Exceptions to WDL  Fatigue: None  Fever: None  Chills: None  Weight Gain: 5 - <10% from baseline (up 12# in 6 months)  Weight Loss: None  Neurosensory  Neurosensory (WDL): All neurosensory elements are within defined limits  Cardiovascular  Cardiovascular (WDL): Exceptions to WDL  Palpitations: None  Edema: No  Phlebitis: None  Superficial thrombophlebitis: None  Pulmonary  Respiratory (WDL): Within Defined Limits  Gastrointestinal  Gastrointestinal (WDL): All gastrointestinal elements are within defined limits  Genitourinary  Genitourinary (WDL): Exceptions to WDL  Urinary Frequency: Present (resolved after yesterday)  Urinary Retention: None  Urinary Tract Pain: None  Integumentary  Integumentary (WDL): Exceptions to WDL  Alopecia: None  Rash Maculo-Papular: Macules/papules covering <10% BSA with or without symptoms (e.g., pruritus, burning, tightness) (arms, backs of legs)  Pruritus: None  Urticaria: None  Palmar-Plantar Erythrodysesthesia Syndrome: None  Flushing: None  Patient Coping  Patient Coping: Accepting  Distress Assessment  Distress Assessment Score: No distress  Accompanied by  Accompanied by: Alone    Past History  Past Medical History:   Diagnosis Date     Allergic rhinitis      Breast cancer      Breast cyst     left breast for many years     Chronic sinus infection     has not had any recent  "recurrences.     Eczema      Environmental allergies     dust mites.     GERD (gastroesophageal reflux disease)      H/O acute pancreatitis 2005     Hx of radiation therapy 2015    right breast lumpectomy     Hypothyroidism      Infectious mononucleosis 1971     Migraine     premenstrual migraines. Not any more.     Nephrolithiasis      Obstructive sleep apnea     uses CPAP     Osteoarthritis          Past Surgical History:   Procedure Laterality Date     BREAST CYST ASPIRATION Left     benign many years ago     FINGER SURGERY Right     2nd right finger d/t laceration of tendon     LASIK Bilateral 2005     IN MASTECTOMY, PARTIAL Right 9/28/2015    Procedure: RIGHT LUMPECTOMY WIRE LOCALIZATION;  Surgeon: Raeann Wall MD;  Location: [facility];  Service: General     SENTINEL LYMPH NODE BIOPSY Right 9/28/2015           Physical Exam    Recent Vitals 9/20/2017   Height 5' 2\"   Weight 257 lbs 8 oz   BSA (m2) 2.26 m2   /74   Pulse 101   Temp -   Temp src -   SpO2 97   Some recent data might be hidden       GENERAL: Alert and oriented. Seated comfortably. In no distress.  Heavyset.  She looks well.  She looks about the same as in the last visit.    HEAD: Atraumatic and normocephalic.  Has a full head of hair.    EYES: SHYAM, EOMI.  No pallor.  No icterus.    Oral cavity: no mucosal lesion or tonsillar enlargement.    NECK: supple. JVP normal.  No thyroid enlargement.    LYMPH NODES: No palpable, cervical, axillary or inguinal lymphadenopathy.    CHEST: clear to auscultation bilaterally.  Resonant to percussion throughout bilaterally.  Symmetrical breath movements bilaterally.    CVS: S1 and S2 are heard. Regular rate and rhythm.  No murmur or gallop or rub heard.    ABDOMEN: Soft. Not tender. Not distended.  No palpable hepatomegaly or splenomegaly.  No other mass palpable.  Bowel sounds heard.    EXTREMITIES: Warm.  No peripheral edema.    SKIN: no rash, or bruising or purpura.    BREASTS:  Right breast: " The skin looks normal.  The surgical scar is healed well.  No underlying induration palpable.  No mass palpable in the breast.  The nipple looks normal.  The right axilla is without mass or nodule.    Left breast: The contour looks normal.  The skin looks normal.  There is no palpable mass.  The nipple looks normal.  No mass or nodule in the left axilla.      Lab Results    No results found for this or any previous visit (from the past 168 hour(s)).    Imaging    Mammo Screening Bilateral    Result Date: 9/18/2017  BILATERAL FULL FIELD DIGITAL SCREENING MAMMOGRAM Performed on: 9/18/17 Compared to: 09/06/2016 Mammo Diagnostic Bilateral, and 08/31/2015 MAMMO SCREENING BILATERAL Findings: The breasts have scattered areas of fibroglandular densities.  There are postsurgical lumpectomy changes in the right breast. No evidence for malignancy and no change from the previous exam(s). This study was evaluated with the assistance of Computer-Aided Detection. Continue routine screening mammogram as recommended. ACR BI-RADS Category 2: Benign Findings         Signed by: Laila Young MD

## 2021-06-14 NOTE — PROGRESS NOTES
Pt came into infusion clinic for her Reclast as ordered. After having IV placed and labs drawn, pt decided she did not want infusion today. Her and her  have some concerns about side effects that they would like to speak with Dr. Young about. Pt made apt with Dr. Young. IV Dc'd, pt left infusion clinic via ambulatory.

## 2021-06-14 NOTE — PROGRESS NOTES
[facility] Hematology and Oncology Progress Note    Patient: Nikki Michelle  MRN: [id number]  Date of Service: 12/13/2017      Reason for Visit    Follow-up regarding breast cancer.    Assessment and Plan  Breast cancer of upper-outer quadrant of right female breast    Primary site: Breast (Right)    Staging method: AJCC 7th Edition    Pathologic: Stage IB (T1b, N1mi, cM0) signed by Laila Young MD on 11/13/2015 12:54 PM    Summary: Stage IB (T1b, N1mi, cM0)    Prognostic indicators:             ESTROGEN RECEPTOR: POSITIVE (95% OF CELLS) PROGESTERONE RECEPTOR: POSITIVE       (80% OF CELLS) HER2/ESTUARDO: NEGATIVE (STAINING PATTERN 0 OUT OF 3)       ECOG Performance   ECOG Performance Status: 1 (d/t deconditioning)    Distress Assessment  Distress Assessment Score: No distress: No intervention is needed today.    Pain   : No pain now.    Ms. Nikki Michelle is a 67 y.o. 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  The carcinoma was 9 mm in size and there were micrometastases in the sentinel lymph node.  It was ER/AL positive and HER-2 negative.  Oncotype DX score was 10.  It was decided not to go for adjuvant chemotherapy.  She completed adjuvant radiation on 1/22/16 and started adjuvant Arimidex on 1/23/16.  She had a mammogram done on 9/6/16 where they felt that there is a mass at the 3 o'clock position in the left breast.  She had an ultrasound done the next day and this appears to be benign cysts.  We will continue with yearly mammogram.    1.  From the breast cancer point of view she appears to be doing well.  She is not giving any symptoms suspicious of recurrence and there are no suspicious findings on physical examination.  The mammogram that was done on 9/18/17 did not show any suspicious lesion.    2.  She has now been on Arimidex for 1 month short of 2 years.  She has had some  weight gain but she was already heavy when she got started.  She also has some hot flashes.  Usually her pressures are manageable but sometimes they are severe but she has been able to tolerate them so far.  Our plan was to continue with Arimidex for at least 5 years (January 2021).    3.  She had osteoporosis in the DEXA scan that was done on 10/13/16.  This especially concerned the femoral neck bilaterally.  She was started on annual infusion of Reclast with the first dose given on 11/21/16.  She had an iritis episode a few days after the infusion.  She is worried that the Reclast may have caused it.  She did see an ophthalmologist at that time.  She says that the ophthalmologist did not think it was a medication that caused the iritis.  However she and her  have done some research and found that there is a rare possibility of iritis after Reclast infusion.  She is also worried about the possibility of osteonecrosis of the jaw because of the dental problems that she has had.  They have also found out about the possibility of atypical fractures that can happen for patients were on bisphosphonates long-term.  Today we had a long discussion about what to do going forward.  They wanted to know how much benefit she has from the Reclast both from the osteoporosis point of view and also from the breast cancer recurrence point of view.  I explained to them that the benefit from recurrence of breast cancer from the bisphosphonates is actually quite small.  The main benefit is from reducing the risk of a major fracture due to osteoporosis which can significantly disable her.  I discussed with them that the side effects mentioned above are certainly possible and are very well described but overall the risk-benefit ratio is in favor of continuing with the bisphosphonate.  I discussed with her that if she is concerned about her dental issues she can have all the dental work done and we can wait for the dose to heal  before we proceed with more bisphosphonates.    She then wanted to know how much benefit she has from taking the anastrozole.  I showed her the different from overall survival at 5 years and 10 years by plugging in her numbers into the [facility] predict online program.  I explained to her that for estrogen receptor positive cancer there is a long-term risk of recurrence that stretches for even beyond 10 years.  Thus I would strongly recommend continuing with anti-hormonal therapy.    4.  The patient and  were thinking of stopping both Reclast and anastrozole.  I then went through the options that we have.  Certainly she can stop both if that is what she wants to do.  Another option would be to stop the Reclast alone and continue the Demadex and keep a close watch on the osteoporosis.  I suggested the third option of taking tamoxifen instead of anastrozole.  I explained to her how tamoxifen works.  I explained to her that the effectiveness of tamoxifen in a postmenopausal woman for breast cancer recurrence is slightly smaller than an aromatase inhibitor.  Certainly all the other side effects are present.  The risk of blood clots may be slightly higher than aromatase inhibitors but she is already on a baby aspirin.  However usually tamoxifen is slightly protective against osteoporosis rather than worsening with the process.  After this long discussion she finally decided that she will not take the Reclast anymore but she will take tamoxifen.  I asked her to stop the anastrozole.  I am giving her a prescription for tamoxifen 20 mg p.o. daily for 90 days with 3 refills.    I encouraged her to give us a call if she has any problems taking tamoxifen.  We will plan on taking tamoxifen to complete at least the 5 years of anti-hormonal therapy that we planned originally.    5.  We will plan on repeating a DEXA scan in late 2018.  I reminded her to continue taking the calcium and vitamin D tablets.  I reminded her to remain  physically active and exercise.    6.  She will need her follow-up mammogram also in September 2018.    7.  If she is doing well on tamoxifen, she will come back to see me in March as already scheduled.  We will obtain a CBC differential platelets and CMP at that time.    Time spend >40 minutes face to face with the patient. More than 50 % in counseling and coordination of care.    Problem List    1. Malignant neoplasm of upper-outer quadrant of right breast in female, estrogen receptor positive  HM1(CBC and Differential)    Comprehensive Metabolic Panel   2. Aromatase inhibitor use     3. Age-related osteoporosis without current pathological fracture          CC: Urmila Meneses MD     ______________________________________________________________________________    History of Present Illness    Ms. Nikki Michelle is here for follow-up with her .    She asked to see me clear to discuss several issues.  She is worried about taking Reclast for osteoporosis.  She had an episode of iritis in November 2016 a few days after she received the first dose of Reclast.  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  She has a suspicion that the Reclast caused the iritis.  Other issue is that her dental health is not in good shape.  She has had extensive work on her teeth.  A few months ago she had a tooth abscess and the tooth had to be extracted.  She says that the dentist was concerned whether she will have a good healing because that she has already received a dose of Reclast.  She is concerned about the risk of osteonecrosis of the jaw.    She feels that she has gained weight with anastrozole.  She also has hot flashes.  Usually they are manageable but sometimes a hot flashes are very severe and makes her feel uncomfortable.    With these symptoms she wonders what it is worth continuing with Reclast once a year and she is also concerned whether taking anastrozole is worth the risk.  She essentially feels about the  same as when she saw me otherwise.  She has very occasional dizziness.  Sometimes she has a bit of tinnitus.  She has some arthralgias here and there but they do not trouble her much.  She also has some occasional skin rashes.    No fevers or night sweats.  No lumps or bumps anywhere.  No unusual headaches.  No eyesight problems.  She is eating well.  No nausea or vomiting.  No abdominal pain.  No shortness of breath.  No chest pain or cough.  No vaginal bleeding.  No difficulty moving bowels.  No difficulty passing urine.  No numbness or tingling in hands or feet.    Please see below.  A 14 point review of system is otherwise completely negative.      Please see below.  A 14 point review system is otherwise completely negative.    Pain Status  Currently in Pain: No/denies    Review of Systems    Constitutional  Constitutional (WDL): All constitutional elements are within defined limits  Fatigue: None  Fever: None  Chills: None  Weight Gain: 5 - <10% from baseline (7# in 3 months)  Weight Loss: None  Neurosensory  Neurosensory (WDL): Exceptions to WDL  Peripheral Motor Neuropathy: None  Ataxia: None  Peripheral Sensory Neuropathy: None  Confusion: None  Syncope: None  Cardiovascular  Cardiovascular (WDL): All cardiovascular elements are within defined limits  Pulmonary  Respiratory (WDL): Within Defined Limits  Dyspnea: None (d/t deconditioning)  Gastrointestinal  Gastrointestinal (WDL): All gastrointestinal elements are within defined limits  Genitourinary  Genitourinary (WDL): All genitourinary elements are within defined limits  Integumentary  Integumentary (WDL): Exceptions to WDL  Rash Maculo-Papular: Macules/papules covering 10 - 30% BSA with or without symptoms (e.g., pruritus, burning, tightness), limiting instrumental ADL (hands, elbows, backs of calves)  Patient Coping     Distress Assessment  Distress Assessment Score: No distress  Accompanied by  Accompanied by: Family Member    Past History  Past  Medical History:   Diagnosis Date     Allergic rhinitis      Breast cancer      Breast cyst     left breast for many years     Chronic sinus infection     has not had any recent recurrences.     Eczema      Environmental allergies     dust mites.     GERD (gastroesophageal reflux disease)      H/O acute pancreatitis 2005     Hx of radiation therapy 2015    right breast lumpectomy     Hypothyroidism      Infectious mononucleosis 1971     Migraine     premenstrual migraines. Not any more.     Nephrolithiasis      Obstructive sleep apnea     uses CPAP     Osteoarthritis          Past Surgical History:   Procedure Laterality Date     BREAST CYST ASPIRATION Left     benign many years ago     FINGER SURGERY Right     2nd right finger d/t laceration of tendon     LASIK Bilateral 2005     ND MASTECTOMY, PARTIAL Right 9/28/2015    Procedure: RIGHT LUMPECTOMY WIRE LOCALIZATION;  Surgeon: Raeann Wall MD;  Location: [facility];  Service: General     SENTINEL LYMPH NODE BIOPSY Right 9/28/2015           Physical Exam    Recent Vitals 12/13/2017   Height -   Weight 264 lbs 8 oz   BSA (m2) -   /68   Pulse 87   Temp 97.5   Temp src 1   SpO2 97   Some recent data might be hidden       GENERAL: Alert and oriented. Seated comfortably. In no distress.  Heavyset.  A bit anxious today.  Otherwise looks well.    HEAD: Atraumatic and normocephalic.  Has a full head of hair.    EYES: SHYAM, EOMI.  No pallor.  No icterus.    Oral cavity: no mucosal lesion or tonsillar enlargement.    NECK: supple. JVP normal.  No thyroid enlargement.    LYMPH NODES: No palpable, cervical, axillary or inguinal lymphadenopathy.    CHEST: clear to auscultation bilaterally.  Resonant to percussion throughout bilaterally.  Symmetrical breath movements bilaterally.    CVS: S1 and S2 are heard. Regular rate and rhythm.  No murmur or gallop or rub heard.    ABDOMEN: Soft. Not tender. Not distended.  No palpable hepatomegaly or splenomegaly.  No  other mass palpable.  Bowel sounds heard.    EXTREMITIES: Warm.  No peripheral edema.    SKIN: no rash, or bruising or purpura.    BREASTS:  Right breast: The skin looks normal.  The surgical scar is healed well.  There is no underlying induration palpable.  No mass palpable in the right breast.  The nipple looks normal.  Right axilla is without mass or nodule.    Left breast: The contour looks normal.  The skin looks normal.  There is no palpable mass.  The nipple looks normal.  No mass or nodule in the left axilla.    Lab Results    No results found for this or any previous visit (from the past 168 hour(s)).    Imaging    No results found.      Signed by: Laila Young MD

## 2021-06-14 NOTE — PROGRESS NOTES
[facility] Hematology and Oncology Progress Note    Patient: Nikki Michelle  MRN: [id number]  Date of Service: 01/20/2021      Reason for Visit    Follow-up regarding breast cancer.    Assessment and Plan  Breast cancer of upper-outer quadrant of right female breast    Primary site: Breast (Right)    Staging method: AJCC 7th Edition    Pathologic: Stage IB (T1b, N1mi, cM0) signed by Laila Young MD on 11/13/2015 12:54 PM    Summary: Stage IB (T1b, N1mi, cM0)    Prognostic indicators:             ESTROGEN RECEPTOR: POSITIVE (95% OF CELLS) PROGESTERONE RECEPTOR: POSITIVE       (80% OF CELLS) HER2/ESTUARDO: NEGATIVE (STAINING PATTERN 0 OUT OF 3)       ECOG Performance   ECOG Performance Status: 0    Distress Assessment  Distress Assessment Score: 2:  Pain   : No pain.    Ms. Nikki Michelle is a 70 y.o. 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    The carcinoma was 9 mm in size and there were micrometastases in the sentinel lymph node.  It was ER/FL positive and HER-2 negative.  Oncotype DX score was 10.    It was decided not to go for adjuvant chemotherapy.  She completed adjuvant radiation on 1/22/16 and started adjuvant Arimidex on 1/23/16.    She has osteoporosis.  She had episodes of iritis with Reclast infusions.  So she decided to stop Reclast.  She changed from Arimidex to tamoxifen on 12/13/17 on account of the osteoporosis.    1.  From the breast cancer point of view she is doing very well.  At this point we are not seeing any evidence of cancer recurrence.  She had her annual screening mammogram done on 10/1/2020.  I reviewed the report with her.  There were no suspicious findings.  She was happy to see that.  She has now completed 5 years of being on estrogen blockade therapy.  Initially she was on Arimidex and after that when she was found to have osteoporosis, she changed over to  tamoxifen.  We discussed about continued estrogen blockade.  She had a 9 mm, stage Ib breast cancer and the Oncotype DX score was only 10.  Thus she is not a person who is at particularly high risk of recurrence.  She has had the problems with osteoporosis also.  I think taking everything into consideration she can stop the tamoxifen today.  She was very happy to hear that.    2.  We then discussed about the long-term follow-up for the breast cancer.  She would like to continue to come in for an annual visit with us.  She would like us to continue doing the annual mammograms for her.  I have ordered the mammogram for this year to be done.  It can be done sometime in October.  Return to clinic for a physical exam in 1 year's time.    3.  She wanted the survivorship care plan again.  I printed out for her and to give to her today.    4.  Age-related osteoporosis: She has still not wanted to start the Prolia after she had the iritis episode with Reclast.  She states that she has not talked to her dentist and since she has had root canal work done in the past, she is felt to be at some risk for osteonecrosis of the jaw.  We discussed about the other things that she can do to improve the osteoporosis.  Recommended to continue with weightbearing exercise.  Recommended to continue with calcium and vitamin D.  I recommended to her that she should have a follow-up bone density scan done in 2022 since she did have one done in September 2020.    5.  Obesity continues to be a problem.  She is very aware of it.  She is going to try to lose weight with nutritional changes and exercise.  She is already following with a weight loss clinic.  She considers a gastric banding surgery as a backup if these approaches fail.    6.  She was given the contact information for the [facility] website which has been established for COVID-19 vaccination for the general public.  She does not have any contraindication for the  vaccine.    7.  Follow-up: Return to clinic with MD or NP in a year's time.    Time spend >30 minutes total time for the patient.         Problem List    1. Malignant neoplasm of upper-outer quadrant of right breast in female, estrogen receptor positive (H)  Mammo Screening Bilateral   2. Class 3 drug-induced obesity with serious comorbidity and body mass index (BMI) of 40.0 to 44.9 in adult (H)     3. Use of tamoxifen (Nolvadex)     4. Age-related osteoporosis without current pathological fracture     5. Encounter for screening mammogram for malignant neoplasm of breast   Mammo Screening Bilateral        CC: Urmila Meneses MD     ______________________________________________________________________________    History of Present Illness    Ms. Nikki Michelle is here for follow-up alone.    She states that she is doing well overall.  She really has no new complaints.  She has not noticed any lumps or bumps anywhere.  No aches or pains.  She states that she is still taking tamoxifen and tolerating it well.    She tells me that she was trying to lose weight.  She wanted to get started on a new exercise regimen and ordered an elliptical machine.  That is before Thanksgiving.  It has not been delivered yet.  She is hoping that it will come soon and she can get started.  She did consider having stomach banding done to lose weight.  She then decided to give another try with nutrition and exercise to lose weight.  She is now on a program called [facility].  Hoping that will work.    She and her  have been remaining safe at home with the COVID-19 pandemic.  Neither of them have been sick.  She is interested in having the COVID-19 vaccine.    No fevers or night sweats.  No lumps or bumps anywhere.  No aches or pains.  No unusual headaches.  No eyesight problems.  Breathing is fine.  No chest pain or cough.  No nausea or vomiting or abdominal pain.  No constipation or diarrhea.  No blood in stool or black stools.   No skin rashes.  No vaginal bleeding.  No numbness or tingling.    Please see below.  A 14 point review of system is otherwise completely negative.      Pain Status  Currently in Pain: No/denies    Review of Systems    Constitutional  Constitutional (WDL): Exceptions to WDL  Weight Loss: to <10% from baseline, intervention not indicated(2Lb)  Neurosensory  Neurosensory (WDL): All neurosensory elements are within defined limits  Cardiovascular  Cardiovascular (WDL): All cardiovascular elements are within defined limits  Pulmonary  Respiratory (WDL): Exceptions to WDL  Cough: None  Dyspnea: Shortness of breath with moderate exertion(due to deconditioning)  Gastrointestinal  Gastrointestinal (WDL): All gastrointestinal elements are within defined limits  Genitourinary  Genitourinary (WDL): All genitourinary elements are within defined limits  Integumentary  Integumentary (WDL): All integumentary elements are within defined limits  Patient Coping  Patient Coping: Accepting;Open/discussion  Distress Assessment  Distress Assessment Score: 2  Accompanied by  Accompanied by: Alone    Past History  Past Medical History:   Diagnosis Date     Allergic rhinitis      Breast cancer (H) 2015    right breast     Breast cyst     left breast for many years     Candidal intertrigo 4/26/2018     Chronic sinus infection     has not had any recent recurrences.     Eczema      Environmental allergies     dust mites.     GERD (gastroesophageal reflux disease)      H/O acute pancreatitis 2005     Hx of radiation therapy 2015    right breast lumpectomy     Hypothyroidism      Infectious mononucleosis 1971     Migraine     premenstrual migraines. Not any more.     Nephrolithiasis      Obstructive sleep apnea     uses CPAP     Osteoarthritis          Past Surgical History:   Procedure Laterality Date     BREAST CYST ASPIRATION Left     benign many years ago     FINGER SURGERY Right     2nd right finger d/t laceration of tendon     LASIK  "Bilateral 2005     MA MASTECTOMY, PARTIAL Right 9/28/2015    Procedure: RIGHT LUMPECTOMY WIRE LOCALIZATION;  Surgeon: Raeann Wall MD;  Location: [facility] [place];  Service: General     SENTINEL LYMPH NODE BIOPSY Right 9/28/2015           Physical Exam    Recent Vitals 1/20/2021   Height -   Weight 255 lbs 3 oz   BSA (m2) 2.26 m2   /88   Pulse 92   Temp 98.3   Temp src 1   SpO2 97   Some recent data might be hidden     Estimated body mass index is 45.93 kg/m  as calculated from the following:    Height as of 10/23/19: 5' 2.5\" (1.588 m).    Weight as of this encounter: 255 lb 3.2 oz (115.8 kg).        GENERAL: Alert and oriented to time place and person. Seated comfortably. In no distress.  She looks well overall.  Heavyset.  Very pleasant.    HEAD: Atraumatic and normocephalic.    EYES: SHYAM, EOMI.  No pallor.  No icterus.    Oral cavity: no mucosal lesion or tonsillar enlargement.    NECK: supple. JVP normal.  No thyroid enlargement.    LYMPH NODES: No palpable, cervical, axillary or inguinal lymphadenopathy.    CHEST: clear to auscultation bilaterally.  Resonant to percussion throughout bilaterally.  Symmetrical breath movements bilaterally.    CVS: S1 and S2 are heard. Regular rate and rhythm.  No murmur or gallop or rub heard.  No peripheral edema.    ABDOMEN: Soft. Not tender. Not distended.  Abdominal obesity.  No palpable hepatomegaly or splenomegaly.  No other mass palpable.  Bowel sounds heard.    EXTREMITIES: Warm.    SPINE: No deformity.  No tenderness.    SKIN: no rash, or bruising or purpura.  Has a full head of hair.    BREASTS:  Right breast: The skin looks normal.  The surgical scar has healed very well.  No palpable mass in the right breast.  The nipple looks normal.  The right axilla is without mass or nodule.    Left breast: Contour looks normal.  Skin looks normal.  There is no palpable mass.  The nipple looks normal.  No mass or nodule in the left axilla.    Lab Results    No results " found for this or any previous visit (from the past 168 hour(s)).    Imaging    No results found.      Signed by: Laila Young MD

## 2021-06-14 NOTE — PATIENT INSTRUCTIONS - HE
Nikki, Here is the address for COVID connection:  Https://[place].gov/covid19/vaccine/find-vaccine/index.jsp  [phone]

## 2021-06-16 PROBLEM — G47.39 OTHER SLEEP APNEA: Status: ACTIVE | Noted: 2018-04-26

## 2021-06-16 PROBLEM — E03.9 HYPOTHYROIDISM: Status: ACTIVE | Noted: 2020-03-13

## 2021-06-16 PROBLEM — R51.9 HEADACHE: Status: ACTIVE | Noted: 2020-03-13

## 2021-06-16 PROBLEM — K80.20 CALCULUS OF GALLBLADDER: Status: ACTIVE | Noted: 2020-03-13

## 2021-06-16 PROBLEM — R73.03 PREDIABETES: Status: ACTIVE | Noted: 2018-04-26

## 2021-06-16 PROBLEM — F50.819 BINGE EATING DISORDER: Chronic | Status: ACTIVE | Noted: 2018-04-26

## 2021-06-16 PROBLEM — M19.90 OSTEOARTHRITIS: Status: ACTIVE | Noted: 2018-04-26

## 2021-06-16 PROBLEM — K21.9 ESOPHAGEAL REFLUX: Status: ACTIVE | Noted: 2020-03-13

## 2021-06-16 PROBLEM — Z79.810 USE OF TAMOXIFEN (NOLVADEX): Status: ACTIVE | Noted: 2018-06-20

## 2021-06-16 PROBLEM — E66.813 CLASS 3 DRUG-INDUCED OBESITY WITH SERIOUS COMORBIDITY AND BODY MASS INDEX (BMI) OF 40.0 TO 44.9 IN ADULT (H): Status: ACTIVE | Noted: 2018-03-21

## 2021-06-16 PROBLEM — J30.9 ALLERGIC RHINITIS: Status: ACTIVE | Noted: 2020-03-13

## 2021-06-16 PROBLEM — Z87.442 HISTORY OF KIDNEY STONES: Status: ACTIVE | Noted: 2018-04-26

## 2021-06-16 PROBLEM — E66.1 CLASS 3 DRUG-INDUCED OBESITY WITH SERIOUS COMORBIDITY AND BODY MASS INDEX (BMI) OF 40.0 TO 44.9 IN ADULT (H): Status: ACTIVE | Noted: 2018-03-21

## 2021-06-16 PROBLEM — E78.2 MIXED HYPERLIPIDEMIA: Status: ACTIVE | Noted: 2018-06-26

## 2021-06-16 NOTE — PROGRESS NOTES
Patient is here for three month follow-up  Encounter Diagnoses   Name Primary?     Malignant neoplasm of upper-outer quadrant of right breast in female, estrogen receptor positive Yes     Age-related osteoporosis without current pathological fracture      Obesity due to excess calories without serious comorbidity      Ingrown tami Lewis RN

## 2021-06-16 NOTE — PROGRESS NOTES
[facility] Hematology and Oncology Progress Note    Patient: Nikki Michelle  MRN: [id number]  Date of Service: 03/21/2018      Reason for Visit    Follow-up regarding breast cancer.    Assessment and Plan  Breast cancer of upper-outer quadrant of right female breast    Primary site: Breast (Right)    Staging method: AJCC 7th Edition    Pathologic: Stage IB (T1b, N1mi, cM0) signed by Laila Young MD on 11/13/2015 12:54 PM    Summary: Stage IB (T1b, N1mi, cM0)    Prognostic indicators:             ESTROGEN RECEPTOR: POSITIVE (95% OF CELLS) PROGESTERONE RECEPTOR: POSITIVE       (80% OF CELLS) HER2/ESTUARDO: NEGATIVE (STAINING PATTERN 0 OUT OF 3)       ECOG Performance   ECOG Performance Status: 0    Distress Assessment  Distress Assessment Score: No distress: No intervention is needed today.    Pain   : No pain now.    Ms. Nikki Michelle is a 67 y.o. 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  The carcinoma was 9 mm in size and there were micrometastases in the sentinel lymph node.  It was ER/WV positive and HER-2 negative.  Oncotype DX score was 10.  It was decided not to go for adjuvant chemotherapy.  She completed adjuvant radiation on 1/22/16 and started adjuvant Arimidex on 1/23/16.  She had a mammogram done on 9/6/16 where they felt that there is a mass at the 3 o'clock position in the left breast.  She had an ultrasound done the next day and these appeared to be benign cysts.  We will continue with yearly mammogram.  She had episodes of iritis with Reclast infusions.  So she decided to stop Reclast.  She changed from Arimidex to tamoxifen on 12/13/17 on account of the osteoporosis.    1.  She feels that she is tolerating tamoxifen well.  She has occasional hot flashes but they are less troublesome than earlier.  No other side effects.  Her blood counts and metabolic panel were checked  today.  They are both essentially normal.  She was glad to see that.  She will continue with tamoxifen.  She will call when she needs a new refill.    2.  She says that she felt a vague pain in the right breast for a few weeks and then the pain disappeared.  On physical examination today I do not feel anything at all in the right breast.  I asked her to keep an eye on that area.  If she feels anything new she should call me and we can go for imaging of the right breast.  She voiced understanding.    3.  She is gaining weight in spite of trying to lose weight with exercise and diet.  I think she is now morbidly obese.  This will likely lead to problems or her overall health down the line.  She was agreeable to meeting with the weight management clinic and I am making a referral.  Hopefully they will be able to help her lose weight.    4.  She has an ingrown toenail on the fourth toe on the right side.  I am referring her to podiatry.  I discussed with her about how to cut her nails in the future so that this does not happen.    5.  She will need a follow-up mammogram done in September 2018.  This can be a bilateral screening mammogram if she does not have any new symptom.    6.  To prevent osteoporosis, hopefully tamoxifen will be helpful.  She is to continue with calcium and vitamin D.  Encouraged her to exercise.  We will plan on checking a DEXA scan at the end of this year.    7.  Follow-up: Return to clinic with me or Pato Chen NP in 3 months.    Time spend >25 minutes face to face with the patient. More than 50 % in counseling and coordination of care.    Problem List    1. Malignant neoplasm of upper-outer quadrant of right breast in female, estrogen receptor positive     2. Age-related osteoporosis without current pathological fracture     3. Obesity due to excess calories without serious comorbidity  Ambulatory referral for Weight Management: Kelly   4. Ingrown toenail  Ambulatory referral to  Podiatry        CC: Urmila Meneses MD     ______________________________________________________________________________    History of Present Illness    Ms. Nikki Michelle is here for follow-up alone.  She says that overall she feels that she is doing okay.  She is taking tamoxifen daily and she feels that she is tolerating it well.  She does have some hot flashes now and then but they are not as intense as what she had when she was on anastrozole.  She says that she has no body aches or pains.    She has a few new issues today.  She says that a few weeks ago she had a week pain inside the right breast.  It was a sharp pain.  It lasted intermittently for about 3 weeks and then it disappeared all by itself.  She does not remember any trauma to the right breast area.  She has not felt any lump or bump or any skin changes.    Another issue she has noted is that she has a slightly painful nail fold in the right fourth toe.  I asked her about how she cuts her nails and she uses some sort of a clipper.    She has also been gaining weight.  She says that she is trying to lose weight by exercising twice a day and also limiting calorie intake but she has not been successful in losing weight yet.    Otherwise no lumps or bumps elsewhere.  No new aches or pains elsewhere.  No unusual headaches.  No eyesight problems.  No mouth sores.  No swallowing difficulty.  No shortness of breath.  No chest pain or cough.  No nausea or vomiting or abdominal pain.  No constipation or diarrhea.  No blood in stool or black stools.  No vaginal bleeding.  No difficulty passing urine.  No numbness or tingling in hands or feet.    Please see below.  A 14 point review of system is otherwise completely negative.      Pain Status  Currently in Pain: No/denies    Review of Systems    Constitutional  Constitutional (WDL): Exceptions to WDL  Fatigue: None  Fever: None  Chills: None  Weight Gain: 5 - <10% from baseline (10 # in six  months)  Weight Loss: None  Neurosensory  Neurosensory (WDL): Exceptions to WDL  Peripheral Motor Neuropathy: None  Ataxia: None  Peripheral Sensory Neuropathy: None  Confusion: None  Syncope: None  Cardiovascular  Cardiovascular (WDL): All cardiovascular elements are within defined limits  Pulmonary  Respiratory (WDL): Within Defined Limits (hx sinusitis)  Gastrointestinal  Gastrointestinal (WDL): All gastrointestinal elements are within defined limits  Genitourinary  Genitourinary (WDL): All genitourinary elements are within defined limits  Integumentary  Integumentary (WDL): All integumentary elements are within defined limits  Patient Coping  Patient Coping: Accepting  Distress Assessment  Distress Assessment Score: No distress  Accompanied by  Accompanied by: Alone    Past History  Past Medical History:   Diagnosis Date     Allergic rhinitis      Breast cancer      Breast cyst     left breast for many years     Chronic sinus infection     has not had any recent recurrences.     Eczema      Environmental allergies     dust mites.     GERD (gastroesophageal reflux disease)      H/O acute pancreatitis 2005     Hx of radiation therapy 2015    right breast lumpectomy     Hypothyroidism      Infectious mononucleosis 1971     Migraine     premenstrual migraines. Not any more.     Nephrolithiasis      Obstructive sleep apnea     uses CPAP     Osteoarthritis          Past Surgical History:   Procedure Laterality Date     BREAST CYST ASPIRATION Left     benign many years ago     FINGER SURGERY Right     2nd right finger d/t laceration of tendon     LASIK Bilateral 2005     MA MASTECTOMY, PARTIAL Right 9/28/2015    Procedure: RIGHT LUMPECTOMY WIRE LOCALIZATION;  Surgeon: Raeann Wall MD;  Location: [facility];  Service: General     SENTINEL LYMPH NODE BIOPSY Right 9/28/2015           Physical Exam    Recent Vitals 3/21/2018   Height -   Weight 267 lbs   BSA (m2) -   /64   Pulse 88   Temp 98.2   Temp src 1    SpO2 94   Some recent data might be hidden       GENERAL: Alert and oriented. Seated comfortably. In no distress.  She looks well but appears to have gained some weight.    HEAD: Atraumatic and normocephalic.  Has a full head of hair.    EYES: SHYAM, EOMI.  No pallor.  No icterus.    Oral cavity: no mucosal lesion or tonsillar enlargement.    NECK: supple. JVP normal.  No thyroid enlargement.    LYMPH NODES: No palpable, cervical, axillary or inguinal lymphadenopathy.    CHEST: clear to auscultation bilaterally.  Resonant to percussion throughout bilaterally.  Symmetrical breath movements bilaterally.    CVS: S1 and S2 are heard. Regular rate and rhythm.  No murmur or gallop or rub heard.    ABDOMEN: Soft. Not tender. Not distended.  No palpable hepatomegaly or splenomegaly.  No other mass palpable.  Bowel sounds heard.    EXTREMITIES: Warm.  No peripheral edema.    SKIN: no rash, or bruising or purpura.    She has an ingrown toenail on the fourth toe on the right side.  However the area does not look infected or inflamed.    BREASTS:  Right breast: The skin looks normal.  The surgical scar is healed well.  There is no induration or mass palpable.  No tenderness.  The nipple looks normal.  The right axilla is without mass or nodule.    Left breast: The contour looks normal.  The skin looks normal.  There is no palpable mass.  The nipple looks normal.  No mass or nodule in the left axilla.    Lab Results    Recent Results (from the past 168 hour(s))   Comprehensive Metabolic Panel   Result Value Ref Range    Sodium 141 136 - 145 mmol/L    Potassium 4.2 3.5 - 5.0 mmol/L    Chloride 111 (H) 98 - 107 mmol/L    CO2 20 (L) 22 - 31 mmol/L    Anion Gap, Calculation 10 5 - 18 mmol/L    Glucose 126 (H) 70 - 125 mg/dL    BUN 17 8 - 22 mg/dL    Creatinine 1.03 0.60 - 1.10 mg/dL    GFR MDRD Af Amer >60 >60 mL/min/1.73m2    GFR MDRD Non Af Amer 53 (L) >60 mL/min/1.73m2    Bilirubin, Total 0.3 0.0 - 1.0 mg/dL    Calcium 9.0 8.5  - 10.5 mg/dL    Protein, Total 6.5 6.0 - 8.0 g/dL    Albumin 3.2 (L) 3.5 - 5.0 g/dL    Alkaline Phosphatase 78 45 - 120 U/L    AST 22 0 - 40 U/L    ALT 21 0 - 45 U/L   HM1 (CBC with Diff)   Result Value Ref Range    WBC 6.6 4.0 - 11.0 thou/uL    RBC 4.40 3.80 - 5.40 mill/uL    Hemoglobin 12.3 12.0 - 16.0 g/dL    Hematocrit 38.8 35.0 - 47.0 %    MCV 88 80 - 100 fL    MCH 28.0 27.0 - 34.0 pg    MCHC 31.7 (L) 32.0 - 36.0 g/dL    RDW 15.3 (H) 11.0 - 14.5 %    Platelets 259 140 - 440 thou/uL    MPV 9.7 8.5 - 12.5 fL    Neutrophils % 64 50 - 70 %    Lymphocytes % 27 20 - 40 %    Monocytes % 7 2 - 10 %    Eosinophils % 2 0 - 6 %    Basophils % 0 0 - 2 %    Neutrophils Absolute 4.2 2.0 - 7.7 thou/uL    Lymphocytes Absolute 1.8 0.8 - 4.4 thou/uL    Monocytes Absolute 0.5 0.0 - 0.9 thou/uL    Eosinophils Absolute 0.1 0.0 - 0.4 thou/uL    Basophils Absolute 0.0 0.0 - 0.2 thou/uL       Imaging    No results found.      Signed by: Laila Young MD

## 2021-06-17 NOTE — PROGRESS NOTES
"ASSESSMENT: Ingrown right fourth toenail at the lateral border    PLAN: We reviewed options for moisturizing the nail edge and managing the associated callus.  She will try that for a few months.  We could remove the toenail, but she hopes to avoid that.  Return to clinic as needed.      SUBJECTIVE: New patient visit at the [facility] regarding pain along the lateral edge of the right fourth toenail.  She has had episodes of pain and swelling several times over the past few years.  Never bleeding or pus.  Pain response to absent salt foot soaks.  Often a small callus next to the nail peels off, and the toe feels better.  She is not aware of any trauma to the toe.  Other nails do not do this.    PHYSICAL EXAM:  /88  Pulse 88  Resp 16  Ht 5' 2.5\" (1.588 m)  Wt (!) 267 lb (121.1 kg)  SpO2 100%  BMI 48.06 kg/m2  General: Pleasant 67 y.o. female in no acute distress.  Vascular: DP pulses are palpable. PT pulses are palpable. Pedal hair is present. Feet are warm to the touch.  Cardiac: Pulse is regular.  Lymphatic: No edema at the ankles.  Neuro: Sensation in the feet is grossly intact to light touch.  Derm: The lateral edge of the right fourth toenail is mildly incurved.  Some adjacent hyperkeratosis.  With debridement, there is no open wound or pus.  Musculoskeletal: Adequate passive range of motion in foot and ankle joints.    Past Medical History:   Diagnosis Date     Allergic rhinitis      Breast cancer      Breast cyst      Chronic sinus infection      Eczema      Environmental allergies      GERD (gastroesophageal reflux disease)      H/O acute pancreatitis 2005     Hx of radiation therapy 2015     Hypothyroidism      Infectious mononucleosis 1971     Migraine      Nephrolithiasis      Obstructive sleep apnea      Osteoarthritis        She has a past surgical history that includes LASIK (Bilateral, 2005); Finger surgery (Right); [place] lymph node biopsy (Right, 9/28/2015); Breast cyst aspiration " "(Left); and pr mastectomy, partial (Right, 9/28/2015).    Allergies   Allergen Reactions     Formaldehyde Analogues Rash     Penicillins Hives and Swelling     Throat swelling, has tolerated Keflex in past      Aleve [Naproxen Sodium] Rash     \"Liquid capsules only\"     Codeine Nausea And Vomiting     Preservative Rash     Tramadol Nausea And Vomiting       Current Outpatient Prescriptions   Medication Sig Dispense Refill     acetaminophen (TYLENOL) 325 MG tablet Take 650 mg by mouth every 6 (six) hours as needed for pain.       aspirin 81 MG EC tablet Take 81 mg by mouth daily.       b complex vitamins tablet Take 1 tablet by mouth daily.       cholecalciferol, vitamin D3, 5,000 unit Tab Take 5,000 Units by mouth.       docoshexanoic acid-eicosapent 500 mg (FISH OIL) 500-100 mg cap capsule Take 500 mg by mouth daily.       EPINEPHrine (EPIPEN) 0.3 mg/0.3 mL (1:1,000) atIn Inject into the shoulder, thigh, or buttocks once. PRN for allergic reaction       fexofenadine (ALLEGRA) 180 MG tablet Take 180 mg by mouth daily.       levothyroxine (SYNTHROID, LEVOTHROID) 100 MCG tablet Take 1 tablet by mouth every morning before breakfast.       naproxen (NAPROSYN) 500 MG tablet Take 1 tablet by mouth daily. As needed       omeprazole 20 mg TbEC Take 20 mg by mouth.       triamcinolone (KENALOG) 0.1 % ointment Apply 1 application topically 2 (two) times a day as needed.        vitamin E 400 UNIT capsule Take 400 Units by mouth daily.       tamoxifen (NOLVADEX) 20 MG tablet Take 1 tablet (20 mg total) by mouth daily. 90 tablet 3     No current facility-administered medications for this visit.        Family History:  family history includes Diabetes in her brother; Heart disease in her father; Heart disease (age of onset: 70) in her brother; Hypertension in her daughter and son; Stroke in her mother.    Social History:  Reviewed, and she reports that she quit smoking about 42 years ago. She has a 2.50 pack-year smoking history. " She has never used smokeless tobacco. She reports that she does not drink alcohol or use illicit drugs.    Review of Systems:  A 12 point comprehensive review of systems was negative except as noted.
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
## 2021-06-18 NOTE — PROGRESS NOTES
[facility] Hematology and Oncology Progress Note    Patient: Nikki Michelle  MRN: [id number]  Date of Service: 06/20/2018      Reason for Visit    Follow-up regarding breast cancer.    Assessment and Plan  Breast cancer of upper-outer quadrant of right female breast    Primary site: Breast (Right)    Staging method: AJCC 7th Edition    Pathologic: Stage IB (T1b, N1mi, cM0) signed by Laila Young MD on 11/13/2015 12:54 PM    Summary: Stage IB (T1b, N1mi, cM0)    Prognostic indicators:             ESTROGEN RECEPTOR: POSITIVE (95% OF CELLS) PROGESTERONE RECEPTOR: POSITIVE       (80% OF CELLS) HER2/ESTUARDO: NEGATIVE (STAINING PATTERN 0 OUT OF 3)       ECOG Performance   ECOG Performance Status: 0    Distress Assessment  Distress Assessment Score: 2: No intervention is needed today.    Pain   : No pain now.    Ms. Nikki Michelle is a 68 y.o. 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  The carcinoma was 9 mm in size and there were micrometastases in the sentinel lymph node.  It was ER/NE positive and HER-2 negative.  Oncotype DX score was 10.  It was decided not to go for adjuvant chemotherapy.  She completed adjuvant radiation on 1/22/16 and started adjuvant Arimidex on 1/23/16.  She had a mammogram done on 9/6/16 where they felt that there is a mass at the 3 o'clock position in the left breast.  She had an ultrasound done the next day and these appeared to be benign cysts.  We will continue with yearly mammogram.  She had episodes of iritis with Reclast infusions.  So she decided to stop Reclast.  She changed from Arimidex to tamoxifen on 12/13/17 on account of the osteoporosis.    1.  She appears to be doing well overall.  No suspicious finding on physical examination and no suspicious symptoms.  She has an overall feeling of well-being as she is losing weight.  I congratulated her on the  progress that she has made.  She tells me that she is very happy with the weight loss clinic.  I encouraged her to continue working with them and to continue with exercises etc. to keep her good health going.  She is tolerating tamoxifen well.  She is taking it daily.  She believes she has refills left.  She will call when she needs a new prescription.    2.  She will need her annual mammogram done in September.  I have ordered that to be scheduled.    3.  To follow-up on the osteoporosis she will need a DEXA scan done at the end of this year.  I have ordered at that 2.  She will schedule it according to her convenience.    4.  Return to clinic with me or Pato Chen NP in 3 months after the mammogram.  If she is doing well, we can consider starting spacing out the appointments to 6 months at that point.    Time spend >25 minutes face to face with the patient. More than 50 % in counseling and coordination of care.    Problem List    1. Malignant neoplasm of upper-outer quadrant of right breast in female, estrogen receptor positive (H)  Mammo Screening Bilateral    DXA Bone Density Scan    DXA Bone Density Scan   2. Use of tamoxifen (Nolvadex)  DXA Bone Density Scan    DXA Bone Density Scan   3. Encounter for screening mammogram for malignant neoplasm of breast   Mammo Screening Bilateral   4. Age-related osteoporosis without current pathological fracture  DXA Bone Density Scan    DXA Bone Density Scan        CC: Urmila Meneses MD     ______________________________________________________________________________    History of Present Illness    Ms. Nikki Michelle he is here for follow-up alone.  She has been working with the weight loss clinic and she has lost 14 pounds of weight in the last 6 months.  She feels very good about that.  Overall she has a sense of well-being as she loses weight.  She and her  have also started working out in a gym.  They used to go to work at 4 AM before their  residential.  They still continue with the same schedule after residential.  So the usually go to the gym at around 1.30 or 2 AM.  She says that she is feeling a little bit sleepy today because of that schedule.  She intends to go back and take a nap.    She is also very busy because they are making an extension to the house.  She anticipates being quite busy in the next 3 months while to complete that.    She has not noticed any lumps or bumps anywhere.  No new aches or pains.  She says that a couple weeks ago she did have some achiness in the left leg but that went away by itself.  No fevers or night sweats.  No difficulty breathing.  No chest pain or cough.  No nausea or vomiting or abdominal pain.  No constipation and no diarrhea.  No blood in stool or black stools.  No difficulty passing urine.  No vaginal bleeding.  He still has the eczema lesions of her skin.  They come and go.  No numbness or tingling in hands or feet.    Please see below.  A 14 point review of system is otherwise completely negative.      Pain Status  Currently in Pain: No/denies    Review of Systems    Constitutional  Constitutional (WDL): Exceptions to WDL  Fatigue: Fatigue relieved by rest (d/t exercise in night)  Fever: None  Chills: None  Weight Gain: None  Weight Loss: to <10% from baseline, intervention not indicated (12 lbs since 4/18)  Neurosensory  Neurosensory (WDL): All neurosensory elements are within defined limits  Cardiovascular  Cardiovascular (WDL): All cardiovascular elements are within defined limits  Pulmonary  Respiratory (WDL): Within Defined Limits  Gastrointestinal  Gastrointestinal (WDL): All gastrointestinal elements are within defined limits  Genitourinary  Genitourinary (WDL): All genitourinary elements are within defined limits  Integumentary  Integumentary (WDL): Exceptions to WDL  Alopecia: None  Rash Maculo-Papular: Macules/papules covering <10% BSA with or without symptoms (e.g., pruritus, burning, tightness)  "(arms, backs of legs)  Pruritus: Mild or localized, topical intervention indicated  Urticaria: None  Palmar-Plantar Erythrodysesthesia Syndrome: None  Flushing: None  Patient Coping  Patient Coping: Accepting  Distress Assessment  Distress Assessment Score: 2  Accompanied by  Accompanied by: Alone    Past History  Past Medical History:   Diagnosis Date     Allergic rhinitis      Breast cancer (H)      Breast cyst     left breast for many years     Chronic sinus infection     has not had any recent recurrences.     Eczema      Environmental allergies     dust mites.     GERD (gastroesophageal reflux disease)      H/O acute pancreatitis 2005     Hx of radiation therapy 2015    right breast lumpectomy     Hypothyroidism      Infectious mononucleosis 1971     Migraine     premenstrual migraines. Not any more.     Nephrolithiasis      Obstructive sleep apnea     uses CPAP     Osteoarthritis          Past Surgical History:   Procedure Laterality Date     BREAST CYST ASPIRATION Left     benign many years ago     FINGER SURGERY Right     2nd right finger d/t laceration of tendon     LASIK Bilateral 2005     TN MASTECTOMY, PARTIAL Right 9/28/2015    Procedure: RIGHT LUMPECTOMY WIRE LOCALIZATION;  Surgeon: Raeann Wall MD;  Location: [facility] [place];  Service: General     SENTINEL LYMPH NODE BIOPSY Right 9/28/2015           Physical Exam    Recent Vitals 6/20/2018   Height 5' 2.5\"   Weight 253 lbs 6 oz   BSA (m2) 2.25 m2   /80   Pulse 101   Temp -   Temp src -   SpO2 96   Some recent data might be hidden       GENERAL: Alert and oriented. Seated comfortably. In no distress.  She looks well overall.  She still obese but has certainly lost weight compared to the last time I saw her.    HEAD: Atraumatic and normocephalic.  Has a full head of hair.    EYES: SHYAM, EOMI.  No pallor.  No icterus.    Oral cavity: no mucosal lesion or tonsillar enlargement.    NECK: supple. JVP normal.  No thyroid enlargement.    LYMPH " NODES: No palpable, cervical, axillary or inguinal lymphadenopathy.    CHEST: clear to auscultation bilaterally.  Resonant to percussion throughout bilaterally.  Symmetrical breath movements bilaterally.    CVS: S1 and S2 are heard. Regular rate and rhythm.  No murmur or gallop or rub heard.    ABDOMEN: Soft. Not tender. Not distended.  Obese.  No palpable hepatomegaly or splenomegaly.  No other mass palpable.  Bowel sounds heard.    EXTREMITIES: Warm.  No peripheral edema.    SKIN: no bruising or purpura.  Eczema lesions on her arm.    BREASTS:  Right breast: The skin looks normal.  The surgical scar is healed well.  There is no induration or mass palpable.  No tenderness.  The nipple looks normal.  The right axilla is without mass or nodule.    Left breast: The contour looks normal.  The skin looks normal.  There is no palpable mass.  The nipple looks normal.  No mass or nodule in the left axilla.    Lab Results    No results found for this or any previous visit (from the past 168 hour(s)).    Imaging    No results found.      Signed by: Laila Young MD

## 2021-06-18 NOTE — PROGRESS NOTES
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 with multiple weight related comorbid conditions including breast cancer, prediabetes, osteoarthritis, sleep apnea, and binge eating disorder.      It is believed that her breast cancer treatment drugs have caused her weight disorder to escalate.  She was referred to me by Dr. Laila Young of oncology hematology for this reason.    She mentioned on 5/29/2018 that the low back pain that she had been experiencing recently resolved with a 9 pound weight loss.  She is no longer having back pain and is able to work in her garden for several hours a day.    Initial Weight: 265 lbs - bmi 47.7 4/26/2018  Weight: 256 lb (116.1 kg) bmi 46.08, 5/29/2018   Weight loss from initial: 9  % Weight loss: 3.4 %    Are you experiencing any side effects to the medications:  No  METFORMIN 500 MG PO Q DAY - prediabetes.  4/26/2018 - 5/29/2018 - LOST 9 LBS THE FIRST MONTH     Hunger control:  good  Exercise was discussed: working in her gardens.   Taking supplements:   Fish oil (not taking consistently), vitamin D, multivitamin, 5 hydroxytryptophan, chromium  Discussed journaling food:  Yes. She had two episodes of bingeing in the past 5 weeks.   Patient is pleased with the current results:  yes  The patient is following the nutrition plan:  Did the diet for the first 2-3 weeks but she then fell off a little.   Barriers to losing weight:  Metabolism:   IR      PLAN  Follow up in 1 month.  Continue medications. metformin  Continue supplements.  This month concentrate on   Her goals - eat smaller portions and choose better foods.                 Chief Complaint   Patient presents with     Weight Loss        HPI  Nikki Michelle is a 67 y.o. female tells me that she is noticed that since she lost the weight over  the last month her chronic back pain has gone away completely.  She is actually been able to work in her garden for several hours a day.    It sounds like she had 2 episodes over the month where she got diarrhea and she said it was definitely linked to excessive intake of carbohydrate.        History   Smoking Status     Former Smoker     Packs/day: 0.50     Years: 5.00     Quit date: 9/22/1975   Smokeless Tobacco     Never Used      Current Outpatient Prescriptions on File Prior to Visit   Medication Sig Dispense Refill     acetaminophen (TYLENOL) 325 MG tablet Take 650 mg by mouth every 6 (six) hours as needed for pain.       aspirin 81 MG EC tablet Take 81 mg by mouth daily.       b complex vitamins tablet Take 1 tablet by mouth daily.       cholecalciferol, vitamin D3, 5,000 unit Tab Take 5,000 Units by mouth.       docoshexanoic acid-eicosapent 500 mg (FISH OIL) 500-100 mg cap capsule Take 500 mg by mouth daily.       EPINEPHrine (EPIPEN) 0.3 mg/0.3 mL (1:1,000) atIn Inject into the shoulder, thigh, or buttocks once. PRN for allergic reaction       fexofenadine (ALLEGRA) 180 MG tablet Take 180 mg by mouth daily.       levothyroxine (SYNTHROID, LEVOTHROID) 100 MCG tablet Take 1 tablet by mouth every morning before breakfast.       metFORMIN (GLUCOPHAGE XR) 500 MG 24 hr tablet Take 1 tablet (500 mg total) by mouth daily with breakfast. 90 tablet 0     naproxen (NAPROSYN) 500 MG tablet Take 1 tablet by mouth daily. As needed       omeprazole 20 mg TbEC Take 20 mg by mouth.       triamcinolone (KENALOG) 0.1 % ointment Apply 1 application topically 2 (two) times a day as needed.        vitamin E 400 UNIT capsule Take 400 Units by mouth daily.       tamoxifen (NOLVADEX) 20 MG tablet Take 1 tablet (20 mg total) by mouth daily. 90 tablet 3     No current facility-administered medications on file prior to visit.       Allergies   Allergen Reactions     Formaldehyde Analogues Rash     Penicillins Hives and Swelling      "Throat swelling, has tolerated Keflex in past      Aleve [Naproxen Sodium] Rash     \"Liquid capsules only\"     Codeine Nausea And Vomiting     Preservative Rash     Tramadol Nausea And Vomiting         Review of Systems   Constitutional: Negative.    HENT: Negative.    Eyes: Negative.    Respiratory: Negative.    Cardiovascular: Negative.    Gastrointestinal: Negative.    Endocrine: Negative.    Genitourinary: Negative.    Musculoskeletal: Negative.    Skin: Negative.    Neurological: Negative.    Hematological: Negative.    Psychiatric/Behavioral: Negative.         OBJECTIVE: /84  Pulse 76  Resp 14  Ht 5' 2.5\" (1.588 m)  Wt (!) 256 lb (116.1 kg)  Breastfeeding? No  BMI 46.08 kg/m2   Physical Exam   Constitutional: She is oriented to person, place, and time. She appears well-developed and well-nourished. No distress.   HENT:   Head: Normocephalic and atraumatic.   Eyes: Conjunctivae are normal.   Neck: Neck supple.   Cardiovascular: Normal rate and regular rhythm.    Pulmonary/Chest: Effort normal.   Musculoskeletal: Normal range of motion.   Neurological: She is alert and oriented to person, place, and time.   Skin: Skin is warm and dry.   Psychiatric: She has a normal mood and affect.        This note was created using Dragon dictation.  Please excuse any grammatical errors.    "

## 2021-06-18 NOTE — PROGRESS NOTES
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 with multiple weight related comorbid conditions including breast cancer, prediabetes, osteoarthritis, sleep apnea, and binge eating disorder.        Initial Weight: 265 lbs - bmi 47.7 4/26/2018  Weight: 256 lb (116.1 kg) bmi 46.08, 5/29/2018   Weight: 254 lb (115.2 kg) Body mass index is 45.72 kg/(m^2).  6/26/2018   Weight loss from initial: 11  % Weight loss: 4.15 %       Are you experiencing any side effects to the medications:  No  METFORMIN 500 MG PO Q DAY - prediabetes.  4/26/2018 - 6/26/2018  - LOST 11 LBS THE FIRST two months     Hunger control:  good  Exercise was discussed: working in her gardens. She does straw bail gardening.   Taking supplements:   Fish oil (not taking consistently), vitamin D, multivitamin, 5 hydroxytryptophan, chromium  Discussed journaling food:  Yes. She had two episodes of bingeing in the past 5 weeks.   Patient is pleased with the current results:  yes  The patient is following the nutrition plan:  choosing foods that are not recommended quite frequently.    Barriers to losing weight:  Metabolism:   IR        PLAN  Follow up in 1 month.  Continue medications. metformin  Continue supplements.  This month concentrate on   Her goals - meet with dietician, to help her change food choices.   She mentions if she is sewing or knitting she can be all day without eating because she loves that.             Unprioritized    Mixed hyperlipidemia     lipids - reviewed today  ldl is a little high. Recommend discuss w pcp     Lab Results   Component Value Date    CHOL 214 (H) 04/26/2018     Lab Results   Component Value Date    HDL 55 04/26/2018     Lab Results   Component Value Date    LDLCALC 132 (H) 04/26/2018     Lab Results   Component Value Date     TRIG 137 04/26/2018     No components found for: CHOLHDL                 Chief Complaint   Patient presents with     Weight Loss        HPI  Nikki Michelle is a 68 y.o. female says she has had a really stressful month this month because her  had 2 surgeries due to kidney stones.  She is also spent a lot of time gardening and finds that that is therapeutic for her.  She knows when she sews or nits she does not think of food.  She has been eating a lot of carbohydrates this past month and feels that that has contributed to the decline in her weight loss weight.    History   Smoking Status     Former Smoker     Packs/day: 0.50     Years: 5.00     Quit date: 9/22/1975   Smokeless Tobacco     Never Used      Current Outpatient Prescriptions on File Prior to Visit   Medication Sig Dispense Refill     acetaminophen (TYLENOL) 325 MG tablet Take 650 mg by mouth every 6 (six) hours as needed for pain.       aspirin 81 MG EC tablet Take 81 mg by mouth daily.       b complex vitamins tablet Take 1 tablet by mouth daily.       cholecalciferol, vitamin D3, 5,000 unit Tab Take 5,000 Units by mouth.       docoshexanoic acid-eicosapent 500 mg (FISH OIL) 500-100 mg cap capsule Take 500 mg by mouth daily.       EPINEPHrine (EPIPEN) 0.3 mg/0.3 mL (1:1,000) atIn Inject into the shoulder, thigh, or buttocks once. PRN for allergic reaction       fexofenadine (ALLEGRA) 180 MG tablet Take 180 mg by mouth daily.       levothyroxine (SYNTHROID, LEVOTHROID) 100 MCG tablet Take 1 tablet by mouth every morning before breakfast.       metFORMIN (GLUCOPHAGE XR) 500 MG 24 hr tablet Take 1 tablet (500 mg total) by mouth daily with breakfast. 90 tablet 0     naproxen (NAPROSYN) 500 MG tablet Take 1 tablet by mouth daily. As needed       NON FORMULARY        NON FORMULARY        omeprazole (PRILOSEC) 20 MG capsule TK 1 C PO QD AC  3     omeprazole 20 mg TbEC Take 20 mg by mouth.       triamcinolone (KENALOG) 0.1 % ointment Apply 1 application  "topically 2 (two) times a day as needed.        vitamin E 400 UNIT capsule Take 400 Units by mouth daily.       tamoxifen (NOLVADEX) 20 MG tablet Take 1 tablet (20 mg total) by mouth daily. 90 tablet 3     No current facility-administered medications on file prior to visit.       Allergies   Allergen Reactions     Formaldehyde Analogues Rash     Penicillins Hives and Swelling     Throat swelling, has tolerated Keflex in past      Aleve [Naproxen Sodium] Rash     \"Liquid capsules only\"     Codeine Nausea And Vomiting     Preservative Rash     Tramadol Nausea And Vomiting     Review of Systems   Constitutional: Negative.    HENT: Negative.    Eyes: Negative.    Respiratory: Negative.    Cardiovascular: Negative.    Gastrointestinal: Negative.    Endocrine: Negative.    Genitourinary: Negative.    Musculoskeletal: Negative.    Skin: Negative.    Neurological: Negative.    Hematological: Negative.    Psychiatric/Behavioral: Negative.         OBJECTIVE: /70  Pulse 72  Resp 14  Ht 5' 2.5\" (1.588 m)  Wt (!) 254 lb (115.2 kg)  BMI 45.72 kg/m2   Physical Exam   Constitutional: She is oriented to person, place, and time. She appears well-developed and well-nourished. No distress.   HENT:   Head: Normocephalic and atraumatic.   Eyes: Conjunctivae are normal.   Neck: Neck supple.   Cardiovascular: Normal rate and regular rhythm.    Pulmonary/Chest: Effort normal.   Musculoskeletal: Normal range of motion.   Neurological: She is alert and oriented to person, place, and time.   Skin: Skin is warm and dry.   Psychiatric: She has a normal mood and affect.        This note was created using Dragon dictation.  Please excuse any grammatical errors.    "

## 2021-06-19 NOTE — PROGRESS NOTES
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 with multiple weight related comorbid conditions including breast cancer, prediabetes, osteoarthritis, sleep apnea, and binge eating disorder.        Initial Weight: 265 lbs - bmi 47.7 4/26/2018  Weight: 256 lb (116.1 kg) bmi 46.08, 5/29/2018   Weight: 254 lb (115.2 kg) Body mass index is 45.72 kg/(m^2).  6/26/2018   Weight: 248 lb (112.5 kg) bmi 44.64, 7/31/2018   Weight loss from initial: 17 down an additional 6 lbs from last month.  % Weight loss: 6.42 %         Are you experiencing any side effects to the medications:  No  METFORMIN 500 MG PO Q DAY - prediabetes.  4/26/2018 - 7/31/2018  - LOST 17 LBS THE FIRST three months      Hunger control:  good  Exercise was discussed: working in her gardens. She does straw bail gardening.   Taking supplements:   Fish oil (not taking consistently), vitamin D, multivitamin, 5 hydroxytryptophan, chromium  Discussed journaling food:  Yes.  Patient is pleased with the current results:  yes  The patient is following the nutrition plan: she is keeping a bag of cut veggies in fridge and this is helping her choose healthy snacks.  Barriers to losing weight:  Metabolism:   IR          PLAN  Follow up in 1 month.  Continue medications. metformin  Continue supplements.  This month concentrate on   Her goals - meet with dietician, to help her change food choices.   She mentions if she is sewing or knitting she can be all day without eating because she loves that.     Dx:BMI 47.7 likely caused by chemotherapeutic agents used for breast cancer treatment. with multiple weight related comorbid conditions including breast cancer, prediabetes, osteoarthritis, sleep apnea, and binge eating disorder.        Initial Weight: 265 lbs - bmi 47.7  4/26/2018  Weight: 256 lb (116.1 kg) bmi 46.08, 5/29/2018   Weight: 254 lb (115.2 kg) Body mass index is 45.72 kg/(m^2).  6/26/2018   Weight: 248 lb (112.5 kg) bmi 44.64, 7/31/2018   Weight loss from initial: 17 (down additional 6 lb since 6/2/2018)  % Weight loss: 6.42 %      Are you experiencing any side effects to the medications:  No  METFORMIN 500 MG PO Q DAY - prediabetes.  4/26/2018 - 7/31/2018   - LOST 17 LBS THE FIRST three months      Hunger control:  good  Exercise was discussed: working in her gardens. She does straw bail gardening.   Taking supplements:   Fish oil (not taking consistently), vitamin D, multivitamin, 5 hydroxytryptophan, chromium  Discussed journaling food:  Yes. She had two episodes of bingeing in the past 5 weeks.   Patient is pleased with the current results:  yes  The patient is following the nutrition plan:  choosing foods that are not recommended quite frequently.    Barriers to losing weight:  Metabolism:   IR.  Also gets up 11pm -2 am to exercise because does not want other people to see her at the gym. This will lilkey interfere w healthy sleep pattern         PLAN  Follow up in 1 month.  Continue medications. metformin  Continue supplements.  This month concentrate on  Her goals - meet with dietician, to help her change food choices.  But she has been really busy and so she has not set this up yet.    She mentions if she is sewing or knitting she can be all day without eating because she loves that.  declined to schedule when gi called re colonoscopy -and today tells me that she has been dealing with some kidney stones so right now is not a good time.  Discussed healthy sleep patterns and advised against exercising during normal sleeping hours.         Prediabetes    Relevant Medications    metFORMIN (GLUCOPHAGE XR) 500 MG 24 hr tablet    Other Relevant Orders    Glycosylated Hemoglobin A1c      Other Visit Diagnoses     Encounter for therapeutic drug monitoring        Relevant  Orders    Comprehensive Metabolic Panel           Chief Complaint   Patient presents with     Weight Loss        HPI  Nikki Michelle is a 68 y.o. female comes in for follow-up of her weight loss today.  She says she has not been able to schedule with the dietitian because she has been really busy.  She has been dealing with kidney stones recently.  She tells me she is also been exercising a lot lately.  She likes to exercise between 11 PM and 2 AM because she says that she does not like to exercise on the gym is busy and she and her  both like to be there alone.  Her oncologist has expressed some concern with this as it will interfere with her circadian rhythm.  However, her oncologist is happy with her weight loss.  Lately she has been keeping cut up vegetables in her refrigerator and going to that for snacking so that has really helped her stay away from her usual carb laden snacks.     History   Smoking Status     Former Smoker     Packs/day: 0.50     Years: 5.00     Quit date: 9/22/1975   Smokeless Tobacco     Never Used      Current Outpatient Prescriptions on File Prior to Visit   Medication Sig Dispense Refill     acetaminophen (TYLENOL) 325 MG tablet Take 650 mg by mouth every 6 (six) hours as needed for pain.       aspirin 81 MG EC tablet Take 81 mg by mouth daily.       b complex vitamins tablet Take 1 tablet by mouth daily.       cholecalciferol, vitamin D3, 5,000 unit Tab Take 5,000 Units by mouth.       docoshexanoic acid-eicosapent 500 mg (FISH OIL) 500-100 mg cap capsule Take 500 mg by mouth daily.       EPINEPHrine (EPIPEN) 0.3 mg/0.3 mL (1:1,000) atIn Inject into the shoulder, thigh, or buttocks once. PRN for allergic reaction       fexofenadine (ALLEGRA) 180 MG tablet Take 180 mg by mouth daily.       levothyroxine (SYNTHROID, LEVOTHROID) 100 MCG tablet Take 1 tablet by mouth every morning before breakfast.       naproxen (NAPROSYN) 500 MG tablet Take 1 tablet by mouth daily. As needed    "    NON FORMULARY        NON FORMULARY        omeprazole (PRILOSEC) 20 MG capsule TK 1 C PO QD AC  3     omeprazole 20 mg TbEC Take 20 mg by mouth.       triamcinolone (KENALOG) 0.1 % ointment Apply 1 application topically 2 (two) times a day as needed.        vitamin E 400 UNIT capsule Take 400 Units by mouth daily.       [DISCONTINUED] metFORMIN (GLUCOPHAGE XR) 500 MG 24 hr tablet Take 1 tablet (500 mg total) by mouth daily with breakfast. 90 tablet 0     tamoxifen (NOLVADEX) 20 MG tablet Take 1 tablet (20 mg total) by mouth daily. 90 tablet 3     No current facility-administered medications on file prior to visit.       Allergies   Allergen Reactions     Formaldehyde Analogues Rash     Penicillins Hives and Swelling     Throat swelling, has tolerated Keflex in past      Aleve [Naproxen Sodium] Rash     \"Liquid capsules only\"     Codeine Nausea And Vomiting     Preservative Rash     Tramadol Nausea And Vomiting       Review of Systems   Constitutional: Negative.    HENT: Negative.    Eyes: Negative.    Respiratory: Negative.    Cardiovascular: Negative.    Gastrointestinal: Negative.    Endocrine: Negative.    Genitourinary: Negative.    Musculoskeletal: Negative.    Skin: Negative.    Neurological: Negative.    Hematological: Negative.    Psychiatric/Behavioral: Negative.         OBJECTIVE: /70  Pulse 80  Resp 16  Ht 5' 2.5\" (1.588 m)  Wt (!) 248 lb (112.5 kg)  Breastfeeding? No  BMI 44.64 kg/m2   Physical Exam   Constitutional: She is oriented to person, place, and time. She appears well-developed and well-nourished. No distress.   HENT:   Head: Normocephalic and atraumatic.   Eyes: Conjunctivae are normal.   Neck: Neck supple.   Cardiovascular: Normal rate and regular rhythm.    Pulmonary/Chest: Effort normal.   Musculoskeletal: Normal range of motion.   Neurological: She is alert and oriented to person, place, and time.   Skin: Skin is warm and dry.   Psychiatric: She has a normal mood and affect. "        This note was created using Dragon dictation.  Please excuse any grammatical errors.

## 2021-06-20 NOTE — PROGRESS NOTES
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 with multiple weight related comorbid conditions including breast cancer, prediabetes, osteoarthritis, sleep apnea, and binge eating disorder.        Initial Weight: 265 lbs - bmi 47.7 4/26/2018  Weight: 256 lb (116.1 kg) bmi 46.08, 5/29/2018   Weight: 254 lb (115.2 kg) Body mass index is 45.72 kg/(m^2).  6/26/2018   Weight: 248 lb (112.5 kg) bmi 44.64, 7/31/2018   Weight: 248 lb (112.5 kg) bmi 44.64, 9/4/2018   Weight loss from initial: 17  % Weight loss: 6.42 %        Are you experiencing any side effects to the medications:  No    Medication notes:   4/30/18 - intake.  4/26/18 IR evidenced by a1c 5.9, fast blood sugar 126 and waist circumfrence 50 in.    METFORMIN 500 MG PO Q DAY - prediabetes.  4/26/2018 - 9/4/2018   - LOST 17 LBS     Vyvanse?   Could consider since intake indicates she binge eats.    NAC - consider if needed for compulsive eating    CONTRAVE   I also thought about Contrave for her.  She was pretty emotional during her intake visit and tearful at times.  If it seems she has some depression ongoing during future visits she may benefit from Wellbutrin or Contrave.        Hunger control:  good  Exercise was discussed: working in her gardens. She does straw bail gardening.   Taking supplements:   Fish oil (not taking consistently), vitamin D, multivitamin, 5 hydroxytryptophan, chromium  Discussed journaling food:  Yes. She had two episodes of bingeing in the past 5 weeks.   Patient is pleased with the current results:  yes  The patient is following the nutrition plan:  choosing foods that are not recommended quite frequently.    Barriers to losing weight:  Metabolism:   IR.  Also gets up 11pm -2 am to exercise because does not want other people to see her at the  gym. This will aidan barber w healthy sleep pattern         PLAN  MAINTENANCE PHASE.  Discussed CAP plan today.   Less than or = to 250 - control  251-253 - action   Greater than or equal to 254 - panic - needs immediate follow up    Continue medications. metformin  Continue supplements.Fish oil (not taking consistently), vitamin D, multivitamin, 5 hydroxytryptophan, chromium    Her goals - meet with dietician, to help her change food choices.  But she has been really busy and so she has not set this up yet.      Future discussion topics:   Intermittent fasting.   She mentions if she is sewing or knitting she can be all day without eating because she loves that.  healthy sleep patterns and advised against exercising during normal sleeping hours.  Could consider NAC  In the future.          Prediabetes     Recommend recheck a1c and creatinine to ensure risk and benefit of metformin still make sense.          Relevant Orders    Glycosylated Hemoglobin A1c    Creatinine           Chief Complaint   Patient presents with     Weight Loss        HPI  Nikki Michelle is a 68 y.o. female  Here for weight loss follow up. Was recently vacationing in the [facility] and was able to maintain her weight.  She says she is feeling physically stronger and mentally more able.  She did plan on doing in addition to her house and had hoped that she would be completely done with that project by now.  Unfortunately they are actually just starting the project now.  She feels like she has been putting her weight loss goals on hold for the remodeling project but has decided that she cannot do that anymore.  She is feeling motivated and feels like she is going to be able to start losing weight again soon.  She would like to meet with the dietitian and has not yet done that.    History   Smoking Status     Former Smoker     Packs/day: 0.50     Years: 5.00     Quit date: 9/22/1975   Smokeless Tobacco     Never Used      Current Outpatient  "Prescriptions on File Prior to Visit   Medication Sig Dispense Refill     acetaminophen (TYLENOL) 325 MG tablet Take 650 mg by mouth every 6 (six) hours as needed for pain.       aspirin 81 MG EC tablet Take 81 mg by mouth daily.       b complex vitamins tablet Take 1 tablet by mouth daily.       cholecalciferol, vitamin D3, 5,000 unit Tab Take 5,000 Units by mouth.       docoshexanoic acid-eicosapent 500 mg (FISH OIL) 500-100 mg cap capsule Take 500 mg by mouth daily.       EPINEPHrine (EPIPEN) 0.3 mg/0.3 mL (1:1,000) atIn Inject into the shoulder, thigh, or buttocks once. PRN for allergic reaction       fexofenadine (ALLEGRA) 180 MG tablet Take 180 mg by mouth daily.       levothyroxine (SYNTHROID, LEVOTHROID) 100 MCG tablet Take 1 tablet by mouth every morning before breakfast.       metFORMIN (GLUCOPHAGE XR) 500 MG 24 hr tablet Take 1 tablet (500 mg total) by mouth daily with breakfast. 90 tablet 0     naproxen (NAPROSYN) 500 MG tablet Take 1 tablet by mouth daily. As needed       NON FORMULARY        NON FORMULARY        omeprazole (PRILOSEC) 20 MG capsule TK 1 C PO QD AC  3     omeprazole 20 mg TbEC Take 20 mg by mouth.       triamcinolone (KENALOG) 0.1 % ointment Apply 1 application topically 2 (two) times a day as needed.        vitamin E 400 UNIT capsule Take 400 Units by mouth daily.       tamoxifen (NOLVADEX) 20 MG tablet Take 1 tablet (20 mg total) by mouth daily. 90 tablet 3     No current facility-administered medications on file prior to visit.       Allergies   Allergen Reactions     Formaldehyde Analogues Rash     Penicillins Hives and Swelling     Throat swelling, has tolerated Keflex in past      Aleve [Naproxen Sodium] Rash     \"Liquid capsules only\"     Codeine Nausea And Vomiting     Preservative Rash     Tramadol Nausea And Vomiting       Review of Systems   Constitutional: Negative.    HENT: Negative.    Eyes: Negative.    Respiratory: Negative.    Cardiovascular: Negative.  " "  Gastrointestinal: Negative.    Endocrine: Negative.    Genitourinary: Negative.    Musculoskeletal: Negative.    Skin: Negative.    Neurological: Negative.    Hematological: Negative.    Psychiatric/Behavioral: Negative.         OBJECTIVE: /82  Pulse 80  Resp 16  Ht 5' 2.5\" (1.588 m)  Wt (!) 248 lb (112.5 kg)  Breastfeeding? No  BMI 44.64 kg/m2   Physical Exam   Constitutional: She is oriented to person, place, and time. She appears well-developed and well-nourished. No distress.   HENT:   Head: Normocephalic and atraumatic.   Eyes: Conjunctivae are normal.   Neck: Neck supple.   Cardiovascular: Normal rate and regular rhythm.    Pulmonary/Chest: Effort normal.   Musculoskeletal: Normal range of motion.   Neurological: She is alert and oriented to person, place, and time.   Skin: Skin is warm and dry.   Psychiatric: She has a normal mood and affect.      Orders Placed This Encounter   Procedures     Glycosylated Hemoglobin A1c     Creatinine        This note was created using Dragon dictation.  Please excuse any grammatical errors.    "

## 2021-06-20 NOTE — PROGRESS NOTES
[facility] Hematology and Oncology Progress Note    Patient: Nikki Michelle  MRN: [id number]  Date of Service: 10/3/2018       Reason for Visit:    Scheduled follow-up.    Assessment and Plan:    1) Breast cancer UOQ female (R) breast     Pathologic Stage IB (T1b, N1mi, cM0)      [place] - positive(95% OF CELLS)    [place] - positive (80% OF CELLS)    HER2/ESTUARDO - negative (STAINING PATTERN 0 OUT OF 3)     68 y.o.     09/28/18 - (R) partial mastectomy and sentinel lymph node biopsy:    9 mm, grade 2 invasive ductal carcinoma.      Micrometastases in the sentinel lymph node.      Oncotype DX score was 10.      It was decided not to go for adjuvant chemotherapy.      01/22/16 - completed adjuvant breast conservation EBRT.    01/23/18 - started adjuvant endocrine therapy with Arimidex.    12/13/17 - changed from adjuvant Arimidex to tamoxifen therapy due to osteoporosis.    Stopped Reclast infusions due to episodes of iritis.    09/20/18 (B) screening mammogram - benign findings.    10/03/18:    The patient presents looking and feeling quite good other than chronic (L) lateral knee discomfort.  Their home is under construction as they are adding on several rooms.  Her weight is quite stable after losing about 20 pounds earlier in the year.  She is tolerating tamoxifen without apparent toxicities or side effects.      Clinically and radiographically FUNMI.    Tolerating adjuvant endocrine therapy with tamoxifen well.    Continue daily endocrine therapy with tamoxifen.    Continue self-breast examinations.    Encouraged her to continue working on weight loss and exercises.      Encouraged follow up with Ortho regarding her (L) knee discomfort.    04/03/19 - 6 month follow up with LFTs.    Annual mammogram in September.      2) Osteoporosis:    OFF aromatase inhibitor therapy ON tamoxifen.    09/24/18 NM DEXA compared to 10/13/16 imaging:    Osteopenia of the lumbar spine, mildly improved since the previous exam.    Osteopenia of the  bilateral total hips/femoral necks, slightly improved  since the previous exam.    Continue calcium 9197-1722 mg/day + vitamin D 4612-1233 iu/day.    Likely follow up DEXA fall 2020        TT > 25 minutes face to face with > 25% counseling and care coordination.    Pato Chen, CNP      CC: Urmila Meneses MD  ______________________________________________    Interim History:    The patient presents alone in follow-up.  She looks and feels quite good other than chronic (L) lateral knee discomfort that seems to be worsening over the past couple years.  Her weight is stable after about a 20 pound weight loss over the past year.  She and her  continue working out in a gym.  She remains busy adding an extension to their home.  She hopes it's done by the end of the year.  She practices self-breast examination and denies concerning findings.  She denies cough, fevers, chills or night sweats, difficulty breathing, nausea or vomiting, bowel or bladder issues, unusual headaches, visual or mentation disturbance.    ECOG Performance:     ECOG Performance Status: 0    Distress Assessment:    Distress Assessment Score: 1: No intervention is needed today.    Pain Status:    Currently in Pain: No/denies    Review of Systems:    Constitutional  Constitutional (WDL): All constitutional elements are within defined limits  Neurosensory  Neurosensory (WDL): All neurosensory elements are within defined limits  Cardiovascular  Cardiovascular (WDL): All cardiovascular elements are within defined limits  Pulmonary  Respiratory (WDL): Within Defined Limits  Gastrointestinal  Gastrointestinal (WDL): All gastrointestinal elements are within defined limits  Genitourinary  Genitourinary (WDL): All genitourinary elements are within defined limits  Integumentary  Integumentary (WDL): All integumentary elements are within defined limits  Patient Coping  Patient Coping: Accepting  Distress Assessment  Distress Assessment Score:  "1  Accompanied by  Accompanied by: Alone    Past Histories:    Past Medical History:   Diagnosis Date     Allergic rhinitis      Breast cancer (H)      Breast cyst     left breast for many years     Chronic sinus infection     has not had any recent recurrences.     Eczema      Environmental allergies     dust mites.     GERD (gastroesophageal reflux disease)      H/O acute pancreatitis 2005     Hx of radiation therapy 2015    right breast lumpectomy     Hypothyroidism      Infectious mononucleosis 1971     Migraine     premenstrual migraines. Not any more.     Nephrolithiasis      Obstructive sleep apnea     uses CPAP     Osteoarthritis          Past Surgical History:   Procedure Laterality Date     BREAST CYST ASPIRATION Left     benign many years ago     FINGER SURGERY Right     2nd right finger d/t laceration of tendon     LASIK Bilateral 2005     VT MASTECTOMY, PARTIAL Right 9/28/2015    Procedure: RIGHT LUMPECTOMY WIRE LOCALIZATION;  Surgeon: Raeann Wall MD;  Location: [facility];  Service: General     SENTINEL LYMPH NODE BIOPSY Right 9/28/2015     Physical Exam:    Recent Vitals 10/3/2018   Height 5' 2.5\"   Weight 249 lbs 2 oz   BSA (m2) 2.23 m2   /72   Pulse 91   Temp 97.6   Temp src 1   SpO2 98   Some recent data might be hidden       GENERAL:   Alert and oriented.  Very pleasant.  Comfortable.  In no distress.  Obese.    HEENT:   Full head of hair. SUSANNE.  EOMI.  No pallor.  No icterus.  No mucosal lesions.    LYMPH NODES:  No palpable cervical, axillary or inguinal lymphadenopathy.    CHEST:   Lungs clear.    CVS:    S1 and S2 heard.  Regular rate and rhythm.  No murmur, gallop or rub heard.    ABDOMEN:   Soft.  Not tender.  Not distended.   Obese.  No palpable organomegaly, masses or ascites.    EXTREMITIES:  Warm.  No peripheral edema.    SKIN:    No bruising or purpura.      BREASTS:  Right - skin looks normal.  Surgical scar has healed well.  There is no induration or mass palpable.  " No tenderness.  The nipple looks normal.  The right axilla is without mass or nodule.     Left - contour and skin look appear normal.  No palpable mass.  The nipple looks normal.  No mass or nodule in the left axilla.     Patient declined offer for female  during exam.    Lab Results:    No results found for this or any previous visit (from the past 24 hour(s)).    Imaging:    Reviewed with patient.    Mammo Screening Bilateral    Result Date: 9/20/2018  BILATERAL FULL FIELD DIGITAL SCREENING MAMMOGRAM Performed on: 9/20/18 Compared to: 09/18/2017 Mammo Screening Bilateral, and 09/06/2016 Mammo Diagnostic Bilateral Findings: The breasts are almost entirely fatty. There are postsurgical lumpectomy changes in the right breast. No evidence for malignancy and no change from the previous exam(s). This study was evaluated with the assistance of Computer-Aided Detection. Continue routine screening mammogram as recommended. ACR BI-RADS Category 2: Benign Findings     [facility]  BONE MINERAL DENSITY EXAM  9/24/2018 2:40 PM     INDICATION: D. osteoporosis (must be documented by previous exam) - 733.00  Osteoporosis, Unspecified Osteoporosis Type, Unspecified Pathological Fracture  Presence  COMPARISON: Bone density scan 10/13/2016    FINDINGS:  Lumbar Spine L1-L4 BMD: 1.047 versus 0.946 previously g/cm sq; T-score -1.1  versus -1.9 previously; Z-score 0.5 versus -0.3 previously  WHO classification: Osteopenia    Mean Bilateral Total Hip BMD: 0.870 versus 0.855 previously g/cm sq; T-score  -1.1 versus -1.2 previously; Z-score 0.3 versus 0.0 previously  WHO classification: Osteopenia    Mean Bilateral Femoral Neck BMD: 0.739 versus 0.691 previously g/cm sq; T-score  -2.2 versus -2.5 previously; Z-score -0.5 versus -1.0 previously  WHO classification: Osteopenia    WHO criteria:  Normal: T score at or above -1 SD  Osteopenia: T score between -1 and -2.5 SD  Osteoporosis: T score at or below -2.5 SD    FRAX  Results:  10-year probability of major osteoporotic fracture is 27.3%, and of hip fracture  is 5.9%, based on left femoral neck BMD.    RECOMMENDATIONS:  Treat if major osteoporotic fracture score greater than or equal to 20%. Treat  if hip fracture score greater than or equal to 3%.    CONCLUSION:  1.  Osteopenia of the lumbar spine, mildly improved since the previous exam.  2.  Osteopenia of the bilateral total hips/femoral necks, slightly improved  since the previous exam.   Other Result Information   [employer], [employer] - 09/24/2018  3:02 PM CDT  [facility]  BONE MINERAL DENSITY EXAM  9/24/2018 2:40 PM     INDICATION: D. osteoporosis (must be documented by previous exam) - 733.00  Osteoporosis, Unspecified Osteoporosis Type, Unspecified Pathological Fracture  Presence  COMPARISON: Bone density scan 10/13/2016    FINDINGS:  Lumbar Spine L1-L4 BMD: 1.047 versus 0.946 previously g/cm sq; T-score -1.1  versus -1.9 previously; Z-score 0.5 versus -0.3 previously  WHO classification: Osteopenia    Mean Bilateral Total Hip BMD: 0.870 versus 0.855 previously g/cm sq; T-score  -1.1 versus -1.2 previously; Z-score 0.3 versus 0.0 previously  WHO classification: Osteopenia    Mean Bilateral Femoral Neck BMD: 0.739 versus 0.691 previously g/cm sq; T-score  -2.2 versus -2.5 previously; Z-score -0.5 versus -1.0 previously  WHO classification: Osteopenia    WHO criteria:  Normal: T score at or above -1 SD  Osteopenia: T score between -1 and -2.5 SD  Osteoporosis: T score at or below -2.5 SD    FRAX Results:  10-year probability of major osteoporotic fracture is 27.3%, and of hip fracture  is 5.9%, based on left femoral neck BMD.    RECOMMENDATIONS:  Treat if major osteoporotic fracture score greater than or equal to 20%. Treat  if hip fracture score greater than or equal to 3%.    CONCLUSION:  1.  Osteopenia of the lumbar spine, mildly improved since the previous exam.  2.  Osteopenia of the bilateral total hips/femoral  necks, slightly improved  since the previous exam.

## 2021-06-20 NOTE — LETTER
Letter by Laila Young MD at      Author: Laila Young MD Service: -- Author Type: --    Filed:  Encounter Date: 3/31/2020 Status: (Other)                   Office Hours: Monday - Friday 8:00 - 4:30PM    Nikki Michelle  [address]  [facility] [address]           March 31, 2020      Dear Nikki:    Due to the covid19 pandemic we have had to make some changes to our schedules. We are asking that you come in on 4/21/2020 for a lab appointment at 11am and then on 4/22/2020 at 11:15-11:30am you will be getting a phone call from Dr. Young about the results. If this does not work for you please call me back at [phone] and we can work something out.         Sincerely,      [facility]

## 2021-06-21 NOTE — PROGRESS NOTES
ASSESSMENT AND PLAN:   Problem List Items Addressed This Visit        High    Osteoporosis     Chart review today- see BMI in the problem list.  We did discuss the impact of weight loss on bone density and the importance of weightbearing exercise to avoid reduction in bone density.  A bone density scan should be done again in September 2019.         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 with multiple weight related comorbid conditions including breast cancer, prediabetes (worsening despite weight loss as of 11/1/2018), osteoarthritis, sleep apnea, and binge eating disorder.        Initial Weight: 265 lbs - bmi 47.7 4/26/2018  Weight: 256 lb (116.1 kg) bmi 46.08, 5/29/2018   Weight: 254 lb (115.2 kg) Body mass index is 45.72 kg/(m^2).  6/26/2018   Weight: 248 lb (112.5 kg) bmi 44.64, 7/31/2018   Weight: 248 lb (112.5 kg) bmi 44.64, 9/4/2018   Weight: 246 lb (111.6 kg) bmi 44.28, 11/1/2018   Weight loss from initial: 19  % Weight loss: 7.17 %       Are you experiencing any side effects to the medications:  No     Medication notes:   4/30/18 - intake.  4/26/18 IR evidenced by a1c 5.9, fast blood sugar 126 and waist circumfrence 50 in.     METFORMIN 500 MG PO Q DAY - prediabetes.  4/26/2018 - 11/1/2018    - LOST 17 LBS  -  a1c worsening despite weight loss. 4/2018 a1c was 5.9 and weight was 265 lbs, 9/2018 a1c was 6.3 and weight was 248 lbs.   11/1/2018 - metformin increased   METFORMIN XR 1000 MG PO Q DAY - prediabetes (worsening on metformin 500 xr)  11/1/2018 - start. Risks benefits discussed and need to monitor gfr in the setting of gfr in 50s was discussed and informed consent was given.  Alternative of using saxenda also discussed.      SAXENDA - recommended for weight loss and increasing a1c despite weight loss  11/1/2018 - discussed. (would need to  discontinue metformin).    Vyvanse?   Could consider since intake indicates she binge eats.     NAC - consider if needed for compulsive eating     CONTRAVE   I also thought about Contrave for her.  She was pretty emotional during her intake visit and tearful at times.  If it seems she has some depression ongoing during future visits she may benefit from Wellbutrin or Contrave.      Hunger control:  good  Exercise was discussed: working on remodeling her house.   Taking supplements:   Fish oil (not taking consistently), vitamin D, multivitamin, 5 hydroxytryptophan, chromium  Discussed journaling food:  Yes. She had two episodes of bingeing in the past 5 weeks.   Patient is pleased with the current results:  yes  The patient is following the nutrition plan:  choosing foods that are not recommended quite frequently.    Barriers to losing weight:  Metabolism:   IR.  erratic sleep pattern       PLAN  ACTIVE WEIGHT LOSS PHASE, HAVING DIFFICULTY/ STARTING TO PLATEAU    Discussed CAP plan today.   Less than or = to 248 - control  249-251 - action   Greater than or equal to 252 - panic - needs immediate follow up     Increase metformin off label for prediabetes (a1c worsening) plan recheck a1c and creatinine in 3 months, but if not better consider dc metformin and start saxenda).     Continue supplements.Fish oil (not taking consistently), vitamin D, multivitamin, 5 hydroxytryptophan, chromium    Goal: Wants to increase physical activity. Wants to be on her treadmill 30 min 5 days per week.     Think about getting to the gym.     dexa was discussed today. Reviewed through careeverywhere - we discussed that unless doing weight bearing exercise weight loss tends to have a definite negative impact on bone density. She is aware of the importance of this since her mother and aunt also suffered from osteoporosis.    FUTURE ORDERS: DEXA 9/2019. a1c and bmp 12/2018.  Repeat lipid 4/2019.     Return in about 1 month (around  12/1/2018) for weight loss.            Prediabetes - Primary     Hemoglobin A1c is worsening despite significant loss.  Metformin was increased today by milligram extended release to 1000 mg at least once daily.  BMI in the problem list.         Relevant Medications    metFORMIN (GLUCOPHAGE XR) 500 MG 24 hr tablet           Chief Complaint   Patient presents with     Weight Loss        HPI  Nikki Michelle is a 68 y.o. female Nikki tells me that her house is under construction.  She is physically doing a lot of the work herself.  She says she is painting, doing dreams and all the doors herself.  Her  has been trying to help her.  He will cook her eggs because he knows she wants to eat something healthy and protein rich.  In the past she is gone to the gym more than she is now and she notes that her time intentionally getting to the gym for weight lifting has decreased.    History   Smoking Status     Former Smoker     Packs/day: 0.50     Years: 5.00     Quit date: 9/22/1975   Smokeless Tobacco     Never Used      Current Outpatient Prescriptions on File Prior to Visit   Medication Sig Dispense Refill     acetaminophen (TYLENOL) 325 MG tablet Take 650 mg by mouth every 6 (six) hours as needed for pain.       aspirin 81 MG EC tablet Take 81 mg by mouth daily.       b complex vitamins tablet Take 1 tablet by mouth daily.       cholecalciferol, vitamin D3, 5,000 unit Tab Take 5,000 Units by mouth.       docoshexanoic acid-eicosapent 500 mg (FISH OIL) 500-100 mg cap capsule Take 500 mg by mouth daily.       EPINEPHrine (EPIPEN) 0.3 mg/0.3 mL (1:1,000) atIn Inject into the shoulder, thigh, or buttocks once. PRN for allergic reaction       fexofenadine (ALLEGRA) 180 MG tablet Take 180 mg by mouth daily.       levothyroxine (SYNTHROID, LEVOTHROID) 100 MCG tablet Take 1 tablet by mouth every morning before breakfast.       naproxen (NAPROSYN) 500 MG tablet Take 1 tablet by mouth daily. As needed       omeprazole  "(PRILOSEC) 20 MG capsule TK 1 C PO QD AC  3     triamcinolone (KENALOG) 0.1 % ointment Apply 1 application topically 2 (two) times a day as needed.        vitamin E 400 UNIT capsule Take 400 Units by mouth daily.       [DISCONTINUED] metFORMIN (GLUCOPHAGE XR) 500 MG 24 hr tablet Take 1 tablet (500 mg total) by mouth daily with breakfast. 90 tablet 0     tamoxifen (NOLVADEX) 20 MG tablet Take 1 tablet (20 mg total) by mouth daily. 90 tablet 3     No current facility-administered medications on file prior to visit.       Allergies   Allergen Reactions     Formaldehyde Analogues Rash     Penicillins Hives and Swelling     Throat swelling, has tolerated Keflex in past      Aleve [Naproxen Sodium] Rash     \"Liquid capsules only\"     Codeine Nausea And Vomiting     Preservative Rash     Tramadol Nausea And Vomiting       Review of Systems   Constitutional: Negative.    HENT: Negative.    Eyes: Negative.    Respiratory: Negative.    Cardiovascular: Negative.    Gastrointestinal: Negative.    Endocrine: Negative.    Genitourinary: Negative.    Musculoskeletal: Negative.    Skin: Negative.    Neurological: Negative.    Hematological: Negative.    Psychiatric/Behavioral: Negative.         OBJECTIVE: /70  Pulse 88  Resp 16  Ht 5' 2.5\" (1.588 m)  Wt (!) 246 lb (111.6 kg)  Breastfeeding? No  BMI 44.28 kg/m2   Physical Exam   Constitutional: She is oriented to person, place, and time. She appears well-developed and well-nourished. No distress.   HENT:   Head: Normocephalic and atraumatic.   Eyes: Conjunctivae are normal.   Neck: Neck supple.   Cardiovascular: Normal rate and regular rhythm.    Pulmonary/Chest: Effort normal.   Musculoskeletal: Normal range of motion.   Neurological: She is alert and oriented to person, place, and time.   Skin: Skin is warm and dry.   Psychiatric: She has a normal mood and affect.      dexa reviewed   Labs reviewed.     This note was created using Dragon dictation.  Please excuse any " grammatical errors.

## 2021-06-22 NOTE — PROGRESS NOTES
ASSESSMENT AND PLAN:     Problem List Items Addressed This Visit        High    Class 3 drug-induced obesity with serious comorbidity and body mass index (BMI) of 40.0 to 44.9 in adult (H)     Dx:BMI 47.7 likely caused by chemotherapeutic agents used for breast cancer treatment. with multiple weight related comorbid conditions including breast cancer, prediabetes (worsening despite weight loss as of 11/1/2018), osteoarthritis, sleep apnea, and binge eating disorder.        Initial Weight: 265 lbs - bmi 47.7 4/26/2018  Weight: 256 lb (116.1 kg) bmi 46.08, 5/29/2018   Weight: 254 lb (115.2 kg) Body mass index is 45.72 kg/(m^2).  6/26/2018   Weight: 248 lb (112.5 kg) bmi 44.64, 7/31/2018   Weight: 248 lb (112.5 kg) bmi 44.64, 9/4/2018   Weight: 246 lb (111.6 kg) bmi 44.28, 11/1/2018   Weight: (!) 242 lb (109.8 kg) bmi 43.56, 12/5/2018   Weight loss from initial: 23  % Weight loss: 8.68 %          Medication notes:   4/30/18 - intake.  4/26/18 IR evidenced by a1c 5.9, fast blood sugar 126 and waist circumfrence 50 in.     METFORMIN 500 MG PO Q DAY - prediabetes.  4/26/2018 - 11/1/2018    - LOST 17 LBS  -  a1c worsening despite weight loss. 4/2018 a1c was 5.9 and weight was 265 lbs, 9/2018 a1c was 6.3 and weight was 248 lbs.   11/1/2018 - metformin increased   METFORMIN XR 1000 MG PO Q DAY - prediabetes (worsening on metformin 500 xr)  11/1/2018 - 12/5/2018  Risks benefits discussed and need to monitor gfr in the setting of gfr in 50s was discussed and informed consent was given.  Alternative of using saxenda also discussed.      SAXENDA - recommended for weight loss and increasing a1c despite weight loss  11/1/2018 - discussed. (would need to discontinue metformin).     Vyvanse?   Could consider since intake indicates she binge eats.     NAC - consider if needed for compulsive eating     CONTRAVE   I also thought about Contrave for her.  She was pretty emotional during her intake visit and tearful at times.  If it seems  "she has some depression ongoing during future visits she may benefit from Wellbutrin or Contrave.         PLAN  ACTIVE WEIGHT LOSS PHASE     CAP plan reset today with lower weight.   Less than or = to 244 - control  244-247 - action   Greater than or equal to 248 - panic - needs immediate follow up     Continue Metformin at increased dose,  off label for prediabetes (a1c worsening), plan recheck a1c and creatinine in 3 months, but if not better consider dc metformin and start saxenda).      Continue supplements.Fish oil (not taking consistently), vitamin D, multivitamin, 5 hydroxytryptophan, chromium     Goal: continue to be on her treadmill 30 min 5 days per week     FUTURE ORDERS: DEXA 9/2019. a1c and bmp 2/2019 (when she has been on the metformin 1000 mg dose x 3 months). Repeat lipid 4/2019.      Return in about 1 month for weight loss.                 Chief Complaint   Patient presents with     Weight Loss        HPI  Nikki Michelle is a 68 y.o. female is here for weight loss.     Update on goal from last visit: \"Wants to increase physical activity. Wants to be on her treadmill 30 min 5 days per week.  And she does feel like she has done this for the mot part.     Are you experiencing any side effects to the medications:  No (increased metformin)   Hunger control:  good   Exercise was discussed: she is going to the gym 3-4 times a week. Also increased work remodeling her home. Also out and about waling around the mall etc   Taking supplements:   Fish oil (not taking consistently), vitamin D, multivitamin, 5 hydroxytryptophan, chromium   Discussed journaling food:  no, but she said she mentally does keep track.   Patient is pleased with the current results:  yes   The patient is following the nutrition plan:  she has been eating out a lot due to home remodeling of kitchen. She is trying to eat more cognizantly.      Barriers to losing weight:  Metabolism:   IR.  still doing erratic sleeping.    Social History " "    Tobacco Use   Smoking Status Former Smoker     Packs/day: 0.50     Years: 5.00     Pack years: 2.50     Last attempt to quit: 1975     Years since quittin.2   Smokeless Tobacco Never Used      Current Outpatient Medications on File Prior to Visit   Medication Sig Dispense Refill     acetaminophen (TYLENOL) 325 MG tablet Take 650 mg by mouth every 6 (six) hours as needed for pain.       aspirin 81 MG EC tablet Take 81 mg by mouth daily.       b complex vitamins tablet Take 1 tablet by mouth daily.       cholecalciferol, vitamin D3, 5,000 unit Tab Take 5,000 Units by mouth.       docoshexanoic acid-eicosapent 500 mg (FISH OIL) 500-100 mg cap capsule Take 500 mg by mouth daily.       EPINEPHrine (EPIPEN) 0.3 mg/0.3 mL (1:1,000) atIn Inject into the shoulder, thigh, or buttocks once. PRN for allergic reaction       fexofenadine (ALLEGRA) 180 MG tablet Take 180 mg by mouth daily.       levothyroxine (SYNTHROID, LEVOTHROID) 100 MCG tablet Take 1 tablet by mouth every morning before breakfast.       metFORMIN (GLUCOPHAGE XR) 500 MG 24 hr tablet Take 2 tablets (1,000 mg total) by mouth daily with breakfast. 180 tablet 0     naproxen (NAPROSYN) 500 MG tablet Take 1 tablet by mouth daily. As needed       omeprazole (PRILOSEC) 20 MG capsule TK 1 C PO QD AC  3     triamcinolone (KENALOG) 0.1 % ointment Apply 1 application topically 2 (two) times a day as needed.        vitamin E 400 UNIT capsule Take 400 Units by mouth daily.       tamoxifen (NOLVADEX) 20 MG tablet Take 1 tablet (20 mg total) by mouth daily. 90 tablet 3     No current facility-administered medications on file prior to visit.       Allergies   Allergen Reactions     Formaldehyde Analogues Rash     Penicillins Hives and Swelling     Throat swelling, has tolerated Keflex in past      Aleve [Naproxen Sodium] Rash     \"Liquid capsules only\"     Codeine Nausea And Vomiting     Preservative Rash     Tramadol Nausea And Vomiting       Review of Systems " "  Constitutional: Negative.    HENT: Negative.    Eyes: Negative.    Respiratory: Negative.    Cardiovascular: Negative.    Gastrointestinal: Negative.    Endocrine: Negative.    Genitourinary: Negative.    Musculoskeletal: Negative.    Skin: Negative.    Neurological: Negative.    Hematological: Negative.    Psychiatric/Behavioral: Negative.         OBJECTIVE: /80   Pulse 72   Resp 16   Ht 5' 2.5\" (1.588 m)   Wt (!) 242 lb (109.8 kg)   BMI 43.56 kg/m     Physical Exam   Constitutional: She is oriented to person, place, and time. She appears well-developed and well-nourished. No distress.   HENT:   Head: Normocephalic and atraumatic.   Eyes: Conjunctivae are normal.   Neck: Neck supple.   Cardiovascular: Normal rate and regular rhythm.   Pulmonary/Chest: Effort normal.   Musculoskeletal: Normal range of motion.   Neurological: She is alert and oriented to person, place, and time.   Skin: Skin is warm and dry.   Psychiatric: She has a normal mood and affect.        This note was created using Dragon dictation.  Please excuse any grammatical errors.     "

## 2021-06-23 NOTE — TELEPHONE ENCOUNTER
RN cannot approve Refill Request    RN can NOT refill this medication overdue for office visits and/or labs.    Fernandez Oliver, [facility] Triage/Med Refill 1/24/2019    Requested Prescriptions   Pending Prescriptions Disp Refills     metFORMIN (GLUCOPHAGE-XR) 500 MG 24 hr tablet [Pharmacy Med Name: METFORMIN ER 500MG 24HR TABS] 180 tablet 0     Sig: TAKE 2 TABLETS(1000 MG) BY MOUTH DAILY WITH BREAKFAST    Metformin Refill Protocol Failed - 1/24/2019 11:59 AM       Failed - Microalbumin in last year     No results found for: MICROALBUR              Passed - Blood pressure in last 12 months    BP Readings from Last 1 Encounters:   01/23/19 128/84            Passed - LFT or AST or ALT in last 12 months    Albumin   Date Value Ref Range Status   03/21/2018 3.2 (L) 3.5 - 5.0 g/dL Final     Bilirubin, Total   Date Value Ref Range Status   03/21/2018 0.3 0.0 - 1.0 mg/dL Final     Alkaline Phosphatase   Date Value Ref Range Status   03/21/2018 78 45 - 120 U/L Final     AST   Date Value Ref Range Status   03/21/2018 22 0 - 40 U/L Final     ALT   Date Value Ref Range Status   03/21/2018 21 0 - 45 U/L Final     Protein, Total   Date Value Ref Range Status   03/21/2018 6.5 6.0 - 8.0 g/dL Final               Passed - GFR or Serum Creatinine in last 6 months    GFR MDRD Non Af Amer   Date Value Ref Range Status   01/23/2019 >60 >60 mL/min/1.73m2 Final     GFR MDRD Af Amer   Date Value Ref Range Status   01/23/2019 >60 >60 mL/min/1.73m2 Final            Passed - Visit with PCP or prescribing provider visit in last 6 months or next 3 months    Last office visit with prescriber/PCP: 1/23/2019 OR same dept: 1/23/2019 Kimmy Lopez MD OR same specialty: 1/23/2019 Kimmy Lopez MD Last physical: Visit date not found Last MTM visit: Visit date not found         Next appt within 3 mo: Visit date not found  Next physical within 3 mo: Visit date not found  Prescriber OR PCP: Kimmy Lopez MD  Last diagnosis  associated with med order: 1. Prediabetes  - metFORMIN (GLUCOPHAGE-XR) 500 MG 24 hr tablet [Pharmacy Med Name: METFORMIN ER 500MG 24HR TABS]; TAKE 2 TABLETS(1000 MG) BY MOUTH DAILY WITH BREAKFAST  Dispense: 180 tablet; Refill: 0     If protocol passes may refill for 12 months if within 3 months of last provider visit (or a total of 15 months).          Passed - A1C in last 6 months    Hemoglobin A1c   Date Value Ref Range Status   01/23/2019 6.2 (H) 3.5 - 6.0 % Final

## 2021-06-23 NOTE — PROGRESS NOTES
ASSESSMENT AND PLAN:     Problem List Items Addressed This Visit        High    Class 3 drug-induced obesity with serious comorbidity and body mass index (BMI) of 40.0 to 44.9 in adult (H)     Dx:BMI 47.7 likely caused by chemotherapeutic agents used for breast cancer treatment. with multiple weight related comorbid conditions including breast cancer, prediabetes (worsening despite weight loss as of 11/1/2018), osteoarthritis, sleep apnea, and binge eating disorder.        Initial Weight: 265 lbs - bmi 47.7 4/26/2018  Weight: 256 lb (116.1 kg) bmi 46.08, 5/29/2018   Weight: 254 lb (115.2 kg) Body mass index is 45.72 kg/(m^2).  6/26/2018   Weight: 248 lb (112.5 kg) bmi 44.64, 7/31/2018   Weight: 248 lb (112.5 kg) bmi 44.64, 9/4/2018   Weight: 246 lb (111.6 kg) bmi 44.28, 11/1/2018   Weight: (!) 242 lb (109.8 kg) bmi 43.56, 12/5/2018   Weight: (!) 235 lb (106.6 kg)  Weight loss from initial: 30  % Weight loss: 11.32 %       Medication notes:   4/30/18 - intake.  4/26/18 IR evidenced by a1c 5.9, fast blood sugar 126 and waist circumfrence 50 in.     METFORMIN 500 MG PO Q DAY - prediabetes.  4/26/2018 - 11/1/2018    - LOST 17 LBS  -  a1c worsening despite weight loss. 4/2018 a1c was 5.9 and weight was 265 lbs, 9/2018 a1c was 6.3 and weight was 248 lbs.   11/1/2018 - metformin increased   METFORMIN XR 1000 MG PO Q DAY - prediabetes (worsening on metformin 500 xr)  11/1/2018 - 1/23/2019  Risks benefits discussed and need to monitor gfr in the setting of gfr in 50s was discussed and informed consent was given.  Alternative of using saxenda also discussed.      SAXENDA - recommended for weight loss and increasing a1c despite weight loss  11/1/2018 - discussed. (would need to discontinue metformin).     Vyvanse?   Could consider since intake indicates she binge eats.     NAC - consider if needed for compulsive eating     CONTRAVE   I also thought about Contrave for her.  She was pretty emotional during her intake visit and  tearful at times.  If it seems she has some depression ongoing during future visits she may benefit from Wellbutrin or Contrave.         PLAN  ACTIVE WEIGHT LOSS PHASE  (BMI down from 47-43)     CAP plan reset today with lower weight.  Less than or = to 237 - control  238-240 - action   Greater than or equal to 241 - panic - needs immediate follow up     Continue Metformin      Continue supplements.Fish oil (not taking consistently), vitamin D, multivitamin, 5 hydroxytryptophan, chromium     Goal: Wants to get on her treadmill 2 times a week.      Labs;   Repeat lipids weight is down 30 lbs since these were last done.   Repeat a1c weight is down 13 lbs since this was last done.   Creatinine and b12 to monitor long term effects of metformin.     FUTURE ORDERS: DEXA 9/2019.      Return in about 1 month for weight loss.          Prediabetes    Relevant Orders    Glycosylated Hemoglobin A1c       Unprioritized    Mixed hyperlipidemia    Relevant Orders    Lipid Cascade      Other Visit Diagnoses     Encounter for therapeutic drug monitoring    -  Primary    Relevant Orders    Vitamin B12    Creatinine           Chief Complaint   Patient presents with     Weight Loss        HPI  Nikki Michelle is a 68 y.o. female comes in for weight loss follow up.    Goal this past month: continue to be on her treadmill 30 min 5 days per week  Update on above goal: she was sick with what seemed to be influenza so this was difficult.     Are you experiencing any side effects to the medications:  NONE  Hunger control:    Good and seems to be getting better.   Exercise was discussed:  she did do a lot of wood work finishing and functional fitness.   Taking supplements:    She is not taking supplements because of feeling sick. She plans to get back to taking fish oil, vit d, and chromium.   Discussed journaling food:  She does not journal. She is wanting to do this.   Patient is pleased with the current results:  Yes.   The patient is  following the nutrition plan:  yes  Barriers to losing weight:  Metabolism:   IR   She has decreased her eating out which helped a lot.     Social History     Tobacco Use   Smoking Status Former Smoker     Packs/day: 0.50     Years: 5.00     Pack years: 2.50     Last attempt to quit: 1975     Years since quittin.3   Smokeless Tobacco Never Used      Current Outpatient Medications on File Prior to Visit   Medication Sig Dispense Refill     acetaminophen (TYLENOL) 325 MG tablet Take 650 mg by mouth every 6 (six) hours as needed for pain.       aspirin 81 MG EC tablet Take 81 mg by mouth daily.       b complex vitamins tablet Take 1 tablet by mouth daily.       cholecalciferol, vitamin D3, 5,000 unit Tab Take 5,000 Units by mouth.       docoshexanoic acid-eicosapent 500 mg (FISH OIL) 500-100 mg cap capsule Take 500 mg by mouth daily.       EPINEPHrine (EPIPEN) 0.3 mg/0.3 mL (1:1,000) atIn Inject into the shoulder, thigh, or buttocks once. PRN for allergic reaction       fexofenadine (ALLEGRA) 180 MG tablet Take 180 mg by mouth daily.       levothyroxine (SYNTHROID, LEVOTHROID) 100 MCG tablet Take 1 tablet by mouth every morning before breakfast.       metFORMIN (GLUCOPHAGE XR) 500 MG 24 hr tablet Take 2 tablets (1,000 mg total) by mouth daily with breakfast. 180 tablet 0     naproxen (NAPROSYN) 500 MG tablet Take 1 tablet by mouth daily. As needed       omeprazole (PRILOSEC) 20 MG capsule TK 1 C PO QD AC  3     tamoxifen (NOLVADEX) 20 MG tablet TAKE 1 TABLET(20 MG) BY MOUTH DAILY 90 tablet 0     triamcinolone (KENALOG) 0.1 % ointment Apply 1 application topically 2 (two) times a day as needed.        vitamin E 400 UNIT capsule Take 400 Units by mouth daily.       No current facility-administered medications on file prior to visit.       Allergies   Allergen Reactions     Formaldehyde Analogues Rash     Penicillins Hives and Swelling     Throat swelling, has tolerated Keflex in past      Aleve [Naproxen  "Sodium] Rash     \"Liquid capsules only\"     Codeine Nausea And Vomiting     Preservative Rash     Tramadol Nausea And Vomiting         Review of Systems   Constitutional: Negative.    HENT: Negative.    Eyes: Negative.    Respiratory: Negative.    Cardiovascular: Negative.    Gastrointestinal: Negative.    Endocrine: Negative.    Genitourinary: Negative.    Musculoskeletal: Negative.    Skin: Negative.    Neurological: Negative.    Hematological: Negative.    Psychiatric/Behavioral: Negative.         OBJECTIVE: /84   Pulse 76   Resp 16   Ht 5' 2.5\" (1.588 m)   Wt (!) 235 lb (106.6 kg)   BMI 42.30 kg/m     Physical Exam   Constitutional: She is oriented to person, place, and time. She appears well-developed and well-nourished. No distress.   HENT:   Head: Normocephalic and atraumatic.   Eyes: Conjunctivae are normal.   Neck: Neck supple.   Cardiovascular: Normal rate and regular rhythm.   Pulmonary/Chest: Effort normal.   Musculoskeletal: Normal range of motion.   Neurological: She is alert and oriented to person, place, and time.   Skin: Skin is warm and dry.   Psychiatric: She has a normal mood and affect.        Lab Results   Component Value Date    HGBA1C 6.3 (H) 09/04/2018      Lab Results   Component Value Date    CREATININE 1.02 09/04/2018      This note was created using Dragon dictation.  Please excuse any grammatical errors.    "

## 2021-06-24 NOTE — PROGRESS NOTES
ASSESSMENT AND PLAN:     Problem List Items Addressed This Visit        High    Class 3 drug-induced obesity with serious comorbidity and body mass index (BMI) of 40.0 to 44.9 in adult (H)     Dx:BMI 47.7 likely caused by chemotherapeutic agents used for breast cancer treatment. with multiple weight related comorbid conditions including breast cancer, prediabetes - starting to improve, hyperlipidemia (resolved with weight reduction), osteoarthritis, sleep apnea, and binge eating disorder.        Initial Weight: 265 lbs - bmi 47.7 4/26/2018  Weight: 256 lb (116.1 kg) bmi 46.08, 5/29/2018   Weight: 254 lb (115.2 kg) Body mass index is 45.72 kg/(m^2).  6/26/2018   Weight: 248 lb (112.5 kg) bmi 44.64, 7/31/2018   Weight: 248 lb (112.5 kg) bmi 44.64, 9/4/2018   Weight: 246 lb (111.6 kg) bmi 44.28, 11/1/2018   Weight: (!) 242 lb (109.8 kg) bmi 43.56, 12/5/2018   Weight: (!) 235 lb (106.6 kg), 42.3, 1/23/19  Weight: (!) 236 lb 4.8 oz (107.2 kg) Body mass index is 42.53 kg/m . , 2/28/2019   Weight loss from initial: 28.7  % Weight loss: 10.83 %       Medication notes:   4/30/18 - intake.  4/26/18 IR evidenced by a1c 5.9, fast blood sugar 126 and waist circumfrence 50 in.     METFORMIN 500 MG PO Q DAY - prediabetes.  4/26/2018 - 11/1/2018    - LOST 17 LBS  -  a1c worsening despite weight loss. 4/2018 a1c was 5.9 and weight was 265 lbs, 9/2018 a1c was 6.3 and weight was 248 lbs.   11/1/2018 - metformin increased   METFORMIN XR 1000 MG PO Q DAY - prediabetes (worsening on metformin 500 xr)  11/1/2018 - 2/28/2019   Risks benefits discussed and need to monitor gfr in the setting of gfr in 50s was discussed and informed consent was given.  Alternative of using saxenda also discussed.      SAXENDA - recommended for weight loss and increasing a1c despite weight loss  11/1/2018 - discussed. (would need to discontinue metformin).     Vyvanse?   Could consider since intake indicates she binge eats.     NAC - consider if needed for  compulsive eating     CONTRAVE   I also thought about Contrave for her.  She was pretty emotional during her intake visit and tearful at times.  If it seems she has some depression ongoing during future visits she may benefit from Wellbutrin or Contrave.      pmh that could impact weight loss drug choices  Age: 68 y.o.   Contraception: postmenopausal  Glaucoma history - no  Do you have a history of osteopenia or osteoporosis? - yes.   Seizure history in self or family - yes  Hx of kidney stones - yes  History of pancreatitis - yes  History of bulemia -  no-   Bingeing - 0 times per week  compulsive or emotional eating  - yes  History of thyroid medullary cancer/ other endocrine cancer or fam hx of the same - breast cancer hormone sensitive.   How many oz of caffeinated beverages do you consume per day and what type? Maybe a cup of tea or cup of coffee once or twice a week.   How much alcohol do you drink per day? NONE  Do you have depression - YES  Do you have anxiety  - NO  Are you taking any antidepressants - NO  Do you get migraines - NONE  Do you smoke - FORMER SMOKER  Do you have night eating tendencies - YES  Have you ever had diabetes/ or prediabetes or metabolic syndrome -  YES  Do you have high blood pressure - NO  Do you drink a lot of pop  - NO  Do you have insomnia - NO  Do you have any chronic pain? YES  Do you have high cholesterol - IN THE PAST         PLAN  ACTIVE WEIGHT LOSS PHASE  (BMI down from 47-42) hyperlipidemia resolved with weight reduction and a1c improving with weight reduction.      CAP plan   Less than or = to 238 - control  239-241 - action   Greater than or equal to 242 - panic - needs immediate follow up     Continue Metformin   Start wellbutrin 150 xr daily.     Continue supplements.Fish oil (not taking consistently), vitamin D, multivitamin, 5 hydroxytryptophan, chromium     Goal: start wellbutrin.      Labs;  1/2019 normalized lipids noted (w 30 lb loss), normal b12, normal  creatinine, impoved a1c from 6.3-6.2 (with 13 lb loss)  Plan repeat labs 5/2019.      FUTURE ORDERS: DEXA 9/2019.      Return in about 1 month for weight loss.          Prediabetes     Lab Results   Component Value Date    HGBA1C 6.2 (H) 01/23/2019                    Chief Complaint   Patient presents with     Weight Loss        HPI  Nikki Michelle is a 68 y.o. female comes in for weight loss follow up.    She is frustrated with her difficulty keeping her weight down and wonders if there is any other medication she can use to help.    pmh that could impact weight loss drug choices  Age: 68 y.o.   Contraception: postmenopausal  Glaucoma history - no  Do you have a history of osteopenia or osteoporosis? - yes.   Seizure history in self or family - yes  Hx of kidney stones - yes  History of pancreatitis - yes  History of bulemia -  no-   Bingeing - 0 times per week  compulsive or emotional eating  - yes  History of thyroid medullary cancer/ other endocrine cancer or fam hx of the same - breast cancer hormone sensitive.   How many oz of caffeinated beverages do you consume per day and what type? Maybe a cup of tea or cup of coffee once or twice a week.   How much alcohol do you drink per day? NONE  Do you have depression - YES  Do you have anxiety  - NO  Are you taking any antidepressants - NO  Do you get migraines - NONE  Do you smoke - FORMER SMOKER  Do you have night eating tendencies - YES  Have you ever had diabetes/ or prediabetes or metabolic syndrome -  YES  Do you have high blood pressure - NO  Do you drink a lot of pop  - NO  Do you have insomnia - NO  Do you have any chronic pain? YES  Do you have high cholesterol - IN THE PAST  Lab Results   Component Value Date    CHOL 175 01/23/2019    CHOL 214 (H) 04/26/2018     Lab Results   Component Value Date    HDL 54 01/23/2019    HDL 55 04/26/2018     Lab Results   Component Value Date    LDLCALC 94 01/23/2019    LDLCALC 132 (H) 04/26/2018     Lab Results  "  Component Value Date    TRIG 133 2019    TRIG 137 2018     No components found for: CHOLHDL       Social History     Tobacco Use   Smoking Status Former Smoker     Packs/day: 0.50     Years: 5.00     Pack years: 2.50     Last attempt to quit: 1975     Years since quittin.4   Smokeless Tobacco Never Used      Current Outpatient Medications on File Prior to Visit   Medication Sig Dispense Refill     acetaminophen (TYLENOL) 325 MG tablet Take 650 mg by mouth every 6 (six) hours as needed for pain.       aspirin 81 MG EC tablet Take 81 mg by mouth daily.       b complex vitamins tablet Take 1 tablet by mouth daily.       cholecalciferol, vitamin D3, 5,000 unit Tab Take 5,000 Units by mouth.       docoshexanoic acid-eicosapent 500 mg (FISH OIL) 500-100 mg cap capsule Take 500 mg by mouth daily.       EPINEPHrine (EPIPEN) 0.3 mg/0.3 mL (1:1,000) atIn Inject into the shoulder, thigh, or buttocks once. PRN for allergic reaction       fexofenadine (ALLEGRA) 180 MG tablet Take 180 mg by mouth daily.       levothyroxine (SYNTHROID, LEVOTHROID) 100 MCG tablet Take 1 tablet by mouth every morning before breakfast.       metFORMIN (GLUCOPHAGE-XR) 500 MG 24 hr tablet TAKE 2 TABLETS(1000 MG) BY MOUTH DAILY WITH BREAKFAST 180 tablet 0     naproxen (NAPROSYN) 500 MG tablet Take 1 tablet by mouth daily. As needed       omeprazole (PRILOSEC) 20 MG capsule TK 1 C PO QD AC  3     tamoxifen (NOLVADEX) 20 MG tablet TAKE 1 TABLET(20 MG) BY MOUTH DAILY 90 tablet 0     triamcinolone (KENALOG) 0.1 % ointment Apply 1 application topically 2 (two) times a day as needed.        vitamin E 400 UNIT capsule Take 400 Units by mouth daily.       No current facility-administered medications on file prior to visit.       Allergies   Allergen Reactions     Formaldehyde Analogues Rash     Penicillins Hives and Swelling     Throat swelling, has tolerated Keflex in past      Aleve [Naproxen Sodium] Rash     \"Liquid capsules only\"     " "Codeine Nausea And Vomiting     Preservative Rash     Tramadol Nausea And Vomiting         Review of Systems   Constitutional: Negative.    HENT: Negative.    Eyes: Negative.    Respiratory: Negative.    Cardiovascular: Negative.    Gastrointestinal: Negative.    Endocrine: Negative.    Genitourinary: Negative.    Musculoskeletal: Negative.    Skin: Negative.    Neurological: Negative.    Hematological: Negative.    Psychiatric/Behavioral: Negative.         OBJECTIVE: /78 (Patient Site: Left Arm, Patient Position: Sitting, Cuff Size: Adult Large)   Pulse 74   Ht 5' 2.5\" (1.588 m)   Wt (!) 236 lb 4.8 oz (107.2 kg)   BMI 42.53 kg/m     Physical Exam   Constitutional: She is oriented to person, place, and time. She appears well-developed and well-nourished. No distress.   HENT:   Head: Normocephalic and atraumatic.   Eyes: Conjunctivae are normal.   Neck: Neck supple.   Cardiovascular: Normal rate and regular rhythm.   Pulmonary/Chest: Effort normal.   Musculoskeletal: Normal range of motion.   Neurological: She is alert and oriented to person, place, and time.   Skin: Skin is warm and dry.   Psychiatric: She has a normal mood and affect.        Lab Results   Component Value Date    CHOL 175 01/23/2019    CHOL 214 (H) 04/26/2018     Lab Results   Component Value Date    HDL 54 01/23/2019    HDL 55 04/26/2018     Lab Results   Component Value Date    LDLCALC 94 01/23/2019    LDLCALC 132 (H) 04/26/2018     Lab Results   Component Value Date    TRIG 133 01/23/2019    TRIG 137 04/26/2018     No components found for: CHOLHDL      This note was created using Dragon dictation.  Please excuse any grammatical errors.    "

## 2021-06-24 NOTE — PATIENT INSTRUCTIONS - HE
Patient Education   Bupropion Hydrobromide Oral tablet, extended-release  What is this medicine?  BUPROPION (byoo PROE pee on) is used to treat depression.  This medicine may be used for other purposes; ask your health care provider or pharmacist if you have questions.  What should I tell my health care provider before I take this medicine?  They need to know if you have any of these conditions:    an eating disorder, such as anorexia or bulimia    bipolar disorder or psychosis    diabetes or high blood sugar, treated with medication    glaucoma    head injury or brain tumor    heart disease, previous heart attack, or irregular heart beat    high blood pressure    kidney or liver disease    seizures (convulsions)    suicidal thoughts or a previous suicide attempt    Tourette's syndrome    weight loss    an unusual or allergic reaction to bupropion, other medicines, foods, dyes, or preservatives    breast-feeding    pregnant or trying to become pregnant  How should I use this medicine?  Take this medicine by mouth with a glass of water. Follow the directions on the prescription label. You can take it with or without food. If it upsets your stomach, take it with food. Do not crush, chew, or cut these tablets. This medicine is taken once daily at the same time each day. Do not take your medicine more often than directed. Do not stop taking this medicine suddenly except upon the advice of your doctor. Stopping this medicine too quickly may cause serious side effects or your condition may worsen.  A special MedGuide will be given to you by the pharmacist with each prescription and refill. Be sure to read this information carefully each time.  Talk to your pediatrician regarding the use of this medicine in children. Special care may be needed.  Overdosage: If you think you have taken too much of this medicine contact a poison control center or emergency room at once.  NOTE: This medicine is only for you. Do not share this  medicine with others.  What if I miss a dose?  If you miss a dose, skip the missed dose and take your next tablet at the regular time. Do not take double or extra doses.  What may interact with this medicine?  Do not take this medicine with any of the following medications:    linezolid    MAOIs like Azilect, Carbex, Eldepryl, Marplan, Nardil, and Parnate    methylene blue (injected into a vein)    other medicines that contain bupropion like Zyban  This medicine may also interact with the following medications:    alcohol    certain medicines for anxiety or sleep    certain medicines for blood pressure like metoprolol, propranolol    certain medicines for depression or psychotic disturbances    certain medicines for HIV or AIDS like efavirenz, lopinavir, nelfinavir, ritonavir    certain medicines for irregular heart beat like propafenone, flecainide    certain medicines for Parkinson's disease like amantadine, levodopa    certain medicines for seizures like carbamazepine, phenytoin, phenobarbital    cimetidine    clopidogrel    cyclophosphamide    furazolidone    isoniazid    nicotine    orphenadrine    procarbazine    steroid medicines like prednisone or cortisone    stimulant medicines for attention disorders, weight loss, or to stay awake    tamoxifen    theophylline    thiotepa    ticlopidine    tramadol    warfarin  This list may not describe all possible interactions. Give your health care provider a list of all the medicines, herbs, non-prescription drugs, or dietary supplements you use. Also tell them if you smoke, drink alcohol, or use illegal drugs. Some items may interact with your medicine.  What should I watch for while using this medicine?  Tell your doctor if your symptoms do not get better or if they get worse. Visit your doctor or health care professional for regular checks on your progress. Because it may take several weeks to see the full effects of this medicine, it is important to continue your  treatment as prescribed by your doctor.  Patients and their families should watch out for new or worsening thoughts of suicide or depression. Also watch out for sudden changes in feelings such as feeling anxious, agitated, panicky, irritable, hostile, aggressive, impulsive, severely restless, overly excited and hyperactive, or not being able to sleep. If this happens, especially at the beginning of treatment or after a change in dose, call your health care professional.  Avoid alcoholic drinks while taking this medicine. Drinking large amounts of alcoholic beverages, using sleeping or anxiety medicines, or quickly stopping the use of these agents while taking this medicine may increase your risk for a seizure.  Do not drive or use heavy machinery until you know how this medicine affects you. This medicine can impair your ability to perform these tasks.  Do not take this medicine close to bedtime. It may prevent you from sleeping.  Your mouth may get dry. Chewing sugarless gum or sucking hard candy, and drinking plenty of water may help. Contact your doctor if the problem does not go away or is severe.  The tablet shell for some brands of this medicine does not dissolve. This is normal. The tablet shell may appear whole in the stool. This is not a cause for concern.  What side effects may I notice from receiving this medicine?  Side effects that you should report to your doctor or health care professional as soon as possible:    allergic reactions like skin rash, itching or hives, swelling of the face, lips, or tongue    breathing problems    changes in vision    confusion    fast or irregular heartbeat    hallucinations    increased blood pressure    redness, blistering, peeling or loosening of the skin, including inside the mouth    seizures    suicidal thoughts or other mood changes    unusually weak or tired    vomiting  Side effects that usually do not require medical attention (report to your doctor or health  care professional if they continue or are bothersome):    change in sex drive or performance    constipation    headache    loss of appetite    nausea    tremors    weight loss  This list may not describe all possible side effects. Call your doctor for medical advice about side effects. You may report side effects to FDA at 7-015-LJW-0408.  Where should I keep my medicine?  Keep out of the reach of children.  Store at room temperature between 15 and 30 degrees C (59 and 86 degrees F). Throw away any unused medicine after the expiration date.  NOTE:This sheet is a summary. It may not cover all possible information. If you have questions about this medicine, talk to your doctor, pharmacist, or health care provider. Copyright  2015 [employer] Standard           Return in about 1 month (around 3/28/2019) for weight loss.

## 2021-06-27 ENCOUNTER — HEALTH MAINTENANCE LETTER (OUTPATIENT)
Age: 71
End: 2021-06-27

## 2021-08-03 PROBLEM — C50.911 INFILTRATING DUCTAL CARCINOMA OF RIGHT BREAST (H): Status: RESOLVED | Noted: 2020-03-13 | Resolved: 2020-10-06

## 2021-10-05 ENCOUNTER — HOSPITAL ENCOUNTER (OUTPATIENT)
Dept: MAMMOGRAPHY | Facility: CLINIC | Age: 71
Discharge: HOME | End: 2021-10-05
Attending: INTERNAL MEDICINE | Admitting: INTERNAL MEDICINE
Payer: MEDICARE

## 2021-10-05 DIAGNOSIS — Z12.31 ENCOUNTER FOR SCREENING MAMMOGRAM FOR MALIGNANT NEOPLASM OF BREAST: ICD-10-CM

## 2021-10-05 DIAGNOSIS — Z17.0 MALIGNANT NEOPLASM OF UPPER-OUTER QUADRANT OF RIGHT BREAST IN FEMALE, ESTROGEN RECEPTOR POSITIVE (H): ICD-10-CM

## 2021-10-05 DIAGNOSIS — C50.411 MALIGNANT NEOPLASM OF UPPER-OUTER QUADRANT OF RIGHT BREAST IN FEMALE, ESTROGEN RECEPTOR POSITIVE (H): ICD-10-CM

## 2021-10-05 PROCEDURE — 77067 SCR MAMMO BI INCL CAD: CPT

## 2021-10-17 ENCOUNTER — HEALTH MAINTENANCE LETTER (OUTPATIENT)
Age: 71
End: 2021-10-17

## 2022-04-12 ENCOUNTER — ONCOLOGY VISIT (OUTPATIENT)
Dept: ONCOLOGY | Facility: CLINIC | Age: 72
End: 2022-04-12
Attending: INTERNAL MEDICINE
Payer: COMMERCIAL

## 2022-04-12 VITALS
BODY MASS INDEX: 47.55 KG/M2 | SYSTOLIC BLOOD PRESSURE: 156 MMHG | HEART RATE: 87 BPM | OXYGEN SATURATION: 98 % | WEIGHT: 268.4 LBS | DIASTOLIC BLOOD PRESSURE: 92 MMHG | HEIGHT: 63 IN

## 2022-04-12 DIAGNOSIS — Z12.31 ENCOUNTER FOR SCREENING MAMMOGRAM FOR MALIGNANT NEOPLASM OF BREAST: ICD-10-CM

## 2022-04-12 DIAGNOSIS — Z17.0 MALIGNANT NEOPLASM OF UPPER-OUTER QUADRANT OF RIGHT BREAST IN FEMALE, ESTROGEN RECEPTOR POSITIVE (H): Primary | ICD-10-CM

## 2022-04-12 DIAGNOSIS — C50.411 MALIGNANT NEOPLASM OF UPPER-OUTER QUADRANT OF RIGHT BREAST IN FEMALE, ESTROGEN RECEPTOR POSITIVE (H): Primary | ICD-10-CM

## 2022-04-12 PROCEDURE — 99213 OFFICE O/P EST LOW 20 MIN: CPT | Performed by: INTERNAL MEDICINE

## 2022-04-12 PROCEDURE — G0463 HOSPITAL OUTPT CLINIC VISIT: HCPCS

## 2022-04-12 ASSESSMENT — ENCOUNTER SYMPTOMS
RESPIRATORY NEGATIVE: 1
NEUROLOGICAL NEGATIVE: 1
CARDIOVASCULAR NEGATIVE: 1
ENDOCRINE NEGATIVE: 1
GASTROINTESTINAL NEGATIVE: 1
EYES NEGATIVE: 1
HEMATOLOGIC/LYMPHATIC NEGATIVE: 1
PSYCHIATRIC NEGATIVE: 1
ARTHRALGIAS: 1
FATIGUE: 1

## 2022-04-12 ASSESSMENT — PAIN SCALES - GENERAL: PAINLEVEL: NO PAIN (0)

## 2022-04-12 NOTE — PROGRESS NOTES
Assessment & Plan   Personal history of right sided ER positive breast cancer  Osteoporosis  Right hip pain  Morbid obesity  Recent antibiotics for sinus infection    Repeat screening mammogram in the fall.  Follow up here 1 year  She is planning to see ortho regarding hip  She has intermittently attempted weight loss, unenthusiastic about bariatric surgery.  This was discussed at length, patient encouraged to measure her progress against herself, not trying to match anyone else.    Interval History  She reports that she had a day a few weeks ago when her right breast was sore (didn't feel any mass) but it cleared up as quickly as it started.  Apart from that, her main problems are related to weight management.  She has been enrolled in a weight program in the past.    Oncologic History  Breast cancer of upper-outer quadrant of right female breast    Primary site: Breast (Right)    Staging method: AJCC 7th Edition    Pathologic: Stage IB (T1b, N1mi, cM0) signed by Laila Young MD on 11/13/2015 12:54 PM    Summary: Stage IB (T1b, N1mi, cM0)    Prognostic indicators:             ESTROGEN RECEPTOR: POSITIVE (95% OF CELLS) PROGESTERONE RECEPTOR: POSITIVE       (80% OF CELLS) HER2/ESTUARDO: NEGATIVE (STAINING PATTERN 0 OUT OF 3)     The carcinoma was 9 mm in size and there were micrometastases in the sentinel lymph node.  It was ER/RI positive and HER-2 negative.  Oncotype DX score was 10.     It was decided not to go for adjuvant chemotherapy.  She completed adjuvant radiation on 1/22/16 and started adjuvant Arimidex on 1/23/16.  She has osteoporosis.  She had episodes of iritis with Reclast infusions.  So she decided to stop Reclast.  She changed from Arimidex to tamoxifen on 12/13/17 on account of the osteoporosis.    Past Medical History:   Diagnosis Date     Allergic rhinitis      Breast cancer (H) 2015    right breast     Breast cyst     left breast for many years     Candidal intertrigo 4/26/2018     Chronic sinus  infection     has not had any recent recurrences.     Eczema      Environmental allergies     dust mites.     GERD (gastroesophageal reflux disease)      H/O acute pancreatitis 2005     Hx of radiation therapy 2015    right breast lumpectomy     Hypothyroidism      Infectious mononucleosis 1971     Migraine     premenstrual migraines. Not any more.     Nephrolithiasis      Obstructive sleep apnea     uses CPAP     Osteoarthritis      Patient Active Problem List   Diagnosis     Malignant neoplasm of upper-outer quadrant of right breast in female, estrogen receptor positive (H)     Osteoporosis     Class 3 drug-induced obesity with serious comorbidity and body mass index (BMI) of 40.0 to 44.9 in adult (H)     Binge eating disorder     Prediabetes     Other sleep apnea     Osteoarthritis     History of kidney stones     Use of tamoxifen (Nolvadex)     Mixed hyperlipidemia     Allergic rhinitis     Calculus of gallbladder     Esophageal reflux     Headache     Hypothyroidism     Nephrolithiasis     Psoriasis     Sinusitis, chronic     Current Outpatient Medications   Medication     acetaminophen (TYLENOL) 325 MG tablet     b complex vitamins tablet     calcium-magnesium 300-300 mg Tab     cholecalciferol, vitamin D3, 5,000 unit Tab     fexofenadine (ALLEGRA) 180 MG tablet     levothyroxine (SYNTHROID, LEVOTHROID) 100 MCG tablet     MEDICATION CANNOT BE REORDERED - PLEASE MANUALLY REORDER AND DISCONTINUE THE OLD ORDER     naproxen sodium (ALEVE ORAL)     omeprazole (PRILOSEC) 20 MG capsule     triamcinolone (KENALOG) 0.1 % ointment     vitamin E 400 UNIT capsule     EPINEPHrine (EPIPEN) 0.3 mg/0.3 mL (1:1,000) atIn     No current facility-administered medications for this visit.     Review of Systems   Constitutional: Positive for fatigue (doesn't have as much energy as she'd like, ? related to weight).   HENT:  Negative.    Eyes: Negative.    Respiratory: Negative.    Cardiovascular: Negative.    Gastrointestinal:  "Negative.    Endocrine: Negative.    Genitourinary: Negative.     Musculoskeletal: Positive for arthralgias (her right hip has been bothering her more lately).   Skin: Negative.    Neurological: Negative.    Hematological: Negative.    Psychiatric/Behavioral: Negative.    All other systems reviewed and are negative.      BP (!) 156/92   Pulse 87   Ht 1.588 m (5' 2.5\")   Wt 121.7 kg (268 lb 6.4 oz)   SpO2 98%   BMI 48.31 kg/m      Physical Exam  Vitals and nursing note reviewed.   Constitutional:       Appearance: Normal appearance. She is morbidly obese.      Comments: Well groomed, smiling     HENT:      Head: Normocephalic and atraumatic.      Nose: Nose normal.   Eyes:      Extraocular Movements: Extraocular movements intact.      Conjunctiva/sclera: Conjunctivae normal.      Pupils: Pupils are equal, round, and reactive to light.   Cardiovascular:      Rate and Rhythm: Normal rate and regular rhythm.      Heart sounds: Normal heart sounds.   Pulmonary:      Effort: Pulmonary effort is normal.      Breath sounds: Normal breath sounds.   Abdominal:      Palpations: Abdomen is soft. There is no mass.      Tenderness: There is no abdominal tenderness. There is no guarding.   Musculoskeletal:         General: No swelling or deformity.      Cervical back: Normal range of motion and neck supple.      Right lower leg: No edema.      Left lower leg: No edema.   Lymphadenopathy:      Cervical: No cervical adenopathy.   Skin:     General: Skin is warm and dry.   Neurological:      General: No focal deficit present.      Mental Status: She is alert and oriented to person, place, and time.      Cranial Nerves: No cranial nerve deficit.      Gait: Gait normal.      Deep Tendon Reflexes: Reflexes normal.   Psychiatric:         Mood and Affect: Mood normal.         Behavior: Behavior normal. Behavior is cooperative.         Thought Content: Thought content normal.         Judgment: Judgment normal.     No results found for " this or any previous visit (from the past 720 hour(s)).    No results found for this or any previous visit (from the past 744 hour(s)).

## 2022-04-12 NOTE — LETTER
"    4/12/2022         RE: Nikki Michelle  [address]  [facility] [address]        Dear Colleague,    Thank you for referring your patient, Nikki Michelle, to the [facility]. Please see a copy of my visit note below.    Oncology Rooming Note    April 12, 2022 9:43 AM   Nikki Michelle is a 71 year old female who presents for:    Chief Complaint   Patient presents with     Oncology Clinic Visit     Initial Vitals: Pulse 87   Ht 1.588 m (5' 2.5\")   Wt 121.7 kg (268 lb 6.4 oz)   SpO2 98%   BMI 48.31 kg/m   Estimated body mass index is 48.31 kg/m  as calculated from the following:    Height as of this encounter: 1.588 m (5' 2.5\").    Weight as of this encounter: 121.7 kg (268 lb 6.4 oz). Body surface area is 2.32 meters squared.  No Pain (0) Comment: Data Unavailable   No LMP recorded.  Allergies reviewed: Yes  Medications reviewed: Yes    Medications: Medication refills not needed today.  Pharmacy name entered into [employer]:    [facility] [address] [facility] [address] [id number] - [place], [place] - [address] [place] [address] AT [facility] & [address]    Clinical concerns: 1 year follow up      Connie Casillas MA              Assessment & Plan   Personal history of right sided ER positive breast cancer  Osteoporosis  Right hip pain  Morbid obesity    Repeat screening mammogram in the fall.  Follow up here 1 year  She is planning to see ortho regarding hip  She has intermittently attempted weight loss, unenthusiastic about bariatric surgery.  This was discussed at length, patient encouraged to measure her progress against herself, not trying to match anyone else.    Interval History  She reports that she had a day a few weeks ago when her right breast was sore (didn't feel any mass) but it cleared up as quickly as it started.  Apart from that, her main problems are related to weight management.  She has been enrolled in a weight program in the " past.    Oncologic History  Breast cancer of upper-outer quadrant of right female breast    Primary site: Breast (Right)    Staging method: AJCC 7th Edition    Pathologic: Stage IB (T1b, N1mi, cM0) signed by Laila Young MD on 11/13/2015 12:54 PM    Summary: Stage IB (T1b, N1mi, cM0)    Prognostic indicators:             ESTROGEN RECEPTOR: POSITIVE (95% OF CELLS) PROGESTERONE RECEPTOR: POSITIVE       (80% OF CELLS) HER2/ESTUARDO: NEGATIVE (STAINING PATTERN 0 OUT OF 3)     The carcinoma was 9 mm in size and there were micrometastases in the sentinel lymph node.  It was ER/VT positive and HER-2 negative.  Oncotype DX score was 10.     It was decided not to go for adjuvant chemotherapy.  She completed adjuvant radiation on 1/22/16 and started adjuvant Arimidex on 1/23/16.  She has osteoporosis.  She had episodes of iritis with Reclast infusions.  So she decided to stop Reclast.  She changed from Arimidex to tamoxifen on 12/13/17 on account of the osteoporosis.    Past Medical History:   Diagnosis Date     Allergic rhinitis      Breast cancer (H) 2015    right breast     Breast cyst     left breast for many years     Candidal intertrigo 4/26/2018     Chronic sinus infection     has not had any recent recurrences.     Eczema      Environmental allergies     dust mites.     GERD (gastroesophageal reflux disease)      H/O acute pancreatitis 2005     Hx of radiation therapy 2015    right breast lumpectomy     Hypothyroidism      Infectious mononucleosis 1971     Migraine     premenstrual migraines. Not any more.     Nephrolithiasis      Obstructive sleep apnea     uses CPAP     Osteoarthritis      Patient Active Problem List   Diagnosis     Malignant neoplasm of upper-outer quadrant of right breast in female, estrogen receptor positive (H)     Osteoporosis     Class 3 drug-induced obesity with serious comorbidity and body mass index (BMI) of 40.0 to 44.9 in adult (H)     Binge eating disorder     Prediabetes     Other sleep  "apnea     Osteoarthritis     History of kidney stones     Use of tamoxifen (Nolvadex)     Mixed hyperlipidemia     Allergic rhinitis     Calculus of gallbladder     Esophageal reflux     Headache     Hypothyroidism     Nephrolithiasis     Psoriasis     Sinusitis, chronic     Current Outpatient Medications   Medication     acetaminophen (TYLENOL) 325 MG tablet     b complex vitamins tablet     calcium-magnesium 300-300 mg Tab     cholecalciferol, vitamin D3, 5,000 unit Tab     fexofenadine (ALLEGRA) 180 MG tablet     levothyroxine (SYNTHROID, LEVOTHROID) 100 MCG tablet     MEDICATION CANNOT BE REORDERED - PLEASE MANUALLY REORDER AND DISCONTINUE THE OLD ORDER     naproxen sodium (ALEVE ORAL)     omeprazole (PRILOSEC) 20 MG capsule     triamcinolone (KENALOG) 0.1 % ointment     vitamin E 400 UNIT capsule     EPINEPHrine (EPIPEN) 0.3 mg/0.3 mL (1:1,000) atIn     No current facility-administered medications for this visit.     Review of Systems   Constitutional: Positive for fatigue (doesn't have as much energy as she'd like, ? related to weight).   HENT:  Negative.    Eyes: Negative.    Respiratory: Negative.    Cardiovascular: Negative.    Gastrointestinal: Negative.    Endocrine: Negative.    Genitourinary: Negative.     Musculoskeletal: Positive for arthralgias (her right hip has been bothering her more lately).   Skin: Negative.    Neurological: Negative.    Hematological: Negative.    Psychiatric/Behavioral: Negative.    All other systems reviewed and are negative.      BP (!) 156/92   Pulse 87   Ht 1.588 m (5' 2.5\")   Wt 121.7 kg (268 lb 6.4 oz)   SpO2 98%   BMI 48.31 kg/m      Physical Exam  Vitals and nursing note reviewed.   Constitutional:       Appearance: Normal appearance. She is morbidly obese.      Comments: Well groomed, smiling     HENT:      Head: Normocephalic and atraumatic.      Nose: Nose normal.   Eyes:      Extraocular Movements: Extraocular movements intact.      Conjunctiva/sclera: " Conjunctivae normal.      Pupils: Pupils are equal, round, and reactive to light.   Cardiovascular:      Rate and Rhythm: Normal rate and regular rhythm.      Heart sounds: Normal heart sounds.   Pulmonary:      Effort: Pulmonary effort is normal.      Breath sounds: Normal breath sounds.   Abdominal:      Palpations: Abdomen is soft. There is no mass.      Tenderness: There is no abdominal tenderness. There is no guarding.   Musculoskeletal:         General: No swelling or deformity.      Cervical back: Normal range of motion and neck supple.      Right lower leg: No edema.      Left lower leg: No edema.   Lymphadenopathy:      Cervical: No cervical adenopathy.   Skin:     General: Skin is warm and dry.   Neurological:      General: No focal deficit present.      Mental Status: She is alert and oriented to person, place, and time.      Cranial Nerves: No cranial nerve deficit.      Gait: Gait normal.      Deep Tendon Reflexes: Reflexes normal.   Psychiatric:         Mood and Affect: Mood normal.         Behavior: Behavior normal. Behavior is cooperative.         Thought Content: Thought content normal.         Judgment: Judgment normal.     No results found for this or any previous visit (from the past 720 hour(s)).    No results found for this or any previous visit (from the past 744 hour(s)).          Again, thank you for allowing me to participate in the care of your patient.        Sincerely,        Janna Roman MD

## 2022-04-12 NOTE — PROGRESS NOTES
"Oncology Rooming Note    April 12, 2022 9:43 AM   Nikki Michelle is a 71 year old female who presents for:    Chief Complaint   Patient presents with     Oncology Clinic Visit     Initial Vitals: Pulse 87   Ht 1.588 m (5' 2.5\")   Wt 121.7 kg (268 lb 6.4 oz)   SpO2 98%   BMI 48.31 kg/m   Estimated body mass index is 48.31 kg/m  as calculated from the following:    Height as of this encounter: 1.588 m (5' 2.5\").    Weight as of this encounter: 121.7 kg (268 lb 6.4 oz). Body surface area is 2.32 meters squared.  No Pain (0) Comment: Data Unavailable   No LMP recorded.  Allergies reviewed: Yes  Medications reviewed: Yes    Medications: Medication refills not needed today.  Pharmacy name entered into [employer]:    [facility] [address] [place], [place] - [address] [facility] #[id number] - [place], [place] - [address] [facility] [address] AT [facility] [address]    Clinical concerns: 1 year follow up      Connie Casillas MA            "

## 2022-07-23 ENCOUNTER — HEALTH MAINTENANCE LETTER (OUTPATIENT)
Age: 72
End: 2022-07-23

## 2022-10-01 ENCOUNTER — HEALTH MAINTENANCE LETTER (OUTPATIENT)
Age: 72
End: 2022-10-01

## 2022-10-06 ENCOUNTER — HOSPITAL ENCOUNTER (OUTPATIENT)
Dept: MAMMOGRAPHY | Facility: CLINIC | Age: 72
Discharge: HOME | End: 2022-10-06
Attending: INTERNAL MEDICINE | Admitting: INTERNAL MEDICINE
Payer: COMMERCIAL

## 2022-10-06 DIAGNOSIS — Z17.0 MALIGNANT NEOPLASM OF UPPER-OUTER QUADRANT OF RIGHT BREAST IN FEMALE, ESTROGEN RECEPTOR POSITIVE (H): ICD-10-CM

## 2022-10-06 DIAGNOSIS — Z12.31 ENCOUNTER FOR SCREENING MAMMOGRAM FOR MALIGNANT NEOPLASM OF BREAST: ICD-10-CM

## 2022-10-06 DIAGNOSIS — C50.411 MALIGNANT NEOPLASM OF UPPER-OUTER QUADRANT OF RIGHT BREAST IN FEMALE, ESTROGEN RECEPTOR POSITIVE (H): ICD-10-CM

## 2022-10-06 PROCEDURE — 77067 SCR MAMMO BI INCL CAD: CPT

## 2023-08-12 ENCOUNTER — HEALTH MAINTENANCE LETTER (OUTPATIENT)
Age: 73
End: 2023-08-12

## 2023-11-14 ENCOUNTER — HOSPITAL ENCOUNTER (OUTPATIENT)
Dept: MAMMOGRAPHY | Facility: CLINIC | Age: 73
Discharge: HOME | End: 2023-11-14
Attending: INTERNAL MEDICINE | Admitting: INTERNAL MEDICINE
Payer: COMMERCIAL

## 2023-11-14 DIAGNOSIS — Z12.31 VISIT FOR SCREENING MAMMOGRAM: ICD-10-CM

## 2023-11-14 PROCEDURE — 77067 SCR MAMMO BI INCL CAD: CPT

## 2024-09-29 ENCOUNTER — HEALTH MAINTENANCE LETTER (OUTPATIENT)
Age: 74
End: 2024-09-29

## 2024-12-03 ENCOUNTER — HOSPITAL ENCOUNTER (OUTPATIENT)
Dept: MAMMOGRAPHY | Facility: CLINIC | Age: 74
Discharge: HOME | End: 2024-12-03
Attending: INTERNAL MEDICINE
Payer: COMMERCIAL

## 2024-12-03 DIAGNOSIS — Z12.31 VISIT FOR SCREENING MAMMOGRAM: ICD-10-CM

## 2024-12-03 PROCEDURE — 77063 BREAST TOMOSYNTHESIS BI: CPT

## 2024-12-16 ENCOUNTER — HOSPITAL ENCOUNTER (OUTPATIENT)
Dept: MAMMOGRAPHY | Facility: CLINIC | Age: 74
Discharge: HOME | End: 2024-12-16
Attending: FAMILY MEDICINE
Payer: COMMERCIAL

## 2024-12-16 DIAGNOSIS — R92.8 ABNORMAL MAMMOGRAM: ICD-10-CM

## 2024-12-16 PROCEDURE — 76642 ULTRASOUND BREAST LIMITED: CPT | Mod: LT

## 2024-12-16 PROCEDURE — 77065 DX MAMMO INCL CAD UNI: CPT | Mod: LT

## 2025-01-13 NOTE — ASSESSMENT & PLAN NOTE
OSTEOPOROSIS/OSTEOPENIA  She reports that her last DEXA scan that showed osteoporosis improving and is now osteopenia instead.  DEXA due again 9/2019   What Type Of Note Output Would You Prefer (Optional)?: Standard Output Hpi Title: Evaluation of Skin Lesions Additional History: Lesion of concern on right forearm

## 2025-03-24 ENCOUNTER — PATIENT OUTREACH (OUTPATIENT)
Dept: ONCOLOGY | Facility: CLINIC | Age: 75
End: 2025-03-24
Payer: COMMERCIAL

## 2025-03-24 ENCOUNTER — TRANSCRIBE ORDERS (OUTPATIENT)
Dept: OTHER | Age: 75
End: 2025-03-24

## 2025-03-24 DIAGNOSIS — R92.8 ABNORMAL MAMMOGRAM: Primary | ICD-10-CM

## 2025-03-24 NOTE — TELEPHONE ENCOUNTER
RECORDS STATUS - BREAST    RECORDS REQUESTED FROM: [facility]   NOTES DETAILS STATUS   OFFICE NOTE from referring provider SELF REFERRAL    OFFICE NOTE from medical oncologist [facility] 4/12/2022 - Dr. Janna Roman  1/20/2021 - Dr. Laila Young  10/26/2015 - Dr. Dayday Beaulieu   OFFICE NOTE from surgeon [facility] 9/13/2016 - Dr. Raeann Wall   DISCHARGE SUMMARY from hospital [facility] 9/28/2015 - [facility]    OPERATIVE REPORT [facility] 9/28/2015 - RIGHT LUMPECTOMY WIRE LOCALIZATION    MEDICATION LIST [facility]    LABS     PATHOLOGY REPORTS  (Tissue diagnosis, Stage, ER/WA percentage positive and intensity of staining, HER2 IHC, FISH, and all biopsies from breast and any distant metastasis)                 Epic 9/28/2015 - [id number]    GENONOMIC TESTING     TYPE:   (Next Generation Sequencing, including Foundation One testing, and Oncotype score) Epic 10/19/2015   IMAGING (NEED IMAGES & REPORT)     MAMMO PACS 12/16/2024-10/1/2020   ULTRASOUND PACS US Breast: 12/16/2024

## 2025-03-24 NOTE — PROGRESS NOTES
New Patient Oncology Nurse Navigator Note     Referring provider: Self     Referred to (specialty:) Medical Oncology      Date Referral Received: March 24, 2025     Evaluation for:    R92.8 (ICD-10-CM) - Abnormal mammogram  History of breast cancer     Clinical History (per Nurse review of records provided):     Nikki Michelle is a 74 year old female with history of right breast cancer. She is s/p lumpectomy and SNL biopsy, and adjuvant radiation completed on 1/22/2016. She started adjuvant anastrozole on 1/23/16. That was switched to tamoxifen on 12/13/17 and she stopped endocrine therapy on 1/20/21 at Dr. Laila Young's instruction. .     Oncotype RS = 10.     She was last seen by medical oncology in [facility] by Dr. Janna Roman. Screening imaging in fall and return to follow-up in one year was recommended.    Patient is 9 years out from diagnosis.     Referral called in by patient, Abnormal mammogram at last visit 12/2024, [facility], would like to reestablish care, previous patient of Dr. Mckeon, per referral request [facility]     Her last breast imaging was performed on 12/16/24 with diagnostic left breast mammogram and ultrasound. That was deemed benign and short interval follow-up 6 months recommended.     3/24 0119 - Telephoned and spoke with Nikki. Explained my role and purpose for the call. She prefers not to see Dr. Beaulieu. Writer received referral, reviewed for appropriate plan, and call warm transferred to New Patient Scheduling for completion.    Records Location: See Bookmarked material

## 2025-04-01 ENCOUNTER — ONCOLOGY VISIT (OUTPATIENT)
Dept: ONCOLOGY | Facility: HOSPITAL | Age: 75
End: 2025-04-01
Attending: INTERNAL MEDICINE
Payer: COMMERCIAL

## 2025-04-01 ENCOUNTER — PRE VISIT (OUTPATIENT)
Dept: ONCOLOGY | Facility: HOSPITAL | Age: 75
End: 2025-04-01
Payer: COMMERCIAL

## 2025-04-01 VITALS
WEIGHT: 276 LBS | SYSTOLIC BLOOD PRESSURE: 138 MMHG | RESPIRATION RATE: 20 BRPM | HEART RATE: 93 BPM | DIASTOLIC BLOOD PRESSURE: 92 MMHG | HEIGHT: 60 IN | OXYGEN SATURATION: 96 % | BODY MASS INDEX: 54.19 KG/M2

## 2025-04-01 DIAGNOSIS — Z17.0 MALIGNANT NEOPLASM OF UPPER-OUTER QUADRANT OF RIGHT BREAST IN FEMALE, ESTROGEN RECEPTOR POSITIVE (H): ICD-10-CM

## 2025-04-01 DIAGNOSIS — R92.8 ABNORMAL MAMMOGRAM: Primary | ICD-10-CM

## 2025-04-01 DIAGNOSIS — C50.411 MALIGNANT NEOPLASM OF UPPER-OUTER QUADRANT OF RIGHT BREAST IN FEMALE, ESTROGEN RECEPTOR POSITIVE (H): ICD-10-CM

## 2025-04-01 PROBLEM — Z79.810 USE OF TAMOXIFEN (NOLVADEX): Status: RESOLVED | Noted: 2018-06-20 | Resolved: 2025-04-01

## 2025-04-01 PROCEDURE — G2211 COMPLEX E/M VISIT ADD ON: HCPCS | Performed by: INTERNAL MEDICINE

## 2025-04-01 PROCEDURE — G0463 HOSPITAL OUTPT CLINIC VISIT: HCPCS | Performed by: INTERNAL MEDICINE

## 2025-04-01 PROCEDURE — 99205 OFFICE O/P NEW HI 60 MIN: CPT | Performed by: INTERNAL MEDICINE

## 2025-04-01 RX ORDER — ATORVASTATIN CALCIUM 10 MG/1
10 TABLET, FILM COATED ORAL DAILY
COMMUNITY
Start: 2024-12-03

## 2025-04-01 RX ORDER — HYDROCORTISONE 25 MG/G
CREAM TOPICAL
COMMUNITY
Start: 2024-11-01

## 2025-04-01 ASSESSMENT — PAIN SCALES - GENERAL: PAINLEVEL_OUTOF10: NO PAIN (0)

## 2025-04-01 NOTE — LETTER
addressed during this visit. Due to the added complexity in care, I will continue to support Nikki in the subsequent management and with ongoing continuity of care.     60 minutes spent by me on the date of the encounter doing chart review, history and exam, documentation and further activities as noted above.    Consent was obtained from the patient to use an AI documentation tool in the creation of this note.    Signed by: Trudy Marte MD       Again, thank you for allowing me to participate in the care of your patient.        Sincerely,        Trudy Marte MD    Electronically signed

## 2025-04-01 NOTE — Clinical Note
4/1/2025      Nikki Michelle  [address]  [facility] [address]      Dear Colleague,    Thank you for referring your patient, Nikki Michelle, to the [facility]. Please see a copy of my visit note below.    Oncology Rooming Note    April 1, 2025 10:46 AM   Nikki Michelle is a 74 year old female who presents for:    Chief Complaint   Patient presents with    Oncology Clinic Visit     Malignant neoplasm of upper-outer quadrant of right breast in female, estrogen receptor positive      Initial Vitals: BP (!) 138/92 (BP Location: Left arm, Patient Position: Sitting, Cuff Size: Adult Large)   Pulse 93   Resp 20   Ht 1.524 m (5')   Wt 125.2 kg (276 lb)   SpO2 96%   BMI 53.90 kg/m   Estimated body mass index is 53.9 kg/m  as calculated from the following:    Height as of this encounter: 1.524 m (5').    Weight as of this encounter: 125.2 kg (276 lb). Body surface area is 2.3 meters squared.  No Pain (0) Comment: Data Unavailable   No LMP recorded.  Allergies reviewed: Yes  Medications reviewed: Yes    Medications: Medication refills not needed today.  Pharmacy name entered into [employer]: [facility] #[id number] [address] AT [address]    Frailty Screening:   Is the patient here for a new oncology consult visit in cancer care? 1. Yes. Over the past month, have you experienced difficulty or required a caregiver to assist with:   1. Balance, walking or general mobility (including any falls)? NO  2. Completion of self-care tasks such as bathing, dressing, toileting, grooming/hygiene?  NO  3. Concentration or memory that affects your daily life?  NO     PHQ9:  Did this patient require a PHQ9?: No      Clinical concerns:   New patient consult for Malignant neoplasm of upper-outer quadrant of right breast in female, estrogen receptor positive.      Jeanie Montalvo MA              [facility] Hematology and Oncology Consult Note    Patient: Nikki  KELLY Michelle  MRN: [id number]  Date of Service: Apr 1, 2025       Reason for Visit    Chief Complaint   Patient presents with     Oncology Clinic Visit     Malignant neoplasm of upper-outer quadrant of right breast in female, estrogen receptor positive          Assessment/Plan    ECOG Performance 0 - Independent        Encounter Diagnoses:    Problem List Items Addressed This Visit          Oncology Diagnoses    Malignant neoplasm of upper-outer quadrant of right breast in female, estrogen receptor positive (H)     Other Visit Diagnoses       Abnormal mammogram    -  Primary          ______________________________________________________________________________    Staging History   Cancer Staging   Malignant neoplasm of upper-outer quadrant of right breast in female, estrogen receptor positive (H)  Staging form: Breast, AJCC 7th Edition  - Pathologic stage from 9/28/2015: Stage IA (T1b, N0(i+), cM0) - Signed by Trudy Marte MD on 3/31/2025        History    Ms. Nikki Michelle is a very pleasant 74 year old female ***        Review of systems.  Apart from describing in history, the remainder of comprehensive ROS was negative.    Past History    Past Medical History:   Diagnosis Date     Allergic rhinitis      Breast cancer (H) 2015    right breast     Breast cyst     left breast for many years     Candidal intertrigo 4/26/2018     Chronic sinus infection     has not had any recent recurrences.     Eczema      Environmental allergies     dust mites.     GERD (gastroesophageal reflux disease)      H/O acute pancreatitis 2005     Hx of radiation therapy 2015    right breast lumpectomy     Hypothyroidism      Infectious mononucleosis 1971     Migraine     premenstrual migraines. Not any more.     Nephrolithiasis      Obstructive sleep apnea     uses CPAP     Osteoarthritis      Past Surgical History:   Procedure Laterality Date     BREAST CYST ASPIRATION Left     benign many years ago     FINGER SURGERY Right     2nd  right finger d/t laceration of tendon     LASIK Bilateral 2005     IN MASTECTOMY, PARTIAL Right 2015    Procedure: RIGHT LUMPECTOMY WIRE LOCALIZATION;  Surgeon: Raeann Wall MD;  Location: [facility] OR;  Service: General     SENTINEL LYMPH NODE BIOPSY Right 2015     Family History   Problem Relation Age of Onset     Cerebrovascular Disease Mother      Heart Disease Father      Breast Cancer Daughter 48     Hypertension Daughter      Diabetes Brother      Heart Disease Brother 70     Hypertension Son      Social History     Socioeconomic History     Marital status:    Tobacco Use     Smoking status: Former     Current packs/day: 0.00     Average packs/day: 0.5 packs/day for 5.0 years (2.5 ttl pk-yrs)     Types: Cigarettes     Start date: 1970     Quit date: 1975     Years since quittin.5     Smokeless tobacco: Never   Substance and Sexual Activity     Alcohol use: No     Comment: Alcoholic Drinks/day: Quit drinking      Drug use: No   Social History Narrative    Lives in [place], [place].     Social Drivers of Health     Financial Resource Strain: Low Risk  (2023)    [facility]    Financial Resource Strain      Difficulty of Paying Living Expenses: 3   Food Insecurity: No Food Insecurity (2023)    Received from [facility]    Food Insecurity      Do you worry your food will run out before you are able to buy more?: 1   Transportation Needs: No Transportation Needs (2023)    [facility]    Transportation Needs      Does lack of transportation keep you from medical appointments?: 1      Does lack of transportation keep you from work, meetings or getting things that you need?: 1   Social Connections: Socially Integrated (2023)    Received from [facility]    Social Connections      Do you often  "feel lonely or isolated from those around you?: 0   Housing Stability: Low Risk  (12/3/2024)    Received from [facility] [facility] [employer] [facility]    Housing Stability      What is your housing situation today?: 1       Allergies    Allergies   Allergen Reactions     Formaldehyde Analogues [Formaldehyde] Rash     Penicillins Hives and Swelling     Throat swelling, has tolerated Keflex in past      Aleve [Naproxen] Rash     \"Liquid capsules only\"     Codeine Nausea and Vomiting     Preservative [External Allergen Needs Reconciliation - See Comment] Rash     Tramadol Nausea and Vomiting       Physical Exam    BP (!) 138/92 (BP Location: Left arm, Patient Position: Sitting, Cuff Size: Adult Large)   Pulse 93   Resp 20   Ht 1.524 m (5')   Wt 125.2 kg (276 lb)   SpO2 96%   BMI 53.90 kg/m      General: alert, awake, not in acute distress  HEENT: Head: Normal, normocephalic, atraumatic.  Eye: Normal external eye, conjunctiva, lids cornea, SUSANNE.  Nose: Normal external nose, mucus membranes and septum.  Pharynx: Normal buccal mucosa. Normal pharynx.  Neck / Thyroid: Supple, no masses, nodes, nodules or enlargement.  Lymphatics: No abnormally enlarged lymph nodes.  Chest: Normal chest wall and respirations. Clear to auscultation.  Breasts: ***   Heart: S1 S2 RRR, no murmur.   Abdomen: abdomen is soft without significant tenderness, masses, organomegaly or guarding  Extremities: normal strength, tone, and muscle mass  Skin: normal. no rash or abnormalities  CNS: non focal.    Lab Results    No results found for this or any previous visit (from the past week).    Imaging Results    No results found.    The longitudinal plan of care for the diagnosis(es)/condition(s) as documented were addressed during this visit. Due to the added complexity in care, I will continue to support Nikki in the subsequent management and with ongoing continuity of care.     *** minutes spent by me on the date of the encounter doing " chart review, history and exam, documentation and further activities as noted above.    Consent was obtained from the patient to use an AI documentation tool in the creation of this note.    Signed by: Trudy Marte MD         Again, thank you for allowing me to participate in the care of your patient.        Sincerely,        Trudy Marte MD    Electronically signed

## 2025-04-01 NOTE — PROGRESS NOTES
[facility] Consult Note    Patient: Nikki Michelle  MRN: [id number]  Date of Service: Apr 1, 2025       Reason for Visit    Chief Complaint   Patient presents with    Oncology Clinic Visit     Malignant neoplasm of upper-outer quadrant of right breast in female, estrogen receptor positive          Assessment/Plan    ECOG Performance 0 - Independent        Encounter Diagnoses:    Problem List Items Addressed This Visit          Oncology Diagnoses    Malignant neoplasm of upper-outer quadrant of right breast in female, estrogen receptor positive (H)    Relevant Orders    MA Diagnostic Left w/ Yoshi     Other Visit Diagnoses       Abnormal mammogram    -  Primary    Relevant Orders    MA Diagnostic Left w/ Yoshi    US Breast Left Limited 1-3 Quadrants          ______________________________________________________________________________    Staging History   Cancer Staging   Malignant neoplasm of upper-outer quadrant of right breast in female, estrogen receptor positive (H)  Staging form: Breast, AJCC 7th Edition  - Pathologic stage from 9/28/2015: Stage IA (T1b, N0(i+), cM0) - Signed by Trudy Marte MD on 3/31/2025        History    Ms. Nikki Michelle is a very pleasant 74 year old female ***        Review of systems.  Apart from describing in history, the remainder of comprehensive ROS was negative.    Past History    Past Medical History:   Diagnosis Date    Allergic rhinitis     Breast cancer (H) 2015    right breast    Breast cyst     left breast for many years    Candidal intertrigo 4/26/2018    Chronic sinus infection     has not had any recent recurrences.    Eczema     Environmental allergies     dust mites.    GERD (gastroesophageal reflux disease)     H/O acute pancreatitis 2005    Hx of radiation therapy 2015    right breast lumpectomy    Hypothyroidism     Infectious mononucleosis 1971    Migraine     premenstrual migraines. Not any more.    Nephrolithiasis      Obstructive sleep apnea     uses CPAP    Osteoarthritis      Past Surgical History:   Procedure Laterality Date    BREAST CYST ASPIRATION Left     benign many years ago    FINGER SURGERY Right     2nd right finger d/t laceration of tendon    LASIK Bilateral 2005    WI MASTECTOMY, PARTIAL Right 2015    Procedure: RIGHT LUMPECTOMY WIRE LOCALIZATION;  Surgeon: Raeann Wall MD;  Location: [facility] [place];  Service: General    SENTINEL LYMPH NODE BIOPSY Right 2015     Family History   Problem Relation Age of Onset    Cerebrovascular Disease Mother     Heart Disease Father     Breast Cancer Daughter 48    Hypertension Daughter     Diabetes Brother     Heart Disease Brother 70    Hypertension Son      Social History     Socioeconomic History    Marital status:    Tobacco Use    Smoking status: Former     Current packs/day: 0.00     Average packs/day: 0.5 packs/day for 5.0 years (2.5 ttl pk-yrs)     Types: Cigarettes     Start date: 1970     Quit date: 1975     Years since quittin.5    Smokeless tobacco: Never   Substance and Sexual Activity    Alcohol use: No     Comment: Alcoholic Drinks/day: Quit drinking     Drug use: No   Social History Narrative    Lives in [place], [place].     Social Drivers of Health     Financial Resource Strain: Low Risk  (2023)    Received from [facility]    Financial Resource Strain     Difficulty of Paying Living Expenses: 3   Food Insecurity: No Food Insecurity (2023)    Received from [facility]    Food Insecurity     Do you worry your food will run out before you are able to buy more?: 1   Transportation Needs: No Transportation Needs (2023)    Received from [facility]    Transportation Needs     Does lack of transportation keep you from medical appointments?: 1     Does lack of transportation keep you from work, meetings or getting  "things that you need?: 1   Social Connections: Socially Integrated (11/14/2023)    Received from [facility]    Social Connections     Do you often feel lonely or isolated from those around you?: 0   Housing Stability: Low Risk  (12/3/2024)    Received from [facility]    Housing Stability     What is your housing situation today?: 1       Allergies    Allergies   Allergen Reactions    Formaldehyde Analogues [Formaldehyde] Rash    Penicillins Hives and Swelling     Throat swelling, has tolerated Keflex in past     Aleve [Naproxen] Rash     \"Liquid capsules only\"    Codeine Nausea and Vomiting    Preservative [External Allergen Needs Reconciliation - See Comment] Rash    Tramadol Nausea and Vomiting       Physical Exam    BP (!) 138/92 (BP Location: Left arm, Patient Position: Sitting, Cuff Size: Adult Large)   Pulse 93   Resp 20   Ht 1.524 m (5')   Wt 125.2 kg (276 lb)   SpO2 96%   BMI 53.90 kg/m      General: alert, awake, not in acute distress  HEENT: Head: Normal, normocephalic, atraumatic.  Eye: Normal external eye, conjunctiva, lids cornea, SUSANNE.  Nose: Normal external nose, mucus membranes and septum.  Pharynx: Normal buccal mucosa. Normal pharynx.  Neck / Thyroid: Supple, no masses, nodes, nodules or enlargement.  Lymphatics: No abnormally enlarged lymph nodes.  Chest: Normal chest wall and respirations. Clear to auscultation.  Breasts: ***   Heart: S1 S2 RRR, no murmur.   Abdomen: abdomen is soft without significant tenderness, masses, organomegaly or guarding  Extremities: normal strength, tone, and muscle mass  Skin: normal. no rash or abnormalities  CNS: non focal.    Lab Results    No results found for this or any previous visit (from the past week).    Imaging Results    No results found.    The longitudinal plan of care for the diagnosis(es)/condition(s) as documented were addressed during this visit. Due to the added complexity in " care, I will continue to support Nikki in the subsequent management and with ongoing continuity of care.     *** minutes spent by me on the date of the encounter doing chart review, history and exam, documentation and further activities as noted above.    Consent was obtained from the patient to use an AI documentation tool in the creation of this note.    Signed by: Trudy Marte MD      addressed during this visit. Due to the added complexity in care, I will continue to support Nikki in the subsequent management and with ongoing continuity of care.     60 minutes spent by me on the date of the encounter doing chart review, history and exam, documentation and further activities as noted above.    Consent was obtained from the patient to use an AI documentation tool in the creation of this note.    Signed by: Trudy Marte MD

## 2025-04-01 NOTE — PROGRESS NOTES
Oncology Rooming Note    April 1, 2025 10:46 AM   Nikki Michelle is a 74 year old female who presents for:    Chief Complaint   Patient presents with    Oncology Clinic Visit     Malignant neoplasm of upper-outer quadrant of right breast in female, estrogen receptor positive      Initial Vitals: BP (!) 138/92 (BP Location: Left arm, Patient Position: Sitting, Cuff Size: Adult Large)   Pulse 93   Resp 20   Ht 1.524 m (5')   Wt 125.2 kg (276 lb)   SpO2 96%   BMI 53.90 kg/m   Estimated body mass index is 53.9 kg/m  as calculated from the following:    Height as of this encounter: 1.524 m (5').    Weight as of this encounter: 125.2 kg (276 lb). Body surface area is 2.3 meters squared.  No Pain (0) Comment: Data Unavailable   No LMP recorded.  Allergies reviewed: Yes  Medications reviewed: Yes    Medications: Medication refills not needed today.  Pharmacy name entered into [employer]: [id number] - [address] AT [address]    Frailty Screening:   Is the patient here for a new oncology consult visit in cancer care? 1. Yes. Over the past month, have you experienced difficulty or required a caregiver to assist with:   1. Balance, walking or general mobility (including any falls)? NO  2. Completion of self-care tasks such as bathing, dressing, toileting, grooming/hygiene?  NO  3. Concentration or memory that affects your daily life?  NO     PHQ9:  Did this patient require a PHQ9?: No      Clinical concerns:   New patient consult for Malignant neoplasm of upper-outer quadrant of right breast in female, estrogen receptor positive.      Jeanie Montalvo MA